# Patient Record
Sex: FEMALE | Race: WHITE | Employment: UNEMPLOYED | URBAN - METROPOLITAN AREA
[De-identification: names, ages, dates, MRNs, and addresses within clinical notes are randomized per-mention and may not be internally consistent; named-entity substitution may affect disease eponyms.]

---

## 2018-01-15 ENCOUNTER — OFFICE VISIT (OUTPATIENT)
Dept: INTERNAL MEDICINE CLINIC | Age: 33
End: 2018-01-15

## 2018-01-15 VITALS
HEART RATE: 72 BPM | BODY MASS INDEX: 48.99 KG/M2 | HEIGHT: 59 IN | SYSTOLIC BLOOD PRESSURE: 110 MMHG | WEIGHT: 243 LBS | DIASTOLIC BLOOD PRESSURE: 78 MMHG

## 2018-01-15 DIAGNOSIS — F32.A DEPRESSION, UNSPECIFIED DEPRESSION TYPE: ICD-10-CM

## 2018-01-15 DIAGNOSIS — M54.41 CHRONIC MIDLINE LOW BACK PAIN WITH BILATERAL SCIATICA: ICD-10-CM

## 2018-01-15 DIAGNOSIS — E27.8 LEFT ADRENAL MASS (HCC): ICD-10-CM

## 2018-01-15 DIAGNOSIS — M54.42 CHRONIC MIDLINE LOW BACK PAIN WITH BILATERAL SCIATICA: ICD-10-CM

## 2018-01-15 DIAGNOSIS — M54.42 CHRONIC MIDLINE LOW BACK PAIN WITH BILATERAL SCIATICA: Primary | ICD-10-CM

## 2018-01-15 DIAGNOSIS — G89.29 CHRONIC MIDLINE LOW BACK PAIN WITH BILATERAL SCIATICA: ICD-10-CM

## 2018-01-15 DIAGNOSIS — J45.40 MODERATE PERSISTENT ASTHMA WITHOUT COMPLICATION: ICD-10-CM

## 2018-01-15 DIAGNOSIS — E66.09 OBESITY DUE TO EXCESS CALORIES WITHOUT SERIOUS COMORBIDITY, UNSPECIFIED CLASSIFICATION: ICD-10-CM

## 2018-01-15 DIAGNOSIS — M54.41 CHRONIC MIDLINE LOW BACK PAIN WITH BILATERAL SCIATICA: Primary | ICD-10-CM

## 2018-01-15 DIAGNOSIS — G89.29 CHRONIC MIDLINE LOW BACK PAIN WITH BILATERAL SCIATICA: Primary | ICD-10-CM

## 2018-01-15 LAB
A/G RATIO: 1.7 (ref 1.1–2.2)
ALBUMIN SERPL-MCNC: 4.4 G/DL (ref 3.4–5)
ALP BLD-CCNC: 72 U/L (ref 40–129)
ALT SERPL-CCNC: 33 U/L (ref 10–40)
ANION GAP SERPL CALCULATED.3IONS-SCNC: 14 MMOL/L (ref 3–16)
AST SERPL-CCNC: 20 U/L (ref 15–37)
BACTERIA: ABNORMAL /HPF
BILIRUB SERPL-MCNC: 0.4 MG/DL (ref 0–1)
BILIRUBIN URINE: NEGATIVE
BLOOD, URINE: NEGATIVE
BUN BLDV-MCNC: 9 MG/DL (ref 7–20)
CALCIUM SERPL-MCNC: 9.5 MG/DL (ref 8.3–10.6)
CHLORIDE BLD-SCNC: 103 MMOL/L (ref 99–110)
CHOLESTEROL, TOTAL: 230 MG/DL (ref 0–199)
CLARITY: ABNORMAL
CO2: 22 MMOL/L (ref 21–32)
COLOR: YELLOW
CREAT SERPL-MCNC: 0.6 MG/DL (ref 0.6–1.1)
EPITHELIAL CELLS, UA: >36 /HPF (ref 0–5)
GFR AFRICAN AMERICAN: >60
GFR NON-AFRICAN AMERICAN: >60
GLOBULIN: 2.6 G/DL
GLUCOSE BLD-MCNC: 78 MG/DL (ref 70–99)
GLUCOSE URINE: NEGATIVE MG/DL
HCG QUALITATIVE: NEGATIVE
HCT VFR BLD CALC: 45.5 % (ref 36–48)
HDLC SERPL-MCNC: 46 MG/DL (ref 40–60)
HEMOGLOBIN: 15.5 G/DL (ref 12–16)
HYALINE CASTS: 4 /LPF (ref 0–8)
KETONES, URINE: NEGATIVE MG/DL
LDL CHOLESTEROL CALCULATED: 161 MG/DL
LEUKOCYTE ESTERASE, URINE: ABNORMAL
MCH RBC QN AUTO: 33.5 PG (ref 26–34)
MCHC RBC AUTO-ENTMCNC: 34.1 G/DL (ref 31–36)
MCV RBC AUTO: 98.1 FL (ref 80–100)
MICROSCOPIC EXAMINATION: YES
NITRITE, URINE: POSITIVE
PDW BLD-RTO: 13.7 % (ref 12.4–15.4)
PH UA: 6
PLATELET # BLD: 227 K/UL (ref 135–450)
PMV BLD AUTO: 10.1 FL (ref 5–10.5)
POTASSIUM SERPL-SCNC: 4.2 MMOL/L (ref 3.5–5.1)
PROTEIN UA: NEGATIVE MG/DL
RBC # BLD: 4.63 M/UL (ref 4–5.2)
RBC UA: 3 /HPF (ref 0–4)
SODIUM BLD-SCNC: 139 MMOL/L (ref 136–145)
SPECIFIC GRAVITY UA: 1.02
TOTAL PROTEIN: 7 G/DL (ref 6.4–8.2)
TRIGL SERPL-MCNC: 116 MG/DL (ref 0–150)
TSH REFLEX: 2.77 UIU/ML (ref 0.27–4.2)
URINE TYPE: ABNORMAL
UROBILINOGEN, URINE: 0.2 E.U./DL
VITAMIN D 25-HYDROXY: 17 NG/ML
VLDLC SERPL CALC-MCNC: 23 MG/DL
WBC # BLD: 9.8 K/UL (ref 4–11)
WBC UA: 7 /HPF (ref 0–5)

## 2018-01-15 PROCEDURE — G8427 DOCREV CUR MEDS BY ELIG CLIN: HCPCS | Performed by: INTERNAL MEDICINE

## 2018-01-15 PROCEDURE — 4004F PT TOBACCO SCREEN RCVD TLK: CPT | Performed by: INTERNAL MEDICINE

## 2018-01-15 PROCEDURE — 99204 OFFICE O/P NEW MOD 45 MIN: CPT | Performed by: INTERNAL MEDICINE

## 2018-01-15 PROCEDURE — G8417 CALC BMI ABV UP PARAM F/U: HCPCS | Performed by: INTERNAL MEDICINE

## 2018-01-15 PROCEDURE — G8484 FLU IMMUNIZE NO ADMIN: HCPCS | Performed by: INTERNAL MEDICINE

## 2018-01-15 PROCEDURE — 96160 PT-FOCUSED HLTH RISK ASSMT: CPT | Performed by: INTERNAL MEDICINE

## 2018-01-15 RX ORDER — FLUOXETINE HYDROCHLORIDE 20 MG/1
20 CAPSULE ORAL DAILY
Qty: 30 CAPSULE | Refills: 3 | Status: SHIPPED | OUTPATIENT
Start: 2018-01-15 | End: 2018-06-19 | Stop reason: SDUPTHER

## 2018-01-15 RX ORDER — ALBUTEROL SULFATE 90 UG/1
2 AEROSOL, METERED RESPIRATORY (INHALATION) EVERY 6 HOURS PRN
Qty: 1 INHALER | Refills: 3 | Status: SHIPPED | OUTPATIENT
Start: 2018-01-15 | End: 2018-08-03 | Stop reason: SDUPTHER

## 2018-01-15 RX ORDER — ALBUTEROL SULFATE 90 UG/1
2 AEROSOL, METERED RESPIRATORY (INHALATION) EVERY 6 HOURS PRN
Qty: 1 INHALER | Refills: 3 | Status: SHIPPED | OUTPATIENT
Start: 2018-01-15 | End: 2018-02-14 | Stop reason: CLARIF

## 2018-01-15 ASSESSMENT — ENCOUNTER SYMPTOMS
COUGH: 1
BACK PAIN: 1
EYE PAIN: 0
EYE DISCHARGE: 0
ABDOMINAL PAIN: 0
VOICE CHANGE: 0
NAUSEA: 0
RHINORRHEA: 0
VOMITING: 0
WHEEZING: 1
SHORTNESS OF BREATH: 0
COLOR CHANGE: 0

## 2018-01-15 ASSESSMENT — PATIENT HEALTH QUESTIONNAIRE - PHQ9
5. POOR APPETITE OR OVEREATING: 2
1. LITTLE INTEREST OR PLEASURE IN DOING THINGS: 3
10. IF YOU CHECKED OFF ANY PROBLEMS, HOW DIFFICULT HAVE THESE PROBLEMS MADE IT FOR YOU TO DO YOUR WORK, TAKE CARE OF THINGS AT HOME, OR GET ALONG WITH OTHER PEOPLE: 2
6. FEELING BAD ABOUT YOURSELF - OR THAT YOU ARE A FAILURE OR HAVE LET YOURSELF OR YOUR FAMILY DOWN: 3
7. TROUBLE CONCENTRATING ON THINGS, SUCH AS READING THE NEWSPAPER OR WATCHING TELEVISION: 3
2. FEELING DOWN, DEPRESSED OR HOPELESS: 3
8. MOVING OR SPEAKING SO SLOWLY THAT OTHER PEOPLE COULD HAVE NOTICED. OR THE OPPOSITE, BEING SO FIGETY OR RESTLESS THAT YOU HAVE BEEN MOVING AROUND A LOT MORE THAN USUAL: 3
SUM OF ALL RESPONSES TO PHQ QUESTIONS 1-9: 24
4. FEELING TIRED OR HAVING LITTLE ENERGY: 3
3. TROUBLE FALLING OR STAYING ASLEEP: 3
SUM OF ALL RESPONSES TO PHQ9 QUESTIONS 1 & 2: 6
9. THOUGHTS THAT YOU WOULD BE BETTER OFF DEAD, OR OF HURTING YOURSELF: 1

## 2018-01-15 NOTE — PROGRESS NOTES
(VENTOLIN HFA) 108 (90 Base) MCG/ACT inhaler; Inhale 2 puffs into the lungs every 6 hours as needed for Wheezing  Patient has been trying to quit his smoking. She will let me know once she is ready to quit. Depression, unspecified depression type  -     CBC; Future  -     COMPREHENSIVE METABOLIC PANEL; Future  -     TSH with Reflex; Future  -     LIPID PANEL; Future  -     FLUoxetine (PROZAC) 20 MG capsule; Take 1 capsule by mouth daily (she had been on this med in the past)  -     Ambulatory referral to Psychology  -     HCG, SERUM, QUALITATIVE; Future  If you feel that you want to hurt yourself, immediate help is available. Available resources include 1, the mobile crisis unit 212-1420, and the suicide prevention hotline at 0-834.368.2377. Left adrenal mass (HCC)  -     MRI Abdomen W WO Contrast; Future  -     HCG, SERUM, QUALITATIVE; Future    Obesity due to excess calories without serious comorbidity, unspecified classification  -     Internal Referral to Dietitian  The patient is asked to make an attempt to improve diet and exercise patterns to aid in medical management of this problem. Orders Placed This Encounter   Procedures    MRI Abdomen W WO Contrast     Standing Status:   Future     Standing Expiration Date:   1/15/2019     Order Specific Question:   Reason for exam:     Answer:   adrenal mass.  follow up    XR LUMBAR SPINE (MIN 4 VIEWS)     Standing Status:   Future     Standing Expiration Date:   1/15/2019     Order Specific Question:   Reason for exam:     Answer:   chronic back pain    CBC     Standing Status:   Future     Number of Occurrences:   1     Standing Expiration Date:   1/15/2019    COMPREHENSIVE METABOLIC PANEL     Standing Status:   Future     Number of Occurrences:   1     Standing Expiration Date:   1/15/2019    TSH with Reflex     Standing Status:   Future     Number of Occurrences:   1     Standing Expiration Date:   1/15/2019    LIPID PANEL     Standing Status:   Future     Number of Occurrences:   1     Standing Expiration Date:   1/15/2019     Order Specific Question:   Is Patient Fasting?/# of Hours     Answer:   non fasting    Urinalysis     Standing Status:   Future     Number of Occurrences:   1     Standing Expiration Date:   1/15/2019    VITAMIN D 25 HYDROXY     Standing Status:   Future     Number of Occurrences:   1     Standing Expiration Date:   1/15/2019    HCG, SERUM, QUALITATIVE     Standing Status:   Future     Number of Occurrences:   1     Standing Expiration Date:   1/15/2019    Internal Referral to Dietitian     Referral Priority:   Routine     Referral Type:   Consult for Advice and Opinion     Referral Reason:   Specialty Services Required     Requested Specialty:   Nutrition     Number of Visits Requested:   1    Ambulatory referral to Psychology     Referral Priority:   Routine     Referral Type:   Consult for Advice and Opinion     Referral Reason:   Specialty Services Required     Referred to Provider:   Yani Roy, PhD     Requested Specialty:   Psychology     Number of Visits Requested:   1     Current Outpatient Prescriptions   Medication Sig Dispense Refill    albuterol sulfate HFA (PROAIR HFA) 108 (90 Base) MCG/ACT inhaler Inhale 2 puffs into the lungs every 6 hours as needed for Wheezing 1 Inhaler 3    diclofenac (VOLTAREN) 50 MG EC tablet Take 1 tablet by mouth 3 times daily (with meals) 60 tablet 3    albuterol sulfate HFA (VENTOLIN HFA) 108 (90 Base) MCG/ACT inhaler Inhale 2 puffs into the lungs every 6 hours as needed for Wheezing 1 Inhaler 3    FLUoxetine (PROZAC) 20 MG capsule Take 1 capsule by mouth daily 30 capsule 3    Levonorgestrel (MIRENA IU) by Intrauterine route.  albuterol (PROVENTIL HFA) 108 (90 BASE) MCG/ACT inhaler Inhale 2 puffs into the lungs every 6 hours as needed for Wheezing. 1 Inhaler 1     No current facility-administered medications for this visit.         Return in about 4 weeks (around

## 2018-01-16 DIAGNOSIS — N39.0 URINARY TRACT INFECTION WITHOUT HEMATURIA, SITE UNSPECIFIED: ICD-10-CM

## 2018-01-16 DIAGNOSIS — E78.01 FAMILIAL HYPERCHOLESTEROLEMIA: Primary | ICD-10-CM

## 2018-01-16 DIAGNOSIS — E55.9 VITAMIN D DEFICIENCY: Primary | ICD-10-CM

## 2018-01-16 RX ORDER — ATORVASTATIN CALCIUM 20 MG/1
20 TABLET, FILM COATED ORAL DAILY
Qty: 30 TABLET | Refills: 3 | Status: SHIPPED | OUTPATIENT
Start: 2018-01-16 | End: 2018-08-03 | Stop reason: SDUPTHER

## 2018-01-16 RX ORDER — CEPHALEXIN 500 MG/1
500 CAPSULE ORAL 3 TIMES DAILY
Qty: 21 CAPSULE | Refills: 0 | Status: SHIPPED | OUTPATIENT
Start: 2018-01-16 | End: 2018-01-23

## 2018-02-14 ENCOUNTER — OFFICE VISIT (OUTPATIENT)
Dept: PSYCHOLOGY | Age: 33
End: 2018-02-14

## 2018-02-14 ENCOUNTER — OFFICE VISIT (OUTPATIENT)
Dept: INTERNAL MEDICINE CLINIC | Age: 33
End: 2018-02-14

## 2018-02-14 DIAGNOSIS — F32.A DEPRESSION, UNSPECIFIED DEPRESSION TYPE: ICD-10-CM

## 2018-02-14 DIAGNOSIS — E55.9 VITAMIN D DEFICIENCY: ICD-10-CM

## 2018-02-14 DIAGNOSIS — M54.42 CHRONIC MIDLINE LOW BACK PAIN WITH BILATERAL SCIATICA: ICD-10-CM

## 2018-02-14 DIAGNOSIS — F51.01 PRIMARY INSOMNIA: ICD-10-CM

## 2018-02-14 DIAGNOSIS — J45.40 MODERATE PERSISTENT ASTHMA WITHOUT COMPLICATION: Primary | ICD-10-CM

## 2018-02-14 DIAGNOSIS — E78.01 FAMILIAL HYPERCHOLESTEROLEMIA: ICD-10-CM

## 2018-02-14 DIAGNOSIS — G89.29 CHRONIC MIDLINE LOW BACK PAIN WITH BILATERAL SCIATICA: ICD-10-CM

## 2018-02-14 DIAGNOSIS — F33.1 MODERATE EPISODE OF RECURRENT MAJOR DEPRESSIVE DISORDER (HCC): Primary | ICD-10-CM

## 2018-02-14 DIAGNOSIS — M54.41 CHRONIC MIDLINE LOW BACK PAIN WITH BILATERAL SCIATICA: ICD-10-CM

## 2018-02-14 PROCEDURE — 4004F PT TOBACCO SCREEN RCVD TLK: CPT | Performed by: INTERNAL MEDICINE

## 2018-02-14 PROCEDURE — G8417 CALC BMI ABV UP PARAM F/U: HCPCS | Performed by: INTERNAL MEDICINE

## 2018-02-14 PROCEDURE — 90791 PSYCH DIAGNOSTIC EVALUATION: CPT | Performed by: PSYCHOLOGIST

## 2018-02-14 PROCEDURE — G8427 DOCREV CUR MEDS BY ELIG CLIN: HCPCS | Performed by: INTERNAL MEDICINE

## 2018-02-14 PROCEDURE — G8484 FLU IMMUNIZE NO ADMIN: HCPCS | Performed by: INTERNAL MEDICINE

## 2018-02-14 PROCEDURE — 99214 OFFICE O/P EST MOD 30 MIN: CPT | Performed by: INTERNAL MEDICINE

## 2018-02-14 RX ORDER — DICLOFENAC SODIUM 75 MG/1
75 TABLET, DELAYED RELEASE ORAL 2 TIMES DAILY
Qty: 60 TABLET | Refills: 1 | Status: SHIPPED | OUTPATIENT
Start: 2018-02-14 | End: 2018-06-19 | Stop reason: SDUPTHER

## 2018-02-14 RX ORDER — LANOLIN ALCOHOL/MO/W.PET/CERES
3 CREAM (GRAM) TOPICAL DAILY
Qty: 30 TABLET | Refills: 2 | Status: SHIPPED | OUTPATIENT
Start: 2018-02-14 | End: 2018-11-05 | Stop reason: SDUPTHER

## 2018-02-14 ASSESSMENT — ENCOUNTER SYMPTOMS
COUGH: 0
ABDOMINAL PAIN: 0
WHEEZING: 0
PHOTOPHOBIA: 0
NAUSEA: 0
SHORTNESS OF BREATH: 0
VOMITING: 0

## 2018-02-14 NOTE — PROGRESS NOTES
LIPID PANEL; Future  -     AST; Future  May consider increasing Lipitor pending lab work. The patient is asked to make an attempt to improve diet and exercise patterns to aid in medical management of this problem. Vitamin D deficiency  After completion of initial dose 50,000 weekly , She will need 2000 unit every day  Chronic midline low back pain with bilateral sciatica  -     XR LUMBAR SPINE (MIN 4 VIEWS)  -     diclofenac (VOLTAREN) 75 MG EC tablet; Take 1 tablet by mouth 2 times daily    Primary insomnia  -     melatonin (RA MELATONIN) 3 MG TABS tablet; Take 1 tablet by mouth daily  May consider sleep study to exclude sleep apnea in future visit.   Patient is advised to record sign symptoms suggestive of sleep apnea  Follow-up visit in 3 months

## 2018-02-15 ENCOUNTER — HOSPITAL ENCOUNTER (OUTPATIENT)
Dept: MRI IMAGING | Age: 33
Discharge: HOME | End: 2018-02-15
Attending: INTERNAL MEDICINE | Admitting: INTERNAL MEDICINE

## 2018-02-15 DIAGNOSIS — E27.8 LEFT ADRENAL MASS (HCC): ICD-10-CM

## 2018-02-15 DIAGNOSIS — E27.9 DISORDER OF ADRENAL GLAND (HCC): ICD-10-CM

## 2018-02-15 RX ORDER — 0.9 % SODIUM CHLORIDE 0.9 %
10 VIAL (ML) INJECTION
Status: COMPLETED | OUTPATIENT
Start: 2018-02-15 | End: 2018-02-15

## 2018-02-15 RX ADMIN — Medication 100 ML: at 12:54

## 2018-02-21 ENCOUNTER — TELEPHONE (OUTPATIENT)
Dept: INTERNAL MEDICINE CLINIC | Age: 33
End: 2018-02-21

## 2018-02-21 PROBLEM — F33.1 MODERATE EPISODE OF RECURRENT MAJOR DEPRESSIVE DISORDER (HCC): Status: ACTIVE | Noted: 2018-02-21

## 2018-05-01 ENCOUNTER — TELEPHONE (OUTPATIENT)
Dept: INTERNAL MEDICINE CLINIC | Age: 33
End: 2018-05-01

## 2018-05-01 DIAGNOSIS — N39.0 URINARY TRACT INFECTION WITHOUT HEMATURIA, SITE UNSPECIFIED: Primary | ICD-10-CM

## 2018-05-01 RX ORDER — CEPHALEXIN 500 MG/1
500 CAPSULE ORAL 3 TIMES DAILY
Qty: 21 CAPSULE | Refills: 0 | Status: SHIPPED | OUTPATIENT
Start: 2018-05-01 | End: 2018-08-03 | Stop reason: ALTCHOICE

## 2018-05-02 ENCOUNTER — OFFICE VISIT (OUTPATIENT)
Dept: INTERNAL MEDICINE CLINIC | Age: 33
End: 2018-05-02

## 2018-05-02 VITALS
SYSTOLIC BLOOD PRESSURE: 112 MMHG | HEIGHT: 59 IN | DIASTOLIC BLOOD PRESSURE: 78 MMHG | WEIGHT: 250 LBS | BODY MASS INDEX: 50.4 KG/M2 | HEART RATE: 72 BPM

## 2018-05-02 DIAGNOSIS — N39.0 URINARY TRACT INFECTION WITHOUT HEMATURIA, SITE UNSPECIFIED: ICD-10-CM

## 2018-05-02 DIAGNOSIS — R30.0 DYSURIA: Primary | ICD-10-CM

## 2018-05-02 LAB
BILIRUBIN, POC: ABNORMAL
BLOOD URINE, POC: ABNORMAL
CLARITY, POC: CLEAR
COLOR, POC: ABNORMAL
GLUCOSE URINE, POC: ABNORMAL
KETONES, POC: ABNORMAL
LEUKOCYTE EST, POC: ABNORMAL
NITRITE, POC: ABNORMAL
PH, POC: 7
PROTEIN, POC: ABNORMAL
SPECIFIC GRAVITY, POC: 1.02
UROBILINOGEN, POC: 0.2

## 2018-05-02 PROCEDURE — 81002 URINALYSIS NONAUTO W/O SCOPE: CPT | Performed by: INTERNAL MEDICINE

## 2018-05-02 PROCEDURE — 99213 OFFICE O/P EST LOW 20 MIN: CPT | Performed by: INTERNAL MEDICINE

## 2018-05-02 PROCEDURE — G8417 CALC BMI ABV UP PARAM F/U: HCPCS | Performed by: INTERNAL MEDICINE

## 2018-05-02 PROCEDURE — G8427 DOCREV CUR MEDS BY ELIG CLIN: HCPCS | Performed by: INTERNAL MEDICINE

## 2018-05-02 PROCEDURE — 4004F PT TOBACCO SCREEN RCVD TLK: CPT | Performed by: INTERNAL MEDICINE

## 2018-05-02 RX ORDER — CHOLECALCIFEROL (VITAMIN D3) 1250 MCG
CAPSULE ORAL WEEKLY
COMMUNITY
End: 2018-08-03 | Stop reason: SDUPTHER

## 2018-05-02 ASSESSMENT — ENCOUNTER SYMPTOMS
ABDOMINAL PAIN: 0
NAUSEA: 0
VOMITING: 0

## 2018-08-03 ENCOUNTER — OFFICE VISIT (OUTPATIENT)
Dept: INTERNAL MEDICINE CLINIC | Age: 33
End: 2018-08-03

## 2018-08-03 VITALS
WEIGHT: 242 LBS | HEART RATE: 72 BPM | DIASTOLIC BLOOD PRESSURE: 88 MMHG | SYSTOLIC BLOOD PRESSURE: 126 MMHG | HEIGHT: 59 IN | BODY MASS INDEX: 48.79 KG/M2

## 2018-08-03 DIAGNOSIS — E78.01 FAMILIAL HYPERCHOLESTEROLEMIA: ICD-10-CM

## 2018-08-03 DIAGNOSIS — J45.40 MODERATE PERSISTENT ASTHMA WITHOUT COMPLICATION: ICD-10-CM

## 2018-08-03 DIAGNOSIS — E55.9 VITAMIN D DEFICIENCY: ICD-10-CM

## 2018-08-03 DIAGNOSIS — F32.A DEPRESSION, UNSPECIFIED DEPRESSION TYPE: Primary | ICD-10-CM

## 2018-08-03 DIAGNOSIS — F17.219 CIGARETTE NICOTINE DEPENDENCE WITH NICOTINE-INDUCED DISORDER: ICD-10-CM

## 2018-08-03 LAB
ALBUMIN SERPL-MCNC: 4.3 G/DL (ref 3.4–5)
ALP BLD-CCNC: 76 U/L (ref 40–129)
ALT SERPL-CCNC: 29 U/L (ref 10–40)
AST SERPL-CCNC: 21 U/L (ref 15–37)
BILIRUB SERPL-MCNC: <0.2 MG/DL (ref 0–1)
BILIRUBIN DIRECT: <0.2 MG/DL (ref 0–0.3)
BILIRUBIN, INDIRECT: NORMAL MG/DL (ref 0–1)
CHOLESTEROL, TOTAL: 234 MG/DL (ref 0–199)
HDLC SERPL-MCNC: 36 MG/DL (ref 40–60)
LDL CHOLESTEROL CALCULATED: 157 MG/DL
TOTAL PROTEIN: 7.2 G/DL (ref 6.4–8.2)
TRIGL SERPL-MCNC: 206 MG/DL (ref 0–150)
VITAMIN D 25-HYDROXY: 30 NG/ML
VLDLC SERPL CALC-MCNC: 41 MG/DL

## 2018-08-03 PROCEDURE — 99214 OFFICE O/P EST MOD 30 MIN: CPT | Performed by: INTERNAL MEDICINE

## 2018-08-03 PROCEDURE — 4004F PT TOBACCO SCREEN RCVD TLK: CPT | Performed by: INTERNAL MEDICINE

## 2018-08-03 PROCEDURE — G8417 CALC BMI ABV UP PARAM F/U: HCPCS | Performed by: INTERNAL MEDICINE

## 2018-08-03 PROCEDURE — G8427 DOCREV CUR MEDS BY ELIG CLIN: HCPCS | Performed by: INTERNAL MEDICINE

## 2018-08-03 RX ORDER — CHOLECALCIFEROL (VITAMIN D3) 1250 MCG
50000 CAPSULE ORAL WEEKLY
Qty: 12 CAPSULE | Refills: 1 | Status: SHIPPED | OUTPATIENT
Start: 2018-08-03 | End: 2018-11-05 | Stop reason: SDUPTHER

## 2018-08-03 RX ORDER — FLUOXETINE HYDROCHLORIDE 20 MG/1
20 CAPSULE ORAL DAILY
Qty: 90 CAPSULE | Refills: 1 | Status: SHIPPED | OUTPATIENT
Start: 2018-08-03 | End: 2018-11-05 | Stop reason: SDUPTHER

## 2018-08-03 RX ORDER — ALBUTEROL SULFATE 90 UG/1
2 AEROSOL, METERED RESPIRATORY (INHALATION) EVERY 6 HOURS PRN
Qty: 3 INHALER | Refills: 1 | Status: SHIPPED | OUTPATIENT
Start: 2018-08-03 | End: 2020-01-20 | Stop reason: SDUPTHER

## 2018-08-03 RX ORDER — ATORVASTATIN CALCIUM 20 MG/1
20 TABLET, FILM COATED ORAL DAILY
Qty: 90 TABLET | Refills: 1 | Status: SHIPPED | OUTPATIENT
Start: 2018-08-03 | End: 2018-11-05 | Stop reason: SDUPTHER

## 2018-08-03 RX ORDER — NYSTATIN 100000 [USP'U]/G
POWDER TOPICAL 4 TIMES DAILY
COMMUNITY
End: 2018-11-05 | Stop reason: SDUPTHER

## 2018-08-03 ASSESSMENT — ENCOUNTER SYMPTOMS
WHEEZING: 1
COUGH: 1
NAUSEA: 0
ABDOMINAL PAIN: 0

## 2018-08-03 NOTE — PROGRESS NOTES
01/15/2018    ANIONGAP 14 01/15/2018    GLUCOSE 78 01/15/2018    BUN 9 01/15/2018    CREATININE 0.6 01/15/2018    GFRAA >60 01/15/2018    CALCIUM 9.5 01/15/2018    PROT 7.0 01/15/2018    LABALBU 4.4 01/15/2018    AGRATIO 1.7 01/15/2018    BILITOT 0.4 01/15/2018    ALKPHOS 72 01/15/2018    ALT 33 01/15/2018    AST 20 01/15/2018    GLOB 2.6 01/15/2018   MICRO/ALB:   Lab Results   Component Value Date    LABMICR YES 01/15/2018     LIPID:   Lab Results   Component Value Date    CHOL 230 01/15/2018    TRIG 116 01/15/2018    HDL 46 01/15/2018    LDLCALC 161 01/15/2018    LABVLDL 23 01/15/2018     TSH:   Lab Results   Component Value Date    TSHREFLEX 2.77 01/15/2018         ASSESSMENT/PLAN:    Ya Prince was seen today for follow-up and nicotine dependence. Diagnoses and all orders for this visit:    Depression, unspecified depression type  continue-     FLUoxetine (PROZAC) 20 MG capsule; Take 1 capsule by mouth daily    Familial hypercholesterolemia  -     atorvastatin (LIPITOR) 20 MG tablet; Take 1 tablet by mouth daily  -     LIPID PANEL; Future  -     HEPATIC FUNCTION PANEL; Future    Moderate persistent asthma without complication  -     albuterol sulfate HFA (VENTOLIN HFA) 108 (90 Base) MCG/ACT inhaler; Inhale 2 puffs into the lungs every 6 hours as needed for Wheezing  add-     beclomethasone (QVAR) 80 MCG/ACT inhaler; Inhale 1 puff into the lungs 2 times daily    Cyst of neck  -     Ramone Apple MD    Vitamin D deficiency  -     VITAMIN D 25 HYDROXY; Future    Cigarette nicotine dependence with nicotine-induced disorder  -     nicotine (NICOTROL) 10 MG inhaler; Inhale 1 puff into the lungs as needed for Smoking cessation    Other orders  -     Cholecalciferol (VITAMIN D3) 04205 units CAPS;  Take 50,000 capsules by mouth once a week            Orders Placed This Encounter   Procedures    LIPID PANEL     Standing Status:   Future     Number of Occurrences:   1     Standing Expiration Date:   8/3/2019

## 2018-08-16 ENCOUNTER — TELEPHONE (OUTPATIENT)
Dept: INTERNAL MEDICINE CLINIC | Age: 33
End: 2018-08-16

## 2018-08-16 DIAGNOSIS — M54.42 CHRONIC MIDLINE LOW BACK PAIN WITH BILATERAL SCIATICA: ICD-10-CM

## 2018-08-16 DIAGNOSIS — G89.29 CHRONIC MIDLINE LOW BACK PAIN WITH BILATERAL SCIATICA: ICD-10-CM

## 2018-08-16 DIAGNOSIS — M54.41 CHRONIC MIDLINE LOW BACK PAIN WITH BILATERAL SCIATICA: ICD-10-CM

## 2018-08-16 RX ORDER — DICLOFENAC SODIUM 75 MG/1
TABLET, DELAYED RELEASE ORAL
Qty: 60 TABLET | Refills: 3 | Status: SHIPPED | OUTPATIENT
Start: 2018-08-16 | End: 2018-11-05 | Stop reason: SDUPTHER

## 2018-08-20 ENCOUNTER — PREP FOR PROCEDURE (OUTPATIENT)
Dept: SURGERY | Age: 33
End: 2018-08-20

## 2018-08-20 ENCOUNTER — OFFICE VISIT (OUTPATIENT)
Dept: SURGERY | Age: 33
End: 2018-08-20

## 2018-08-20 VITALS
SYSTOLIC BLOOD PRESSURE: 100 MMHG | WEIGHT: 244 LBS | DIASTOLIC BLOOD PRESSURE: 70 MMHG | BODY MASS INDEX: 49.19 KG/M2 | HEIGHT: 59 IN

## 2018-08-20 DIAGNOSIS — L72.3 SEBACEOUS CYST: Primary | ICD-10-CM

## 2018-08-20 PROCEDURE — G8427 DOCREV CUR MEDS BY ELIG CLIN: HCPCS | Performed by: SURGERY

## 2018-08-20 PROCEDURE — 4004F PT TOBACCO SCREEN RCVD TLK: CPT | Performed by: SURGERY

## 2018-08-20 PROCEDURE — 99203 OFFICE O/P NEW LOW 30 MIN: CPT | Performed by: SURGERY

## 2018-08-20 PROCEDURE — G8417 CALC BMI ABV UP PARAM F/U: HCPCS | Performed by: SURGERY

## 2018-08-20 RX ORDER — SODIUM CHLORIDE 0.9 % (FLUSH) 0.9 %
10 SYRINGE (ML) INJECTION PRN
Status: CANCELLED | OUTPATIENT
Start: 2018-08-20 | End: 2019-08-20

## 2018-08-20 RX ORDER — SODIUM CHLORIDE 0.9 % (FLUSH) 0.9 %
10 SYRINGE (ML) INJECTION EVERY 12 HOURS SCHEDULED
Status: CANCELLED | OUTPATIENT
Start: 2018-08-20 | End: 2019-08-20

## 2018-08-20 ASSESSMENT — ENCOUNTER SYMPTOMS
EYE DISCHARGE: 0
EYE ITCHING: 0
ABDOMINAL DISTENTION: 0
ABDOMINAL PAIN: 0
BACK PAIN: 1
COLOR CHANGE: 0
CHEST TIGHTNESS: 0
APNEA: 0

## 2018-08-23 ENCOUNTER — HOSPITAL ENCOUNTER (OUTPATIENT)
Dept: SURGERY | Age: 33
Discharge: HOME | End: 2018-08-23

## 2018-08-23 VITALS
HEIGHT: 59 IN | SYSTOLIC BLOOD PRESSURE: 105 MMHG | RESPIRATION RATE: 14 BRPM | WEIGHT: 241.1 LBS | TEMPERATURE: 97.7 F | HEART RATE: 75 BPM | OXYGEN SATURATION: 96 % | BODY MASS INDEX: 48.6 KG/M2 | DIASTOLIC BLOOD PRESSURE: 62 MMHG

## 2018-08-23 DIAGNOSIS — L72.3 SEBACEOUS CYST: Primary | ICD-10-CM

## 2018-08-23 LAB
ANION GAP SERPL CALCULATED.3IONS-SCNC: 10 MMOL/L (ref 3–16)
BUN BLDV-MCNC: 10 MG/DL (ref 7–20)
CALCIUM SERPL-MCNC: 9.4 MG/DL (ref 8.3–10.6)
CHLORIDE BLD-SCNC: 105 MMOL/L (ref 99–110)
CO2: 22 MMOL/L (ref 21–32)
CREAT SERPL-MCNC: 0.7 MG/DL (ref 0.6–1.1)
GFR AFRICAN AMERICAN: >60
GFR NON-AFRICAN AMERICAN: >60
GLUCOSE BLD-MCNC: 105 MG/DL (ref 70–99)
HCG(URINE) PREGNANCY TEST: NEGATIVE
HCT VFR BLD CALC: 46.5 % (ref 36–48)
HEMOGLOBIN: 15.7 G/DL (ref 12–16)
MCH RBC QN AUTO: 33.6 PG (ref 26–34)
MCHC RBC AUTO-ENTMCNC: 33.8 G/DL (ref 31–36)
MCV RBC AUTO: 99.4 FL (ref 80–100)
PDW BLD-RTO: 13.5 % (ref 12.4–15.4)
PLATELET # BLD: 227 K/UL (ref 135–450)
PMV BLD AUTO: 8.8 FL (ref 5–10.5)
POTASSIUM SERPL-SCNC: 4 MMOL/L (ref 3.5–5.1)
RBC # BLD: 4.68 M/UL (ref 4–5.2)
SODIUM BLD-SCNC: 137 MMOL/L (ref 136–145)
WBC # BLD: 10.2 K/UL (ref 4–11)

## 2018-08-23 PROCEDURE — 11423 EXC H-F-NK-SP B9+MARG 2.1-3: CPT | Performed by: SURGERY

## 2018-08-23 PROCEDURE — 12032 INTMD RPR S/A/T/EXT 2.6-7.5: CPT | Performed by: SURGERY

## 2018-08-23 RX ORDER — OXYCODONE HYDROCHLORIDE 5 MG/1
5 TABLET ORAL ONCE
Status: COMPLETED | OUTPATIENT
Start: 2018-08-23 | End: 2018-08-23

## 2018-08-23 RX ORDER — MEPERIDINE HYDROCHLORIDE 25 MG/ML
12.5 INJECTION INTRAMUSCULAR; INTRAVENOUS; SUBCUTANEOUS EVERY 5 MIN PRN
Status: DISCONTINUED | OUTPATIENT
Start: 2018-08-23 | End: 2018-08-24 | Stop reason: HOSPADM

## 2018-08-23 RX ORDER — FENTANYL CITRATE 50 UG/ML
50 INJECTION, SOLUTION INTRAMUSCULAR; INTRAVENOUS EVERY 5 MIN PRN
Status: DISCONTINUED | OUTPATIENT
Start: 2018-08-23 | End: 2018-08-24 | Stop reason: HOSPADM

## 2018-08-23 RX ORDER — OXYCODONE HYDROCHLORIDE 5 MG/1
5 TABLET ORAL EVERY 4 HOURS PRN
Qty: 30 TABLET | Refills: 0 | Status: SHIPPED | OUTPATIENT
Start: 2018-08-23 | End: 2018-08-30

## 2018-08-23 RX ORDER — FENTANYL CITRATE 50 UG/ML
25 INJECTION, SOLUTION INTRAMUSCULAR; INTRAVENOUS EVERY 5 MIN PRN
Status: DISCONTINUED | OUTPATIENT
Start: 2018-08-23 | End: 2018-08-24 | Stop reason: HOSPADM

## 2018-08-23 RX ORDER — DIPHENHYDRAMINE HYDROCHLORIDE 50 MG/ML
12.5 INJECTION INTRAMUSCULAR; INTRAVENOUS
Status: ACTIVE | OUTPATIENT
Start: 2018-08-23 | End: 2018-08-23

## 2018-08-23 RX ORDER — SODIUM CHLORIDE 0.9 % (FLUSH) 0.9 %
10 SYRINGE (ML) INJECTION PRN
Status: DISCONTINUED | OUTPATIENT
Start: 2018-08-23 | End: 2018-08-24 | Stop reason: HOSPADM

## 2018-08-23 RX ORDER — CEFAZOLIN SODIUM 2 G/100ML
INJECTION, SOLUTION INTRAVENOUS
Status: COMPLETED
Start: 2018-08-23 | End: 2018-08-23

## 2018-08-23 RX ORDER — HYDROMORPHONE HCL 110MG/55ML
0.25 PATIENT CONTROLLED ANALGESIA SYRINGE INTRAVENOUS EVERY 5 MIN PRN
Status: DISCONTINUED | OUTPATIENT
Start: 2018-08-23 | End: 2018-08-24 | Stop reason: HOSPADM

## 2018-08-23 RX ORDER — PROMETHAZINE HYDROCHLORIDE 25 MG/ML
6.25 INJECTION, SOLUTION INTRAMUSCULAR; INTRAVENOUS EVERY 30 MIN PRN
Status: DISCONTINUED | OUTPATIENT
Start: 2018-08-23 | End: 2018-08-24 | Stop reason: HOSPADM

## 2018-08-23 RX ORDER — HYDROCODONE BITARTRATE AND ACETAMINOPHEN 5; 325 MG/1; MG/1
1 TABLET ORAL PRN
Status: ACTIVE | OUTPATIENT
Start: 2018-08-23 | End: 2018-08-23

## 2018-08-23 RX ORDER — HYDROCODONE BITARTRATE AND ACETAMINOPHEN 5; 325 MG/1; MG/1
2 TABLET ORAL PRN
Status: ACTIVE | OUTPATIENT
Start: 2018-08-23 | End: 2018-08-23

## 2018-08-23 RX ORDER — SODIUM CHLORIDE 9 MG/ML
INJECTION, SOLUTION INTRAVENOUS
Status: COMPLETED
Start: 2018-08-23 | End: 2018-08-23

## 2018-08-23 RX ORDER — SODIUM CHLORIDE 0.9 % (FLUSH) 0.9 %
10 SYRINGE (ML) INJECTION EVERY 12 HOURS SCHEDULED
Status: DISCONTINUED | OUTPATIENT
Start: 2018-08-23 | End: 2018-08-24 | Stop reason: HOSPADM

## 2018-08-23 RX ORDER — CEFAZOLIN SODIUM 2 G/100ML
2 INJECTION, SOLUTION INTRAVENOUS
Status: COMPLETED | OUTPATIENT
Start: 2018-08-23 | End: 2018-08-23

## 2018-08-23 RX ORDER — SODIUM CHLORIDE 9 MG/ML
INJECTION, SOLUTION INTRAVENOUS CONTINUOUS
Status: DISCONTINUED | OUTPATIENT
Start: 2018-08-23 | End: 2018-08-24 | Stop reason: HOSPADM

## 2018-08-23 RX ORDER — LIDOCAINE HYDROCHLORIDE 10 MG/ML
1 INJECTION, SOLUTION EPIDURAL; INFILTRATION; INTRACAUDAL; PERINEURAL
Status: ACTIVE | OUTPATIENT
Start: 2018-08-23 | End: 2018-08-23

## 2018-08-23 RX ORDER — HYDROMORPHONE HCL 110MG/55ML
0.5 PATIENT CONTROLLED ANALGESIA SYRINGE INTRAVENOUS EVERY 5 MIN PRN
Status: DISCONTINUED | OUTPATIENT
Start: 2018-08-23 | End: 2018-08-24 | Stop reason: HOSPADM

## 2018-08-23 RX ADMIN — FENTANYL CITRATE 50 MCG: 50 INJECTION, SOLUTION INTRAMUSCULAR; INTRAVENOUS at 11:50

## 2018-08-23 RX ADMIN — CEFAZOLIN SODIUM 2 G: 2 INJECTION, SOLUTION INTRAVENOUS at 10:51

## 2018-08-23 RX ADMIN — OXYCODONE HYDROCHLORIDE 5 MG: 5 TABLET ORAL at 13:05

## 2018-08-23 RX ADMIN — SODIUM CHLORIDE: 9 INJECTION, SOLUTION INTRAVENOUS at 09:36

## 2018-08-23 ASSESSMENT — PAIN DESCRIPTION - PAIN TYPE
TYPE: SURGICAL PAIN
TYPE: SURGICAL PAIN

## 2018-08-23 ASSESSMENT — PAIN DESCRIPTION - LOCATION
LOCATION: INCISION
LOCATION: INCISION
LOCATION: ABDOMEN

## 2018-08-23 ASSESSMENT — PAIN - FUNCTIONAL ASSESSMENT: PAIN_FUNCTIONAL_ASSESSMENT: 0-10

## 2018-08-23 ASSESSMENT — PAIN SCALES - GENERAL
PAINLEVEL_OUTOF10: 6
PAINLEVEL_OUTOF10: 7
PAINLEVEL_OUTOF10: 8

## 2018-08-23 ASSESSMENT — COPD QUESTIONNAIRES: CAT_SEVERITY: MODERATE

## 2018-08-23 NOTE — BRIEF OP NOTE
[facility]        Brief Operative Note  Pt Name: Lucy Marcos  MRN: [id number]  YOB: 1985    Date of Procedure: 8/23/2018    Pre-operative Diagnosis: Upper neck sebaceous cyst    Post-operative Diagnosis: same     Procedure: Excision of upper neck sebaceous cyst (2.5cm) and intermediate wound closure (4cm)    Surgeon: Vy He MD     Anesthesia:  MAC (sedation) with local anesthetic    Findings: Full note dictated, Dictation Job Number: [id number]    Estimated Blood Loss: less than 50  ml    Complications: none     Specimens: Upper neck soft tissue mass    Drains: none     Condition: stable     Disposition and Post-operative plan: PACU, home     Alan Frazier MD, FACS  8/23/2018  2:04 PM

## 2018-08-23 NOTE — H&P
[facility]    I have reviewed the history and physical and examined the patient and find no relevant changes. I have reviewed with the patient and/or family the risks, benefits, and alternatives to the procedure. Artur Gruber 6  Wiley Dyer MD, FACS  8/23/2018  10:32 AM

## 2018-08-23 NOTE — ANESTHESIA POST-OP
Anesthesia Post-op Note    Patient: Bari Duckworth  MRN: [id number]  YOB: 1985  Date of evaluation: 8/23/2018  Time:  11:56 AM     Procedure(s) Performed:     Last Vitals: /74   Pulse 85   Temp 97.5 °F (36.4 °C) (Temporal)   Resp 20   Ht 4' 11\" (1.499 m)   Wt 241 lb 1.6 oz (109.4 kg)   LMP  (LMP Unknown)   SpO2 98%   BMI 48.70 kg/m²     Wendi Phase I: Wendi Score: 10    Wendi Phase II:      Anesthesia Post Evaluation    Final anesthesia type: general  Patient location during evaluation: PACU  Patient participation: complete - patient participated  Level of consciousness: awake  Pain score: 2  Airway patency: patent  Nausea & Vomiting: no nausea  Complications: no  Cardiovascular status: blood pressure returned to baseline  Respiratory status: acceptable  Hydration status: euvolemic        Genia Morgan MD  11:56 AM

## 2018-08-23 NOTE — ANESTHESIA PRE-OP
Plan      MAC and TIVA     ASA 3       Induction: intravenous. MIPS: Postoperative opioids intended, Prophylactic antiemetics administered and Postoperative trial extubation. Plan discussed with CRNA.     Attending anesthesiologist reviewed and agrees with Jalen Jones MD   8/23/2018

## 2018-09-05 ENCOUNTER — OFFICE VISIT (OUTPATIENT)
Dept: SURGERY | Age: 33
End: 2018-09-05

## 2018-09-05 VITALS — SYSTOLIC BLOOD PRESSURE: 104 MMHG | DIASTOLIC BLOOD PRESSURE: 72 MMHG | BODY MASS INDEX: 49.08 KG/M2 | WEIGHT: 243 LBS

## 2018-09-05 DIAGNOSIS — Z09 SURGERY FOLLOW-UP: Primary | ICD-10-CM

## 2018-09-05 PROCEDURE — 99024 POSTOP FOLLOW-UP VISIT: CPT | Performed by: SURGERY

## 2018-09-05 RX ORDER — GABAPENTIN 300 MG/1
300 CAPSULE ORAL 3 TIMES DAILY PRN
Qty: 20 CAPSULE | Refills: 0 | Status: SHIPPED | OUTPATIENT
Start: 2018-09-05 | End: 2019-02-12

## 2018-09-05 NOTE — PROGRESS NOTES
[facility]  SUBJECTIVE:    Carole Will   1985   35 y.o. female presents for routine postoperative followup after excision of sebaceous cyst on neck on 2018. Pathology was benign. The wound has opened and the patient complains of incisional pain. She denies fevers chills or other complaints    Past Medical History:   Diagnosis Date    Asthma     Depression     HTN (hypertension)     Thyroid disease      Past Surgical History:   Procedure Laterality Date     SECTION      OTHER SURGICAL HISTORY Right 2018    EXCISION OF SEBACEOUS CYST OF BACK UPPER NECK     Social History     Social History    Marital status: Single     Spouse name: N/A    Number of children: 1    Years of education: N/A     Occupational History          [employer]     Social History Main Topics    Smoking status: Current Every Day Smoker     Packs/day: 0.25     Types: Cigarettes, E-Cigarettes    Smokeless tobacco: Never Used      Comment: vaping     Alcohol use Yes    Drug use: No    Sexual activity: Yes     Partners: Male     Birth control/ protection: IUD     Other Topics Concern    Not on file     Social History Narrative    No narrative on file      Family History   Problem Relation Age of Onset    Adopted: Yes    Family history unknown: Yes     Current Outpatient Prescriptions   Medication Sig Dispense Refill    gabapentin (NEURONTIN) 300 MG capsule Take 1 capsule by mouth 3 times daily as needed (pain) for up to 20 doses. . 20 capsule 0    diclofenac (VOLTAREN) 75 MG EC tablet TAKE 1 TABLET BY MOUTH TWICE DAILY 60 tablet 3    nystatin (MYCOSTATIN) 335248 UNIT/GM powder Apply topically 4 times daily Apply topically 4 times daily.       FLUoxetine (PROZAC) 20 MG capsule Take 1 capsule by mouth daily 90 capsule 1    atorvastatin (LIPITOR) 20 MG tablet Take 1 tablet by mouth daily 90 tablet 1    albuterol sulfate HFA (VENTOLIN Chronic Kidney Disease: less than 60 ml/min/1.73 sq.m. Kidney Failure: less than 15 ml/min/1.73 sq.m. Results valid for patients 18 years and older.  Calcium 08/23/2018 9.4  8.3 - 10.6 mg/dL Final    WBC 08/23/2018 10.2  4.0 - 11.0 K/uL Final    RBC 08/23/2018 4.68  4.00 - 5.20 M/uL Final    Hemoglobin 08/23/2018 15.7  12.0 - 16.0 g/dL Final    Hematocrit 08/23/2018 46.5  36.0 - 48.0 % Final    MCV 08/23/2018 99.4  80.0 - 100.0 fL Final    MCH 08/23/2018 33.6  26.0 - 34.0 pg Final    MCHC 08/23/2018 33.8  31.0 - 36.0 g/dL Final    RDW 08/23/2018 13.5  12.4 - 15.4 % Final    Platelets 69/82/1639 227  135 - 450 K/uL Final    MPV 08/23/2018 8.8  5.0 - 10.5 fL Final    HCG(Urine) Pregnancy Test 08/23/2018 Negative  Detects HCG level >20 MIU/mL Final    Comment: Note:  False Negative pregnancy results have been reported in  early pregnancy due to insufficient amounts of hCG and in late  first trimester pregnancies, though very rare, due to the hook  effect.   Always repeat results in question with a serum  quantitative pregnancy test. A serum hCG is positive 2-5 days  before the urine pregnancy test.     Orders Only on 08/03/2018   Component Date Value Ref Range Status    Cholesterol, Total 08/03/2018 234* 0 - 199 mg/dL Final    Triglycerides 08/03/2018 206* 0 - 150 mg/dL Final    HDL 08/03/2018 36* 40 - 60 mg/dL Final    LDL Calculated 08/03/2018 157* <100 mg/dL Final    VLDL Cholesterol Calculated 08/03/2018 41  Not Established mg/dL Final    Total Protein 08/03/2018 7.2  6.4 - 8.2 g/dL Final    Alb 08/03/2018 4.3  3.4 - 5.0 g/dL Final    Alkaline Phosphatase 08/03/2018 76  40 - 129 U/L Final    ALT 08/03/2018 29  10 - 40 U/L Final    AST 08/03/2018 21  15 - 37 U/L Final    Total Bilirubin 08/03/2018 <0.2  0.0 - 1.0 mg/dL Final    Bilirubin, Direct 08/03/2018 <0.2  0.0 - 0.3 mg/dL Final    Bilirubin, Indirect 08/03/2018 see below  0.0 - 1.0 mg/dL Final    Comment: Indirect Bilirubin cannot be calculated since Total Bilirubin  and/or Direct Bilirubin is below measurable range.  Vit D, 25-Hydroxy 08/03/2018 30.0  >=30 ng/mL Final    Comment: <=20 ng/mL. ........... Eileen Quiet Deficient  21-29 ng/mL. ......... Eileen Quiet Insufficient  >=30 ng/mL. ........ Eileen Quiet Sufficient     Orders Only on 08/02/2018   Component Date Value Ref Range Status    HPV TYPE 16 08/02/2018 Not Detected  Not Detected Final    HPV TYPE 18 08/02/2018 Not Detected  Not Detected Final    HPVOH (OTHER TYPES) 08/02/2018 DETECTED* Not Detected Final    *Includes 32,71,08,25,38,66,53,06,10,87,13,20 genotypes    HPV Comment 08/02/2018 See below   Final    Comment: **This is information only. See above for results. **    HPV other genotypes: 82,46,32,30,67,44,01,65,19,78,38,73    The Roche Fabiano HPV Test is a qualitative in-vitro test for  the detection of Human Papillomavirus that provides specific  genotyping information for HPV Types 16 and 18, while  concurrently detecting 12 other high-risk HPV types 31,33,35,  74,08,42,07,09,42,67,06,74 in a pooled result. The test  utilizes amplification of target DNA by Polymerase Chain  Reaction (PCR) and nucleic acid hybridization. .         No results found. Pathology:  FINAL DIAGNOSIS:    Posterior neck sebaceous cyst, excision:  - Benign ruptured follicular cyst.  - Negative for malignancy. BUCCA/BUCCA      Assessment:  excision of sebaceous cyst on neck on August 23, 2018. Pathology was benign  Partial wound dehiscence    Plan:  1. Recommend Tylenol and ibuprofen as needed for pain control. We'll try Neurontin as nerve pain may be contributing to her symptoms as well  2. Wound care with wet-to-dry dressings as needed daily  3. May shower and bathe normally  4. No restrictions with regard to activity  5. Follow general surgery office in 2 weeks for a wound check    Alan Dyer MD, FACS  9/5/2018  4:06 PM

## 2018-10-01 ENCOUNTER — OFFICE VISIT (OUTPATIENT)
Dept: INTERNAL MEDICINE CLINIC | Age: 33
End: 2018-10-01
Payer: COMMERCIAL

## 2018-10-01 VITALS
WEIGHT: 247 LBS | HEART RATE: 68 BPM | BODY MASS INDEX: 49.89 KG/M2 | DIASTOLIC BLOOD PRESSURE: 60 MMHG | SYSTOLIC BLOOD PRESSURE: 118 MMHG

## 2018-10-01 DIAGNOSIS — L50.9 HIVES: Primary | ICD-10-CM

## 2018-10-01 DIAGNOSIS — B37.2 CANDIDAL INTERTRIGO: ICD-10-CM

## 2018-10-01 PROCEDURE — G8484 FLU IMMUNIZE NO ADMIN: HCPCS | Performed by: INTERNAL MEDICINE

## 2018-10-01 PROCEDURE — G8417 CALC BMI ABV UP PARAM F/U: HCPCS | Performed by: INTERNAL MEDICINE

## 2018-10-01 PROCEDURE — 4004F PT TOBACCO SCREEN RCVD TLK: CPT | Performed by: INTERNAL MEDICINE

## 2018-10-01 PROCEDURE — G8427 DOCREV CUR MEDS BY ELIG CLIN: HCPCS | Performed by: INTERNAL MEDICINE

## 2018-10-01 PROCEDURE — 99213 OFFICE O/P EST LOW 20 MIN: CPT | Performed by: INTERNAL MEDICINE

## 2018-10-01 RX ORDER — METHYLPREDNISOLONE 4 MG/1
TABLET ORAL
Qty: 1 KIT | Refills: 0 | Status: SHIPPED | OUTPATIENT
Start: 2018-10-01 | End: 2018-10-07

## 2018-10-01 RX ORDER — HYDROXYZINE HYDROCHLORIDE 25 MG/1
25 TABLET, FILM COATED ORAL EVERY 4 HOURS PRN
Qty: 40 TABLET | Refills: 1 | Status: SHIPPED | OUTPATIENT
Start: 2018-10-01 | End: 2020-10-13 | Stop reason: SDUPTHER

## 2018-10-01 RX ORDER — CLOTRIMAZOLE 1 %
CREAM (GRAM) TOPICAL
Qty: 40 G | Refills: 1 | Status: SHIPPED | OUTPATIENT
Start: 2018-10-01 | End: 2018-10-08

## 2018-10-02 NOTE — PROGRESS NOTES
Chief complaint rash    Present illness    The patient is here with a month-long history of recurrent moderately severe urticaria. She advises me that her daughter has some type of court action pending and this has been a source of considerable stress to the patient. She attributes her chronic recurrent urticaria to the stress    Her symptoms are persistent and refractory to over-the-counter treatment. She has been unable to use anything like Aveeno bath secondary to her home facilities.     She has a second rash, she presents a picture which shows typical intertrigo under the breasts    Physical examination  She is in no acute distress  No palpable adenopathy  No wheezing  Cardiac exam normal  No hepatosplenomegaly  Typical urticarial rash  Intertrigo under breasts    Impression  Urticaria  Intertrigo    Plan  Given the severity of her symptoms placed on a Medrol Dosepak    Hydroxyzine may be used on an as-needed basis for the hives and also may help with anxiety    Lotrimin for the intertrigo

## 2018-11-05 ENCOUNTER — OFFICE VISIT (OUTPATIENT)
Dept: INTERNAL MEDICINE CLINIC | Age: 33
End: 2018-11-05
Payer: COMMERCIAL

## 2018-11-05 VITALS
HEIGHT: 59 IN | WEIGHT: 248 LBS | DIASTOLIC BLOOD PRESSURE: 88 MMHG | HEART RATE: 90 BPM | BODY MASS INDEX: 50 KG/M2 | SYSTOLIC BLOOD PRESSURE: 110 MMHG

## 2018-11-05 DIAGNOSIS — M54.42 CHRONIC MIDLINE LOW BACK PAIN WITH BILATERAL SCIATICA: ICD-10-CM

## 2018-11-05 DIAGNOSIS — G89.29 CHRONIC MIDLINE LOW BACK PAIN WITH BILATERAL SCIATICA: ICD-10-CM

## 2018-11-05 DIAGNOSIS — J45.40 MODERATE PERSISTENT ASTHMA WITHOUT COMPLICATION: ICD-10-CM

## 2018-11-05 DIAGNOSIS — F51.01 PRIMARY INSOMNIA: ICD-10-CM

## 2018-11-05 DIAGNOSIS — F32.A DEPRESSION, UNSPECIFIED DEPRESSION TYPE: ICD-10-CM

## 2018-11-05 DIAGNOSIS — M54.41 CHRONIC MIDLINE LOW BACK PAIN WITH BILATERAL SCIATICA: ICD-10-CM

## 2018-11-05 DIAGNOSIS — E78.01 FAMILIAL HYPERCHOLESTEROLEMIA: ICD-10-CM

## 2018-11-05 PROCEDURE — G8484 FLU IMMUNIZE NO ADMIN: HCPCS | Performed by: INTERNAL MEDICINE

## 2018-11-05 PROCEDURE — 4004F PT TOBACCO SCREEN RCVD TLK: CPT | Performed by: INTERNAL MEDICINE

## 2018-11-05 PROCEDURE — 99214 OFFICE O/P EST MOD 30 MIN: CPT | Performed by: INTERNAL MEDICINE

## 2018-11-05 PROCEDURE — G8427 DOCREV CUR MEDS BY ELIG CLIN: HCPCS | Performed by: INTERNAL MEDICINE

## 2018-11-05 PROCEDURE — G8417 CALC BMI ABV UP PARAM F/U: HCPCS | Performed by: INTERNAL MEDICINE

## 2018-11-05 RX ORDER — TIZANIDINE 4 MG/1
4 TABLET ORAL 3 TIMES DAILY
Qty: 90 TABLET | Refills: 0 | Status: SHIPPED | OUTPATIENT
Start: 2018-11-05 | End: 2018-12-03 | Stop reason: SDUPTHER

## 2018-11-05 RX ORDER — ATORVASTATIN CALCIUM 20 MG/1
20 TABLET, FILM COATED ORAL DAILY
Qty: 90 TABLET | Refills: 1 | Status: SHIPPED | OUTPATIENT
Start: 2018-11-05 | End: 2019-06-03 | Stop reason: SDUPTHER

## 2018-11-05 RX ORDER — CHOLECALCIFEROL (VITAMIN D3) 1250 MCG
50000 CAPSULE ORAL WEEKLY
Qty: 12 CAPSULE | Refills: 1 | Status: SHIPPED | OUTPATIENT
Start: 2018-11-05 | End: 2019-07-03 | Stop reason: SDUPTHER

## 2018-11-05 RX ORDER — GABAPENTIN 300 MG/1
300 CAPSULE ORAL 2 TIMES DAILY PRN
Qty: 60 CAPSULE | Refills: 1 | Status: CANCELLED | OUTPATIENT
Start: 2018-11-05 | End: 2019-01-05

## 2018-11-05 RX ORDER — LANOLIN ALCOHOL/MO/W.PET/CERES
3 CREAM (GRAM) TOPICAL DAILY
Qty: 90 TABLET | Refills: 1 | Status: SHIPPED | OUTPATIENT
Start: 2018-11-05 | End: 2019-07-01 | Stop reason: SDUPTHER

## 2018-11-05 RX ORDER — FLUOXETINE HYDROCHLORIDE 20 MG/1
20 CAPSULE ORAL DAILY
Qty: 90 CAPSULE | Refills: 1 | Status: SHIPPED | OUTPATIENT
Start: 2018-11-05 | End: 2019-06-03 | Stop reason: SDUPTHER

## 2018-11-05 RX ORDER — NYSTATIN 100000 [USP'U]/G
POWDER TOPICAL 4 TIMES DAILY
Qty: 1 BOTTLE | Refills: 5 | Status: SHIPPED | OUTPATIENT
Start: 2018-11-05 | End: 2019-02-12 | Stop reason: SDUPTHER

## 2018-11-05 RX ORDER — DICLOFENAC SODIUM 75 MG/1
TABLET, DELAYED RELEASE ORAL
Qty: 180 TABLET | Refills: 1 | Status: SHIPPED | OUTPATIENT
Start: 2018-11-05 | End: 2019-06-03 | Stop reason: SDUPTHER

## 2018-11-05 ASSESSMENT — ENCOUNTER SYMPTOMS
SHORTNESS OF BREATH: 0
ABDOMINAL PAIN: 0
COUGH: 0
BACK PAIN: 1
WHEEZING: 0
PHOTOPHOBIA: 0
VOICE CHANGE: 0
TROUBLE SWALLOWING: 0
VOMITING: 0
NAUSEA: 0

## 2018-11-05 NOTE — LETTER
supplements and oral decongestants. Dependence withdrawal symptoms may include depressed mood, loss of interest, suicidal thoughts, anxiety, fatigue, appetite changes and agitation. Testosterone replacement therapy:  Potential side effects include increased risk of stroke and heart attack, blood clots, increased blood pressure, increased cholesterol, enlarged prostate, sleep apnea, irritability/aggression and other mood disorders, and decreased fertility. Other:     1. I understand that I have the following responsibilities:  · I will take medications at the dose and frequency prescribed. · I will not increase or change how I take my medications without the approval of the health care provider who signs this Medication Agreement. · I will arrange for refills at the prescribed interval ONLY during regular office hours. I will not ask for refills earlier than agreed, after-hours, on holidays or on weekends. · I will obtain all refills for these medications at  ·  ____________________________________  pharmacy (phone number  ·  ________________________), with full consent for my provider and pharmacist to exchange information in writing or verbally. · I will not request any pain medications or controlled substances from other providers and will inform this provider of all other medications I am taking. · I will inform my other health care providers that I am taking these medications and of the existence of this [facility]. In the event of an emergency, I will provide the same information to the emergency department providers. · I will protect my prescriptions and medications. I understand that lost or misplaced prescriptions will not be replaced. · I will keep medications only for my own use and will not share them with others. I will keep all medications away from children. · I agree to participate in any medical, psychological or psychiatric assessments recommended by my provider. · I will actively participate in any program designed to improve function, including social, physical, psychological and daily or work activities. 2. I will not use illegal or street drugs or another person's prescription. If I have an addiction problem with drugs or alcohol and my provider asks me to enter a program to address this issue, I agree to follow through. Such programs may include:  · 12-Step program and securing a sponsor  · Individual counseling   · Inpatient or outpatient treatment  · Other:_____________________________________________________________________________________________________________________________________________    If in treatment, I will request that a copy of the programs initial evaluation and treatment recommendations be sent to this provider and will not expect refills until that is received. I will also request written monthly updates be sent to this provider to verify my continuing treatment. 3. I will consent to drug screening upon my providers request to assure I am only taking the prescribed drugs, described in this MEDICATION AGREEMENT. I understand that a drug screen is a laboratory test in which a sample of my urine, blood or saliva is checked to see what drugs I have been taking. 4. I agree that I will treat the providers and staff at this office with respect at all times. I will keep all of my scheduled appointments, but if I need to cancel my appointment, I will do so a minimum of 24 hours before it is scheduled. 5. I understand that this provider may stop prescribing the medications listed if:  · I do not show any improvement in pain, or my activity has not improved. · I develop rapid tolerance or loss of improvement, as described in my treatment plan. · I develop significant side effects from the medication.   · My behavior is inconsistent with the responsibilities outlined above, which may also result in my being prevented from receiving further care from this office. · Other:____________________________________________________________________    AGREEMENT:    I have read the above and have had all of my questions answered. For chronic disease management, I know that my symptoms can be managed with many types of treatments. A chronic medication trial may be part of my treatment, but I must be an active participant in my care. Medication therapy is only one part of my symptom management plan. In some cases, there may be limited scientific evidence to support the chronic use of certain medications to improve symptoms and daily function. Furthermore, in certain circumstances, there may be scientific information that suggests that use of chronic controlled substances may actually worsen my symptoms and increase my risk of unintentional death directly related to this medication therapy. I know that if my provider feels my risk from controlled medications is greater than my benefit, I will have my controlled substance medication(s) compassionately lowered or removed altogether. I agree to a controlled substance medication trial.      I further agree to allow this office to contact family or friends if there are concerns about my safety and use of the controlled medications. I have agreed to use the following medications above as instructed by my physician and as stated in this [facility].      Patient Signature:  ______________________  Date:11/5/2018 or _____________    Provider Signature:______________________  Date:11/5/2018 or _____________

## 2019-02-12 ENCOUNTER — OFFICE VISIT (OUTPATIENT)
Dept: INTERNAL MEDICINE CLINIC | Age: 34
End: 2019-02-12
Payer: COMMERCIAL

## 2019-02-12 VITALS
HEART RATE: 72 BPM | HEIGHT: 59 IN | SYSTOLIC BLOOD PRESSURE: 120 MMHG | BODY MASS INDEX: 51.41 KG/M2 | WEIGHT: 255 LBS | DIASTOLIC BLOOD PRESSURE: 88 MMHG

## 2019-02-12 DIAGNOSIS — E27.8 LEFT ADRENAL MASS (HCC): ICD-10-CM

## 2019-02-12 DIAGNOSIS — M25.50 ARTHRALGIA OF MULTIPLE JOINTS: Primary | ICD-10-CM

## 2019-02-12 DIAGNOSIS — G89.29 CHRONIC MIDLINE LOW BACK PAIN WITH BILATERAL SCIATICA: ICD-10-CM

## 2019-02-12 DIAGNOSIS — F33.1 MODERATE EPISODE OF RECURRENT MAJOR DEPRESSIVE DISORDER (HCC): ICD-10-CM

## 2019-02-12 DIAGNOSIS — J45.40 MODERATE PERSISTENT ASTHMA WITHOUT COMPLICATION: ICD-10-CM

## 2019-02-12 DIAGNOSIS — M54.42 CHRONIC MIDLINE LOW BACK PAIN WITH BILATERAL SCIATICA: ICD-10-CM

## 2019-02-12 DIAGNOSIS — R21 SKIN RASH: ICD-10-CM

## 2019-02-12 DIAGNOSIS — M54.41 CHRONIC MIDLINE LOW BACK PAIN WITH BILATERAL SCIATICA: ICD-10-CM

## 2019-02-12 DIAGNOSIS — E78.01 FAMILIAL HYPERCHOLESTEROLEMIA: ICD-10-CM

## 2019-02-12 PROCEDURE — 99214 OFFICE O/P EST MOD 30 MIN: CPT | Performed by: INTERNAL MEDICINE

## 2019-02-12 PROCEDURE — G8484 FLU IMMUNIZE NO ADMIN: HCPCS | Performed by: INTERNAL MEDICINE

## 2019-02-12 PROCEDURE — G8427 DOCREV CUR MEDS BY ELIG CLIN: HCPCS | Performed by: INTERNAL MEDICINE

## 2019-02-12 PROCEDURE — G8417 CALC BMI ABV UP PARAM F/U: HCPCS | Performed by: INTERNAL MEDICINE

## 2019-02-12 PROCEDURE — 4004F PT TOBACCO SCREEN RCVD TLK: CPT | Performed by: INTERNAL MEDICINE

## 2019-02-12 RX ORDER — NYSTATIN 100000 [USP'U]/G
POWDER TOPICAL 4 TIMES DAILY
Qty: 1 BOTTLE | Refills: 11 | Status: SHIPPED | OUTPATIENT
Start: 2019-02-12 | End: 2019-07-03 | Stop reason: SDUPTHER

## 2019-02-12 ASSESSMENT — ENCOUNTER SYMPTOMS
TROUBLE SWALLOWING: 0
WHEEZING: 0
ABDOMINAL PAIN: 0
VOMITING: 0
SHORTNESS OF BREATH: 0
VOICE CHANGE: 0
PHOTOPHOBIA: 0
BACK PAIN: 1
NAUSEA: 0

## 2019-03-05 ENCOUNTER — TELEPHONE (OUTPATIENT)
Dept: INTERNAL MEDICINE CLINIC | Age: 34
End: 2019-03-05

## 2019-05-29 ENCOUNTER — HOSPITAL ENCOUNTER (OUTPATIENT)
Age: 34
Discharge: HOME | End: 2019-05-29
Payer: COMMERCIAL

## 2019-05-29 ENCOUNTER — HOSPITAL ENCOUNTER (OUTPATIENT)
Dept: GENERAL RADIOLOGY | Age: 34
Discharge: HOME | End: 2019-05-29
Payer: COMMERCIAL

## 2019-05-29 DIAGNOSIS — G89.29 CHRONIC MIDLINE LOW BACK PAIN WITH BILATERAL SCIATICA: ICD-10-CM

## 2019-05-29 DIAGNOSIS — M54.42 CHRONIC MIDLINE LOW BACK PAIN WITH BILATERAL SCIATICA: ICD-10-CM

## 2019-05-29 DIAGNOSIS — M54.41 CHRONIC MIDLINE LOW BACK PAIN WITH BILATERAL SCIATICA: ICD-10-CM

## 2019-05-29 PROCEDURE — 72100 X-RAY EXAM L-S SPINE 2/3 VWS: CPT

## 2019-06-19 ENCOUNTER — HOSPITAL ENCOUNTER (OUTPATIENT)
Age: 34
Discharge: HOME | End: 2019-06-19
Payer: COMMERCIAL

## 2019-06-19 DIAGNOSIS — M25.50 ARTHRALGIA OF MULTIPLE JOINTS: ICD-10-CM

## 2019-06-19 LAB
ANTI-NUCLEAR ANTIBODY (ANA): NEGATIVE
C-REACTIVE PROTEIN: 11.1 MG/L (ref 0–5.1)
HCT VFR BLD CALC: 42.6 % (ref 36–48)
HEMOGLOBIN: 14.5 G/DL (ref 12–16)
MCH RBC QN AUTO: 33.7 PG (ref 26–34)
MCHC RBC AUTO-ENTMCNC: 34.1 G/DL (ref 31–36)
MCV RBC AUTO: 98.7 FL (ref 80–100)
PDW BLD-RTO: 14 % (ref 12.4–15.4)
PLATELET # BLD: 232 K/UL (ref 135–450)
PMV BLD AUTO: 10.6 FL (ref 5–10.5)
RBC # BLD: 4.31 M/UL (ref 4–5.2)
RHEUMATOID FACTOR: <10 IU/ML
SEDIMENTATION RATE, ERYTHROCYTE: 36 MM/HR (ref 0–20)
WBC # BLD: 8.4 K/UL (ref 4–11)

## 2019-06-19 PROCEDURE — 85652 RBC SED RATE AUTOMATED: CPT

## 2019-06-19 PROCEDURE — 86200 CCP ANTIBODY: CPT

## 2019-06-19 PROCEDURE — 86038 ANTINUCLEAR ANTIBODIES: CPT

## 2019-06-19 PROCEDURE — 36415 COLL VENOUS BLD VENIPUNCTURE: CPT

## 2019-06-19 PROCEDURE — 86431 RHEUMATOID FACTOR QUANT: CPT

## 2019-06-19 PROCEDURE — 85027 COMPLETE CBC AUTOMATED: CPT

## 2019-06-19 PROCEDURE — 86140 C-REACTIVE PROTEIN: CPT

## 2019-06-21 LAB — MISCELLANEOUS LAB TEST ORDER: NORMAL

## 2019-07-03 ENCOUNTER — OFFICE VISIT (OUTPATIENT)
Dept: INTERNAL MEDICINE CLINIC | Age: 34
End: 2019-07-03
Payer: COMMERCIAL

## 2019-07-03 VITALS — HEART RATE: 84 BPM | DIASTOLIC BLOOD PRESSURE: 76 MMHG | SYSTOLIC BLOOD PRESSURE: 100 MMHG

## 2019-07-03 DIAGNOSIS — R79.82 ELEVATED C-REACTIVE PROTEIN (CRP): ICD-10-CM

## 2019-07-03 DIAGNOSIS — E78.01 FAMILIAL HYPERCHOLESTEROLEMIA: ICD-10-CM

## 2019-07-03 DIAGNOSIS — F33.1 MODERATE EPISODE OF RECURRENT MAJOR DEPRESSIVE DISORDER (HCC): ICD-10-CM

## 2019-07-03 DIAGNOSIS — M25.50 ARTHRALGIA, UNSPECIFIED JOINT: ICD-10-CM

## 2019-07-03 DIAGNOSIS — B37.2 CANDIDAL INTERTRIGO: Primary | ICD-10-CM

## 2019-07-03 DIAGNOSIS — M25.50 ARTHRALGIA OF MULTIPLE JOINTS: ICD-10-CM

## 2019-07-03 DIAGNOSIS — R21 SKIN RASH: ICD-10-CM

## 2019-07-03 DIAGNOSIS — J45.40 MODERATE PERSISTENT ASTHMA WITHOUT COMPLICATION: ICD-10-CM

## 2019-07-03 PROCEDURE — G8417 CALC BMI ABV UP PARAM F/U: HCPCS | Performed by: INTERNAL MEDICINE

## 2019-07-03 PROCEDURE — 4004F PT TOBACCO SCREEN RCVD TLK: CPT | Performed by: INTERNAL MEDICINE

## 2019-07-03 PROCEDURE — G8427 DOCREV CUR MEDS BY ELIG CLIN: HCPCS | Performed by: INTERNAL MEDICINE

## 2019-07-03 PROCEDURE — 99214 OFFICE O/P EST MOD 30 MIN: CPT | Performed by: INTERNAL MEDICINE

## 2019-07-03 RX ORDER — CHOLECALCIFEROL (VITAMIN D3) 1250 MCG
50000 CAPSULE ORAL WEEKLY
Qty: 12 CAPSULE | Refills: 1 | Status: SHIPPED | OUTPATIENT
Start: 2019-07-03 | End: 2020-10-13 | Stop reason: SDUPTHER

## 2019-07-03 RX ORDER — PREDNISONE 20 MG/1
20 TABLET ORAL 2 TIMES DAILY
Qty: 10 TABLET | Refills: 0 | Status: SHIPPED | OUTPATIENT
Start: 2019-07-03 | End: 2019-07-08

## 2019-07-03 RX ORDER — NYSTATIN 100000 [USP'U]/G
POWDER TOPICAL 4 TIMES DAILY
Qty: 1 BOTTLE | Refills: 11 | Status: SHIPPED | OUTPATIENT
Start: 2019-07-03 | End: 2020-01-20 | Stop reason: SDUPTHER

## 2019-07-03 ASSESSMENT — ENCOUNTER SYMPTOMS
NAUSEA: 0
CHEST TIGHTNESS: 0
PHOTOPHOBIA: 0
TROUBLE SWALLOWING: 0
VOMITING: 0
ABDOMINAL PAIN: 0
WHEEZING: 1

## 2019-07-03 NOTE — PROGRESS NOTES
Opal Leon  1985  female  29 y.o. SUBJECTIVE:       Chief Complaint   Patient presents with    Weight Loss     12# more active at work.  Otalgia     especially after showers       HPI:  Follow-up visit for chronic problems. Patient has not been taking vitamin D supplement for last couple of months. She feels her anxiety depression is well controlled with current Prozac. Continue to suffer from morning pain affecting mostly the small joint of the both wrist at times knees last more than half an hour. She also gets pain at the times of the day affecting the lower back both area  As well as wrists. Continue to smoke cigarettes. Over the last few days having slightly increased wheezing. She is using albuterol as well as Qvar inhaler. She is requesting refill of nystatin cream for Candida intertrigo. Diet controlled hyperlipidemia. She is trying to lose weight by increasing physical activities. Past Medical History:   Diagnosis Date    Asthma     Depression     HTN (hypertension)     Thyroid disease      Past Surgical History:   Procedure Laterality Date     SECTION      OTHER SURGICAL HISTORY Right 2018    EXCISION OF SEBACEOUS CYST OF BACK UPPER NECK     Social History     Socioeconomic History    Marital status: Single     Spouse name: None    Number of children: 1    Years of education: None    Highest education level: None   Occupational History    Occupation:      Comment: [employer]   Social Needs    Financial resource strain: None    Food insecurity:     Worry: None     Inability: None    Transportation needs:     Medical: None     Non-medical: None   Tobacco Use    Smoking status: Current Every Day Smoker     Packs/day: 0.25     Types: Cigarettes, E-Cigarettes     Start date: 1998    Smokeless tobacco: Never Used    Tobacco comment: vaping    Substance and Sexual Activity    Alcohol use:  Yes    Drug use: No    Sexual activity: Yes     Partners: Male     Birth control/protection: IUD   Lifestyle    Physical activity:     Days per week: None     Minutes per session: None    Stress: None   Relationships    Social connections:     Talks on phone: None     Gets together: None     Attends Hoahaoism service: None     Active member of club or organization: None     Attends meetings of clubs or organizations: None     Relationship status: None    Intimate partner violence:     Fear of current or ex partner: None     Emotionally abused: None     Physically abused: None     Forced sexual activity: None   Other Topics Concern    None   Social History Narrative    None     Family History   Adopted: Yes   Family history unknown: Yes       Review of Systems   Constitutional: Negative for diaphoresis and unexpected weight change. HENT: Negative for trouble swallowing. Eyes: Negative for photophobia. Respiratory: Positive for wheezing. Negative for chest tightness. Cardiovascular: Negative for chest pain and leg swelling. Gastrointestinal: Negative for abdominal pain, nausea and vomiting. Endocrine: Negative for polydipsia and polyphagia. Musculoskeletal: Positive for arthralgias. Neurological: Negative for dizziness and light-headedness. Hematological: Negative for adenopathy. Psychiatric/Behavioral: Negative for hallucinations. The patient is not nervous/anxious. OBJECTIVE:  Pulse Readings from Last 4 Encounters:   07/03/19 84   02/12/19 72   11/05/18 90   10/01/18 68     Wt Readings from Last 4 Encounters:   02/12/19 255 lb (115.7 kg)   11/05/18 248 lb (112.5 kg)   10/01/18 247 lb (112 kg)   09/05/18 243 lb (110.2 kg)     BP Readings from Last 4 Encounters:   07/03/19 100/76   02/12/19 120/88   11/05/18 110/88   10/01/18 118/60     Physical Exam   Constitutional: She is oriented to person, place, and time. She appears well-developed. No distress. Neck: No thyromegaly present.    Cardiovascular: Normal rate,

## 2019-07-24 NOTE — PROGRESS NOTES
Abelino Soto MD  [facility]  [facility] and Rheumatology  Rheumatology Consult Note    HISTORY OF PRESENT ILLNESS:    The pt is a 29 y.o. female referred by Tiera Briones MD for arthralgias and elevated CRP. Pt reports a two yr Hx of chronic lower back and buttock pain (R>L). Pain has gotten worse over the past 2 yrs. Pain is constant and worst in the morning. She reports prolonged morning stiffness in her lower back and buttocks lasting 1-1.5 hrs. She stretches for 15-20 min in the morning and night. She has to be standing and moving at all times, periods of inactivity worsens her Sx. She reports nocturnal awakening from her pain. She has lost flexibility in her back, she has difficulty bending over and side ways. Oral Diclofenac improves pain by 40-45%. She's been combing Diclofenac w/ Aleve every morning. She reports chronic pain in all of her fingers, they have looked \"swollen\" to her since her 25s. Hands feel tight when she wakes up in the morning, she reports difficulty w/ using her hands d/t stiffness. Ankles are painful and swell up at the end of the day. Denies Hx of enthesitis. Denies Hx of PsO, Sx of ocular inflammation or Sx of IBD. Of note, pt reports long standing chronically active hidradenitis suppurativa mostly in her axillae. She states she's received numerous steroid injections and undergone multiple surgical resections in the past.  She states she's never been treated w/ any other IS. She is adopted, does not know x. 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 She currently smokes 4 cig/days, she's been smoking for 20+ yrs.     REVIEW OF SYSTEMS:    Constitutional: +chronic fatigue, she gets poor sleep, denies fever/chills, night sweats, unintentional weight loss  Integumentary: denies photosensitivity, rash, patchy alopecia, fingertips turn pale purple in the cold  Eyes: denies dry eyes, redness or pain, visual narrowing, erosion, and chondrocalcinosis    XR HAND RIGHT (MIN 3 VIEWS)     Standing Status:   Future     Standing Expiration Date:   7/25/2020     Scheduling Instructions:      Evaluate for joint space narrowing, erosion, and chondrocalcinosis     Order Specific Question:   Reason for exam:     Answer:   Evaluate for joint space narrowing, erosion, and chondrocalcinosis    XR SacroilIAC Joints PA and Oblique     Standing Status:   Future     Standing Expiration Date:   8/25/2019     Order Specific Question:   Reason for exam:     Answer:   evaluate for sacroiliitis or evidence of inflammatory changes seen in spondyloarthropathy    XR CERVICAL SPINE (2-3 VIEWS)     Standing Status:   Future     Standing Expiration Date:   7/25/2020     Order Specific Question:   Reason for exam:     Answer:   evaluate for inflammatory changes    Comprehensive Metabolic Panel     Standing Status:   Future     Number of Occurrences:   1     Standing Expiration Date:   1/25/2020    Anti SSA     Standing Status:   Future     Number of Occurrences:   1     Standing Expiration Date:   1/25/2020    Anti SSB     Standing Status:   Future     Number of Occurrences:   1     Standing Expiration Date:   8/70/5131    Cyclic Citrul Peptide Antibody, IgG     Standing Status:   Future     Number of Occurrences:   1     Standing Expiration Date:   1/25/2020    HLA-B27 Antigen     Standing Status:   Future     Number of Occurrences:   1     Standing Expiration Date:   8/25/2019    HIV Screen     Standing Status:   Future     Number of Occurrences:   1     Standing Expiration Date:   8/25/2019    Hepatitis B Core Antibody, Total     Standing Status:   Future     Number of Occurrences:   1     Standing Expiration Date:   8/25/2019    Hepatitis B Surface Antigen     Standing Status:   Future     Number of Occurrences:   1     Standing Expiration Date:   8/25/2019    Hepatitis C Antibody     Standing Status:   Future     Number of

## 2019-07-25 ENCOUNTER — OFFICE VISIT (OUTPATIENT)
Dept: RHEUMATOLOGY | Age: 34
End: 2019-07-25
Payer: COMMERCIAL

## 2019-07-25 VITALS
BODY MASS INDEX: 54.19 KG/M2 | SYSTOLIC BLOOD PRESSURE: 117 MMHG | HEIGHT: 60 IN | HEART RATE: 71 BPM | WEIGHT: 276 LBS | DIASTOLIC BLOOD PRESSURE: 81 MMHG

## 2019-07-25 DIAGNOSIS — Z71.6 ENCOUNTER FOR SMOKING CESSATION COUNSELING: ICD-10-CM

## 2019-07-25 DIAGNOSIS — G89.29 CHRONIC BACK PAIN GREATER THAN 3 MONTHS DURATION: ICD-10-CM

## 2019-07-25 DIAGNOSIS — M25.50 ARTHRALGIA OF MULTIPLE JOINTS: Primary | ICD-10-CM

## 2019-07-25 DIAGNOSIS — Z79.899 HIGH RISK MEDICATION USE: ICD-10-CM

## 2019-07-25 DIAGNOSIS — L73.2 HIDRADENITIS SUPPURATIVA: ICD-10-CM

## 2019-07-25 DIAGNOSIS — R53.82 CHRONIC FATIGUE: ICD-10-CM

## 2019-07-25 DIAGNOSIS — M25.50 ARTHRALGIA OF MULTIPLE JOINTS: ICD-10-CM

## 2019-07-25 DIAGNOSIS — R79.82 ELEVATED C-REACTIVE PROTEIN (CRP): ICD-10-CM

## 2019-07-25 DIAGNOSIS — R70.0 ELEVATED ERYTHROCYTE SEDIMENTATION RATE: ICD-10-CM

## 2019-07-25 DIAGNOSIS — M35.00 SICCA COMPLEX (HCC): ICD-10-CM

## 2019-07-25 DIAGNOSIS — M54.9 CHRONIC BACK PAIN GREATER THAN 3 MONTHS DURATION: ICD-10-CM

## 2019-07-25 LAB
A/G RATIO: 1.8 (ref 1.1–2.2)
ALBUMIN SERPL-MCNC: 4.9 G/DL (ref 3.4–5)
ALP BLD-CCNC: 73 U/L (ref 40–129)
ALT SERPL-CCNC: 19 U/L (ref 10–40)
ANION GAP SERPL CALCULATED.3IONS-SCNC: 18 MMOL/L (ref 3–16)
AST SERPL-CCNC: 14 U/L (ref 15–37)
BILIRUB SERPL-MCNC: 0.4 MG/DL (ref 0–1)
BUN BLDV-MCNC: 9 MG/DL (ref 7–20)
CALCIUM SERPL-MCNC: 10.2 MG/DL (ref 8.3–10.6)
CHLORIDE BLD-SCNC: 99 MMOL/L (ref 99–110)
CO2: 21 MMOL/L (ref 21–32)
CREAT SERPL-MCNC: 0.6 MG/DL (ref 0.6–1.1)
GFR AFRICAN AMERICAN: >60
GFR NON-AFRICAN AMERICAN: >60
GLOBULIN: 2.7 G/DL
GLUCOSE BLD-MCNC: 85 MG/DL (ref 70–99)
POTASSIUM SERPL-SCNC: 4.1 MMOL/L (ref 3.5–5.1)
SODIUM BLD-SCNC: 138 MMOL/L (ref 136–145)
TOTAL PROTEIN: 7.6 G/DL (ref 6.4–8.2)

## 2019-07-25 PROCEDURE — G8427 DOCREV CUR MEDS BY ELIG CLIN: HCPCS | Performed by: INTERNAL MEDICINE

## 2019-07-25 PROCEDURE — G8417 CALC BMI ABV UP PARAM F/U: HCPCS | Performed by: INTERNAL MEDICINE

## 2019-07-25 PROCEDURE — 99244 OFF/OP CNSLTJ NEW/EST MOD 40: CPT | Performed by: INTERNAL MEDICINE

## 2019-07-25 RX ORDER — NAPROXEN 500 MG/1
500 TABLET ORAL 2 TIMES DAILY PRN
Qty: 180 TABLET | Refills: 0 | Status: SHIPPED | OUTPATIENT
Start: 2019-07-25 | End: 2020-01-20 | Stop reason: SDUPTHER

## 2019-07-25 RX ORDER — LIDOCAINE 40 MG/G
CREAM TOPICAL
Refills: 1 | COMMUNITY
Start: 2019-06-05 | End: 2021-05-19 | Stop reason: ALTCHOICE

## 2019-07-26 ENCOUNTER — HOSPITAL ENCOUNTER (OUTPATIENT)
Dept: GENERAL RADIOLOGY | Age: 34
Discharge: HOME | End: 2019-07-26
Payer: COMMERCIAL

## 2019-07-26 ENCOUNTER — HOSPITAL ENCOUNTER (OUTPATIENT)
Age: 34
Discharge: HOME | End: 2019-07-26
Payer: COMMERCIAL

## 2019-07-26 DIAGNOSIS — M25.50 ARTHRALGIA OF MULTIPLE JOINTS: ICD-10-CM

## 2019-07-26 DIAGNOSIS — L73.2 HIDRADENITIS SUPPURATIVA: ICD-10-CM

## 2019-07-26 DIAGNOSIS — G89.29 CHRONIC BACK PAIN GREATER THAN 3 MONTHS DURATION: ICD-10-CM

## 2019-07-26 DIAGNOSIS — M54.9 CHRONIC BACK PAIN GREATER THAN 3 MONTHS DURATION: ICD-10-CM

## 2019-07-26 LAB
ANTI-SS-A IGG: <0.2 AI (ref 0–0.9)
ANTI-SS-B IGG: <0.2 AI (ref 0–0.9)
CYCLIC CITRULLINATED PEPTIDE ANTIBODY IGG: <0.5 U/ML (ref 0–2.9)
HEPATITIS B SURFACE ANTIGEN INTERPRETATION: NORMAL
HEPATITIS C ANTIBODY INTERPRETATION: NORMAL
HIV AG/AB: NORMAL
HIV ANTIGEN: NORMAL
HIV-1 ANTIBODY: NORMAL
HIV-2 AB: NORMAL

## 2019-07-26 PROCEDURE — 73130 X-RAY EXAM OF HAND: CPT

## 2019-07-26 PROCEDURE — 72040 X-RAY EXAM NECK SPINE 2-3 VW: CPT

## 2019-07-26 PROCEDURE — 72200 X-RAY EXAM SI JOINTS: CPT

## 2019-07-27 LAB — HEPATITIS B CORE TOTAL ANTIBODY: NEGATIVE

## 2019-07-28 LAB — HLA B27: NORMAL

## 2019-07-29 DIAGNOSIS — G89.29 CHRONIC BACK PAIN GREATER THAN 3 MONTHS DURATION: ICD-10-CM

## 2019-07-29 DIAGNOSIS — L73.2 HIDRADENITIS SUPPURATIVA: Primary | ICD-10-CM

## 2019-07-29 DIAGNOSIS — R79.82 ELEVATED C-REACTIVE PROTEIN (CRP): ICD-10-CM

## 2019-07-29 DIAGNOSIS — M54.9 CHRONIC BACK PAIN GREATER THAN 3 MONTHS DURATION: ICD-10-CM

## 2019-07-29 LAB
QUANTI TB GOLD PLUS: NEGATIVE
QUANTI TB1 MINUS NIL: 0 IU/ML (ref 0–0.34)
QUANTI TB2 MINUS NIL: 0 IU/ML (ref 0–0.34)
QUANTIFERON MITOGEN: 8.07 IU/ML
QUANTIFERON NIL: 0.01 IU/ML

## 2019-08-08 ENCOUNTER — HOSPITAL ENCOUNTER (OUTPATIENT)
Age: 34
Discharge: HOME | End: 2019-08-08
Payer: COMMERCIAL

## 2019-08-08 DIAGNOSIS — L73.2 HIDRADENITIS SUPPURATIVA: ICD-10-CM

## 2019-08-08 DIAGNOSIS — G89.29 CHRONIC BACK PAIN GREATER THAN 3 MONTHS DURATION: ICD-10-CM

## 2019-08-08 DIAGNOSIS — M54.9 CHRONIC BACK PAIN GREATER THAN 3 MONTHS DURATION: ICD-10-CM

## 2019-08-08 DIAGNOSIS — R79.82 ELEVATED C-REACTIVE PROTEIN (CRP): ICD-10-CM

## 2019-08-08 PROCEDURE — 86812 HLA TYPING A B OR C: CPT

## 2019-08-11 DIAGNOSIS — M54.9 CHRONIC BACK PAIN GREATER THAN 3 MONTHS DURATION: Primary | ICD-10-CM

## 2019-08-11 DIAGNOSIS — G89.29 CHRONIC BACK PAIN GREATER THAN 3 MONTHS DURATION: Primary | ICD-10-CM

## 2019-08-11 DIAGNOSIS — R79.82 ELEVATED C-REACTIVE PROTEIN (CRP): ICD-10-CM

## 2019-08-11 LAB — HLA B27: NORMAL

## 2019-08-13 DIAGNOSIS — G89.29 CHRONIC BACK PAIN GREATER THAN 3 MONTHS DURATION: ICD-10-CM

## 2019-08-13 DIAGNOSIS — M54.9 CHRONIC BACK PAIN GREATER THAN 3 MONTHS DURATION: ICD-10-CM

## 2019-08-13 DIAGNOSIS — R79.82 ELEVATED C-REACTIVE PROTEIN (CRP): ICD-10-CM

## 2019-08-15 ENCOUNTER — OFFICE VISIT (OUTPATIENT)
Dept: RHEUMATOLOGY | Age: 34
End: 2019-08-15
Payer: COMMERCIAL

## 2019-08-15 VITALS
SYSTOLIC BLOOD PRESSURE: 142 MMHG | DIASTOLIC BLOOD PRESSURE: 95 MMHG | BODY MASS INDEX: 46.01 KG/M2 | WEIGHT: 236 LBS | HEART RATE: 76 BPM

## 2019-08-15 DIAGNOSIS — R79.82 ELEVATED C-REACTIVE PROTEIN (CRP): Primary | ICD-10-CM

## 2019-08-15 DIAGNOSIS — G89.29 CHRONIC BACK PAIN GREATER THAN 3 MONTHS DURATION: ICD-10-CM

## 2019-08-15 DIAGNOSIS — M79.7 FIBROMYALGIA: ICD-10-CM

## 2019-08-15 DIAGNOSIS — M25.50 ARTHRALGIA OF MULTIPLE JOINTS: ICD-10-CM

## 2019-08-15 DIAGNOSIS — R70.0 ELEVATED ERYTHROCYTE SEDIMENTATION RATE: ICD-10-CM

## 2019-08-15 DIAGNOSIS — M54.9 CHRONIC BACK PAIN GREATER THAN 3 MONTHS DURATION: ICD-10-CM

## 2019-08-15 PROCEDURE — G8427 DOCREV CUR MEDS BY ELIG CLIN: HCPCS | Performed by: INTERNAL MEDICINE

## 2019-08-15 PROCEDURE — G8417 CALC BMI ABV UP PARAM F/U: HCPCS | Performed by: INTERNAL MEDICINE

## 2019-08-15 PROCEDURE — 4004F PT TOBACCO SCREEN RCVD TLK: CPT | Performed by: INTERNAL MEDICINE

## 2019-08-15 PROCEDURE — 99214 OFFICE O/P EST MOD 30 MIN: CPT | Performed by: INTERNAL MEDICINE

## 2019-08-15 RX ORDER — GABAPENTIN 300 MG/1
300 CAPSULE ORAL 3 TIMES DAILY
Qty: 90 CAPSULE | Refills: 1 | Status: SHIPPED | OUTPATIENT
Start: 2019-08-15 | End: 2019-11-16 | Stop reason: SDUPTHER

## 2019-08-15 NOTE — PROGRESS NOTES
@[facility]@     Abe Amador MD  [facility]) Physicians  [facility] [place] and Rheumatology    Rheumatology Progress Note  SUBJECTIVE:    Background:   Isidra Mcpherson is a 29 y.o. female p/w chronic arthralgias and low back/buttock pain recently found to have elevated inflammatory markers. PMHx pertinent for chronically active hidradenitis suppurativa (follows w/ [facility]), morbid obesity, HLD, anxiety, depression. Current rheum related meds:  Naproxen 500 mg BID  Tizanidine    Prior rheum related meds:  Oral diclofenac and Aleve: combination improved pain by 40-45%    Past medical/surgical history, medications and allergies are reviewed and updated as appropriate. Interval Hx:   Pt states Naproxen is no more effective than Aleve. Worst pain is currently in her R buttock, feels L buttock is hurting more d/t her favoring her R hip. She sleeps on her side. She reports nocturnal awakening from her LBP and leg spasms. She states she's been getting 3 hrs of sleep ever night d/t pain. She feels stiff in her lower back for at least 1-1.5 hrs in the morning. She feels swollen everywhere today. Pt was 11 min late to today's visit, states her ride (mother) didn't show up and she had to walk to the office. Pt is very tearful today, states she's having a difficulty time getting a job and has to take care of her 9 y/o child.         Denies Hx of PsO, Sx of ocular inflammation or Sx of IBD. Of note, pt reports long standing chronically active hidradenitis suppurativa mostly in her axillae. She states she's received numerous steroid injections and undergone multiple surgical resections in the past.  She states she's never been treated w/ any other IS. Recent saw [facility] on 8/7/19 and was prescribed topicals. She's currently smoking 2 cig/day.     Rheumatologic ROS:  Constitutional: +chronic fatigue, she gets poor sleep, denies fever/chills, night sweats, unintentional weight 13.5 cm, diffuse myofascial tenderness as above, b/l SI joints TTP (R>L)   Upper extremities:              Hands: no active synovitis, all joints TTP, able to make full fists w/ fair  strength              Wrist: no synovitis in the wrist joints b/l, FROM              Elbow: no synovitis or bursitis, FROM              Shoulders: no pain or swelling or warmth on palpation, FROM  Lower extremities:              Knees: no warmth or effusion present, FROM, +crepitus              Ankles: small effusion cool and TTP in b/l ankles, dorsiflexion/plantarflexion limited by pain, Achilles tendons w/o swelling or warmth NTTP              Feet: no toe swelling or pain or warmth on palpation w/ FROM, +MTP squeeze test  INTEGUMENTARY: open and erythematous firm nodule on posterior neck, multiple linear scars and sinus tracts in b/l axillae from prior surgical resections for hidradenitis suppurativa, no rash or psoriatic lesions, no petechiae, bruises, or palpable purpura, no patchy alopecia, no nail or periungual changes, no clubbing or digital ulcers  PSYCH: very tearful    DATA:  Labs:    I personally reviewed interval labs and discussed w/ the pt in detail which showed:  Lab Results   Component Value Date    WBC 8.4 06/19/2019    HGB 14.5 06/19/2019    HCT 42.6 06/19/2019    MCV 98.7 06/19/2019     06/19/2019    LYMPHOPCT 37.9 11/05/2017    RBC 4.31 06/19/2019    MCH 33.7 06/19/2019    MCHC 34.1 06/19/2019    RDW 14.0 06/19/2019       Lab Results   Component Value Date     07/25/2019    K 4.1 07/25/2019    CL 99 07/25/2019    CO2 21 07/25/2019    BUN 9 07/25/2019    CREATININE 0.6 07/25/2019    GLUCOSE 85 07/25/2019    CALCIUM 10.2 07/25/2019    PROT 7.6 07/25/2019    LABALBU 4.9 07/25/2019    BILITOT 0.4 07/25/2019    ALKPHOS 73 07/25/2019    AST 14 (L) 07/25/2019    ALT 19 07/25/2019    LABGLOM >60 07/25/2019    GFRAA >60 07/25/2019    AGRATIO 1.8 07/25/2019    GLOB 2.7 07/25/2019     CRP 11.1 mg/L, ESR 36 8/7/19. Pt prescribed Clindamycin gel. Pending rheum w/u, if there is evidence of SpA or SAPHO would consider TNF-I. Pt declined oral steroids, states they caused significant weight gain. Cont NSAID. We briefly discussed fibromyalgia, start Gabapentin titrated up to 300 mg TID. Checked [facility] report, no evidence of Gabapentin abuse.     I counseled the pt on smoking cessation I strongly encouraged her to quit, smoking as this can exacerbate her disease. Rita Foss was seen today for follow-up. Diagnoses and all orders for this visit:    Elevated C-reactive protein (CRP)  -     MRI PELVIS WO CONTRAST; Future  -     NM BONE SCAN WHOLE BODY; Future    Chronic back pain greater than 3 months duration  -     MRI PELVIS WO CONTRAST; Future  -     NM BONE SCAN WHOLE BODY; Future    Arthralgia of multiple joints  -     NM BONE SCAN WHOLE BODY; Future    Elevated erythrocyte sedimentation rate  -     MRI PELVIS WO CONTRAST; Future  -     NM BONE SCAN WHOLE BODY; Future    Fibromyalgia  -     gabapentin (NEURONTIN) 300 MG capsule; Take 1 capsule by mouth 3 times daily for 180 days. Intended supply: 90 days          Orders Placed This Encounter   Procedures    MRI PELVIS WO CONTRAST     Standing Status:   Future     Standing Expiration Date:   8/15/2020     Order Specific Question:   Reason for exam:     Answer:   STIR sequence evaluate for sacroiliitis    NM BONE SCAN WHOLE BODY     Standing Status:   Future     Standing Expiration Date:   8/15/2020     Order Specific Question:   Reason for exam:     Answer:   Evalaute for SAPHO syndrome. Chronic hidradenitis suppurativa, inflammatory back pain, chest wall pain. Outpatient Encounter Medications as of 8/15/2019   Medication Sig Dispense Refill    gabapentin (NEURONTIN) 300 MG capsule Take 1 capsule by mouth 3 times daily for 180 days.  Intended supply: 90 days 90 capsule 1    lidocaine (LMX) 4 % cream APPLY TO THE AFFECTED AREA 1 TO 4 TIMES A DAY PRN  1   

## 2019-08-16 LAB — MISCELLANEOUS LAB TEST ORDER: ABNORMAL

## 2019-08-30 ENCOUNTER — HOSPITAL ENCOUNTER (OUTPATIENT)
Dept: NUCLEAR MEDICINE | Age: 34
Discharge: HOME | End: 2019-08-30
Payer: COMMERCIAL

## 2019-08-30 ENCOUNTER — HOSPITAL ENCOUNTER (OUTPATIENT)
Dept: MRI IMAGING | Age: 34
Discharge: HOME | End: 2019-08-30
Payer: COMMERCIAL

## 2019-08-30 DIAGNOSIS — R70.0 ELEVATED ERYTHROCYTE SEDIMENTATION RATE: ICD-10-CM

## 2019-08-30 DIAGNOSIS — G89.29 CHRONIC BACK PAIN GREATER THAN 3 MONTHS DURATION: ICD-10-CM

## 2019-08-30 DIAGNOSIS — R79.82 ELEVATED C-REACTIVE PROTEIN (CRP): ICD-10-CM

## 2019-08-30 DIAGNOSIS — M54.9 CHRONIC BACK PAIN GREATER THAN 3 MONTHS DURATION: ICD-10-CM

## 2019-08-30 DIAGNOSIS — M25.50 ARTHRALGIA OF MULTIPLE JOINTS: ICD-10-CM

## 2019-08-30 PROCEDURE — 78306 BONE IMAGING WHOLE BODY: CPT

## 2019-08-30 PROCEDURE — 3430000000 HC RX DIAGNOSTIC RADIOPHARMACEUTICAL: Performed by: INTERNAL MEDICINE

## 2019-08-30 PROCEDURE — 72195 MRI PELVIS W/O DYE: CPT

## 2019-08-30 PROCEDURE — A9503 TC99M MEDRONATE: HCPCS | Performed by: INTERNAL MEDICINE

## 2019-08-30 RX ORDER — TC 99M MEDRONATE 20 MG/10ML
26.5 INJECTION, POWDER, LYOPHILIZED, FOR SOLUTION INTRAVENOUS
Status: COMPLETED | OUTPATIENT
Start: 2019-08-30 | End: 2019-08-30

## 2019-08-30 RX ADMIN — TC 99M MEDRONATE 26.5 MILLICURIE: 20 INJECTION, POWDER, LYOPHILIZED, FOR SOLUTION INTRAVENOUS at 08:58

## 2019-09-09 ENCOUNTER — TELEPHONE (OUTPATIENT)
Dept: INTERNAL MEDICINE CLINIC | Age: 34
End: 2019-09-09

## 2019-09-09 DIAGNOSIS — L73.2 HIDRADENITIS SUPPURATIVA: ICD-10-CM

## 2019-09-09 DIAGNOSIS — R21 SKIN RASH: Primary | ICD-10-CM

## 2019-09-09 NOTE — TELEPHONE ENCOUNTER
Pt calling had her tests done that you ordered and is calling to set up appt to come back to discuss-nothing open till 9/26 which she can not do---is there somewhere to work her in. Please call pt. Thanks.

## 2019-11-16 DIAGNOSIS — M79.7 FIBROMYALGIA: ICD-10-CM

## 2019-11-18 RX ORDER — GABAPENTIN 300 MG/1
300 CAPSULE ORAL 3 TIMES DAILY
Qty: 270 CAPSULE | Refills: 2 | Status: SHIPPED | OUTPATIENT
Start: 2019-11-18 | End: 2021-05-19 | Stop reason: ALTCHOICE

## 2019-12-17 DIAGNOSIS — E78.01 FAMILIAL HYPERCHOLESTEROLEMIA: ICD-10-CM

## 2019-12-17 DIAGNOSIS — F32.A DEPRESSION, UNSPECIFIED DEPRESSION TYPE: ICD-10-CM

## 2019-12-17 RX ORDER — ATORVASTATIN CALCIUM 20 MG/1
20 TABLET, FILM COATED ORAL DAILY
Qty: 30 TABLET | Refills: 0 | Status: SHIPPED | OUTPATIENT
Start: 2019-12-17 | End: 2020-01-10

## 2019-12-17 RX ORDER — FLUOXETINE HYDROCHLORIDE 20 MG/1
20 CAPSULE ORAL DAILY
Qty: 30 CAPSULE | Refills: 0 | Status: SHIPPED | OUTPATIENT
Start: 2019-12-17 | End: 2020-01-10

## 2020-01-12 NOTE — PROGRESS NOTES
@[facility]@     Tabatha MD Bhargav  [facility] Physicians  [facility] [place] and Rheumatology    Rheumatology Progress Note  SUBJECTIVE:    Background:   nYes Westfall is a 29 y.o. female w/ chronic arthralgias, chronic LBP, and fibromyalgia. PMHx pertinent for chronically active hidradenitis suppurativa (previously followed by  Dermatology), morbid obesity, HLD, anxiety, depression. Current rheum related meds:  Naproxen 500 mg BID  Tizanidine  Gabapentin 300 mg TID  Clindamycin gel and BPO 5% wash: prescribed by  Dermatology    Prior rheum related meds:  Oral diclofenac and Aleve: combination improved pain by 40-45%    Past medical/surgical history, medications and allergies are reviewed and updated as appropriate. Interval Hx:   Pt was last seen by me in August.  Pt Pt reports chronically active HS involving her b/l axilla and gluteal cleft. Most recently she saw  Dermatology NP on 8/7/19 and was prescribed topicals. Pt states she's never received immunosuppression. She states she's received numerous steroid injections and undergone multiple surgical resections in the past.  She was referred to [address] [place] [address] Dermatology in September but has not made an appt d/t transportation issues. She reports no change to her chronic joint pain involving predominantly her hands and hips. Hands feel stiff for 1-2 hrs every morning. Pt states her pain did not respond to prior oral steroids which caused significant weight gain. Denies Hx of PsO, Sx of ocular inflammation or Sx of IBD. She's currently smoking 2 cig/day.     Rheumatologic ROS:  Constitutional: +chronic fatigue, she gets poor sleep, denies fever/chills, night sweats, unintentional weight loss  Integumentary: denies photosensitivity, rash, patchy alopecia, fingertips turn pale purple in the cold  Eyes: denies dry eyes, redness or pain, visual disturbance, or floaters  Ears: denies hearing loss, tinnitus, vertigo, or recurrent ear test  INTEGUMENTARY: multiple sinus tracts (some open) and fibrotic scarring in b/l axillae and gluteal cleft, +acne on face, no other rash or psoriatic lesions, no petechiae, bruises, or palpable purpura, no patchy alopecia, no nail or periungual changes, no clubbing or digital ulcers    DATA:  Labs: I personally reviewed interval labs and discussed w/ the pt in detail which showed:  Lab Results   Component Value Date    WBC 8.4 06/19/2019    HGB 14.5 06/19/2019    HCT 42.6 06/19/2019    MCV 98.7 06/19/2019     06/19/2019    LYMPHOPCT 37.9 11/05/2017    RBC 4.31 06/19/2019    MCH 33.7 06/19/2019    MCHC 34.1 06/19/2019    RDW 14.0 06/19/2019       Lab Results   Component Value Date     07/25/2019    K 4.1 07/25/2019    CL 99 07/25/2019    CO2 21 07/25/2019    BUN 9 07/25/2019    CREATININE 0.6 07/25/2019    GLUCOSE 85 07/25/2019    CALCIUM 10.2 07/25/2019    PROT 7.6 07/25/2019    LABALBU 4.9 07/25/2019    BILITOT 0.4 07/25/2019    ALKPHOS 73 07/25/2019    AST 14 (L) 07/25/2019    ALT 19 07/25/2019    LABGLOM >60 07/25/2019    GFRAA >60 07/25/2019    AGRATIO 1.8 07/25/2019    GLOB 2.7 07/25/2019     CRP 11.1 mg/L, ESR 36 (6/19/19)  Negative JOSELIN, RF (6/19/19)  Vitamin D 25 hydroxy 30.0 (8/3/18)    Labs from 7/25/19:  Negative CCP  Negative SSA, SSB  Negative hepatitis B and C serologies, HIV screen, Quantiferon TB    +HLA B27 (8/16/19)    Imaging:   I personally reviewed interval imaging and discussed w/ the pt in detail which included:  X-ray L-spine (5/29/19): FINDINGS:   Study is limited secondary to pt body habitus.   Vertebral body height and alignment are preserved.  Intervertebral disc space height is preserved.  No significant degenerative change noted. X-rays (7/26/19):  R hand demonstrates normal bony alignment. No fracture or dislocation. No erosion. No chondrocalcinosis. Ulnar styloid intact. Intercarpal spaces preserved. Interphalangeal joints intact.      L hand demonstrates doses on days 1, 2, 15, then 40 mg SC qwk on day 29. 2 each 0    atorvastatin (LIPITOR) 20 MG tablet TAKE 1 TABLET BY MOUTH DAILY 30 tablet 0    FLUoxetine (PROZAC) 20 MG capsule TAKE 1 CAPSULE BY MOUTH DAILY 30 capsule 0    gabapentin (NEURONTIN) 300 MG capsule Take 1 capsule by mouth 3 times daily for 90 days. 270 capsule 2    lidocaine (LMX) 4 % cream APPLY TO THE AFFECTED AREA 1 TO 4 TIMES A DAY PRN  1    QVAR REDIHALER 80 MCG/ACT AERB inhaler INHALE 1 PUFF BY MOUTH TWICE DAILY 1 Inhaler 5    nystatin (MYCOSTATIN) 728917 UNIT/GM powder Apply topically 4 times daily Apply topically 4 times daily. 1 Bottle 11    Cholecalciferol (VITAMIN D3) 91132 units CAPS Take 50,000 capsules by mouth once a week 12 capsule 1    melatonin 3 MG TABS tablet TAKE 1 TABLET BY MOUTH DAILY 90 tablet 3    tiZANidine (ZANAFLEX) 4 MG tablet TAKE 1 TABLET BY MOUTH THREE TIMES DAILY 90 tablet 5    atorvastatin (LIPITOR) 20 MG tablet TAKE 1 TABLET BY MOUTH DAILY 90 tablet 1    FLUoxetine (PROZAC) 20 MG capsule TAKE 1 CAPSULE BY MOUTH DAILY 90 capsule 1    hydrOXYzine (ATARAX) 25 MG tablet Take 1 tablet by mouth every 4 hours as needed for Itching 40 tablet 1    albuterol sulfate HFA (VENTOLIN HFA) 108 (90 Base) MCG/ACT inhaler Inhale 2 puffs into the lungs every 6 hours as needed for Wheezing 3 Inhaler 1    Levonorgestrel (MIRENA IU) by Intrauterine route.  BENZOYL PEROXIDE 5 % external wash Apply 142 g topically daily      diclofenac (VOLTAREN) 75 MG EC tablet Take 75 mg by mouth daily  1    naproxen (NAPROSYN) 500 MG tablet Take 1 tablet by mouth 2 times daily as needed for Pain (Patient not taking: Reported on 1/15/2020) 180 tablet 0     No facility-administered encounter medications on file as of 1/15/2020. Return in about 2 months (around 3/15/2020) for lab result discussion and treatment plan, medication monitoring.     The risks and benefits of my recommendations, as well as other treatment options, benefits and side effects were discussed with the patient today. Questions were answered. Jennifer Allison MD  [facility]) Physicians  [facility] [place] and Rheumatology  [address] [place] [address] Dr Mcgowan S [facility], [address] [place] [address]  MSYSQ:[phone]  MNY:[phone]    NOTE: This report is transcribed by using voice recognition software dragon. Every effort is made to ensure accuracy; however, inadvertent computerized  transcription errors may be present.      Katja Holguin MD, 1/15/2020 12:04 PM

## 2020-01-15 ENCOUNTER — TELEPHONE (OUTPATIENT)
Dept: RHEUMATOLOGY | Age: 35
End: 2020-01-15

## 2020-01-15 ENCOUNTER — OFFICE VISIT (OUTPATIENT)
Dept: RHEUMATOLOGY | Age: 35
End: 2020-01-15
Payer: COMMERCIAL

## 2020-01-15 VITALS
BODY MASS INDEX: 48.38 KG/M2 | HEIGHT: 59 IN | DIASTOLIC BLOOD PRESSURE: 84 MMHG | WEIGHT: 240 LBS | HEART RATE: 81 BPM | SYSTOLIC BLOOD PRESSURE: 112 MMHG

## 2020-01-15 DIAGNOSIS — M25.50 MULTIPLE JOINT PAIN: ICD-10-CM

## 2020-01-15 DIAGNOSIS — Z79.899 HIGH RISK MEDICATION USE: ICD-10-CM

## 2020-01-15 DIAGNOSIS — L73.2 HIDRADENITIS SUPPURATIVA: ICD-10-CM

## 2020-01-15 DIAGNOSIS — E78.01 FAMILIAL HYPERCHOLESTEROLEMIA: ICD-10-CM

## 2020-01-15 DIAGNOSIS — R79.82 ELEVATED C-REACTIVE PROTEIN (CRP): ICD-10-CM

## 2020-01-15 DIAGNOSIS — Z15.89 HLA B27 (HLA B27 POSITIVE): ICD-10-CM

## 2020-01-15 LAB
BASOPHILS ABSOLUTE: 0.2 K/UL (ref 0–0.2)
BASOPHILS RELATIVE PERCENT: 2.7 %
EOSINOPHILS ABSOLUTE: 0.2 K/UL (ref 0–0.6)
EOSINOPHILS RELATIVE PERCENT: 3.1 %
HCT VFR BLD CALC: 45.6 % (ref 36–48)
HEMOGLOBIN: 15.4 G/DL (ref 12–16)
LYMPHOCYTES ABSOLUTE: 2.9 K/UL (ref 1–5.1)
LYMPHOCYTES RELATIVE PERCENT: 37.8 %
MCH RBC QN AUTO: 33.3 PG (ref 26–34)
MCHC RBC AUTO-ENTMCNC: 33.8 G/DL (ref 31–36)
MCV RBC AUTO: 98.7 FL (ref 80–100)
MONOCYTES ABSOLUTE: 0.7 K/UL (ref 0–1.3)
MONOCYTES RELATIVE PERCENT: 9.8 %
NEUTROPHILS ABSOLUTE: 3.5 K/UL (ref 1.7–7.7)
NEUTROPHILS RELATIVE PERCENT: 46.6 %
PDW BLD-RTO: 14 % (ref 12.4–15.4)
PLATELET # BLD: 264 K/UL (ref 135–450)
PMV BLD AUTO: 10.1 FL (ref 5–10.5)
RBC # BLD: 4.62 M/UL (ref 4–5.2)
WBC # BLD: 7.5 K/UL (ref 4–11)

## 2020-01-15 PROCEDURE — G8417 CALC BMI ABV UP PARAM F/U: HCPCS | Performed by: INTERNAL MEDICINE

## 2020-01-15 PROCEDURE — 99214 OFFICE O/P EST MOD 30 MIN: CPT | Performed by: INTERNAL MEDICINE

## 2020-01-15 PROCEDURE — G8484 FLU IMMUNIZE NO ADMIN: HCPCS | Performed by: INTERNAL MEDICINE

## 2020-01-15 PROCEDURE — G8428 CUR MEDS NOT DOCUMENT: HCPCS | Performed by: INTERNAL MEDICINE

## 2020-01-15 PROCEDURE — 4004F PT TOBACCO SCREEN RCVD TLK: CPT | Performed by: INTERNAL MEDICINE

## 2020-01-15 RX ORDER — DICLOFENAC SODIUM 75 MG/1
75 TABLET, DELAYED RELEASE ORAL DAILY
Refills: 1 | COMMUNITY
Start: 2019-10-23 | End: 2020-01-20

## 2020-01-15 RX ORDER — LIDOCAINE HYDROCHLORIDE 20 MG/ML
2.5 INJECTION, SOLUTION INFILTRATION; PERINEURAL ONCE
Status: CANCELLED | OUTPATIENT
Start: 2020-01-15 | End: 2020-01-15

## 2020-01-15 RX ORDER — TRIAMCINOLONE ACETONIDE 40 MG/ML
10 INJECTION, SUSPENSION INTRA-ARTICULAR; INTRAMUSCULAR ONCE
Status: CANCELLED | OUTPATIENT
Start: 2020-01-15 | End: 2020-01-15

## 2020-01-15 RX ORDER — BENZOYL PEROXIDE 50 MG/ML
142 LIQUID TOPICAL DAILY
COMMUNITY
Start: 2019-12-31

## 2020-01-16 LAB
A/G RATIO: 1.5 (ref 1.1–2.2)
ALBUMIN SERPL-MCNC: 4.4 G/DL (ref 3.4–5)
ALP BLD-CCNC: 73 U/L (ref 40–129)
ALT SERPL-CCNC: 29 U/L (ref 10–40)
ANION GAP SERPL CALCULATED.3IONS-SCNC: 16 MMOL/L (ref 3–16)
AST SERPL-CCNC: 23 U/L (ref 15–37)
BILIRUB SERPL-MCNC: <0.2 MG/DL (ref 0–1)
BUN BLDV-MCNC: 8 MG/DL (ref 7–20)
C-REACTIVE PROTEIN: 12.6 MG/L (ref 0–5.1)
CALCIUM SERPL-MCNC: 9.7 MG/DL (ref 8.3–10.6)
CHLORIDE BLD-SCNC: 100 MMOL/L (ref 99–110)
CHOLESTEROL, FASTING: 234 MG/DL (ref 0–199)
CO2: 21 MMOL/L (ref 21–32)
CREAT SERPL-MCNC: 0.7 MG/DL (ref 0.6–1.1)
GFR AFRICAN AMERICAN: >60
GFR NON-AFRICAN AMERICAN: >60
GLOBULIN: 3 G/DL
GLUCOSE BLD-MCNC: 88 MG/DL (ref 70–99)
HDLC SERPL-MCNC: 44 MG/DL (ref 40–60)
LDL CHOLESTEROL CALCULATED: 164 MG/DL
POTASSIUM SERPL-SCNC: 4.9 MMOL/L (ref 3.5–5.1)
SODIUM BLD-SCNC: 137 MMOL/L (ref 136–145)
TOTAL PROTEIN: 7.4 G/DL (ref 6.4–8.2)
TRIGLYCERIDE, FASTING: 129 MG/DL (ref 0–150)
VLDLC SERPL CALC-MCNC: 26 MG/DL

## 2020-01-17 ENCOUNTER — TELEPHONE (OUTPATIENT)
Dept: RHEUMATOLOGY | Age: 35
End: 2020-01-17

## 2020-01-17 NOTE — TELEPHONE ENCOUNTER
PA submitted for Humira Pen-Ps/UV/Adol HS Start 40MG/0.8ML pen-injector kit on [facility] with clinicals and labs attached. (Key: [id number])     Pending review.

## 2020-01-20 ENCOUNTER — OFFICE VISIT (OUTPATIENT)
Dept: INTERNAL MEDICINE CLINIC | Age: 35
End: 2020-01-20
Payer: COMMERCIAL

## 2020-01-20 VITALS
HEART RATE: 60 BPM | DIASTOLIC BLOOD PRESSURE: 66 MMHG | SYSTOLIC BLOOD PRESSURE: 100 MMHG | HEIGHT: 59 IN | WEIGHT: 244 LBS | BODY MASS INDEX: 49.19 KG/M2

## 2020-01-20 PROCEDURE — 4004F PT TOBACCO SCREEN RCVD TLK: CPT | Performed by: INTERNAL MEDICINE

## 2020-01-20 PROCEDURE — G8484 FLU IMMUNIZE NO ADMIN: HCPCS | Performed by: INTERNAL MEDICINE

## 2020-01-20 PROCEDURE — G8417 CALC BMI ABV UP PARAM F/U: HCPCS | Performed by: INTERNAL MEDICINE

## 2020-01-20 PROCEDURE — 99214 OFFICE O/P EST MOD 30 MIN: CPT | Performed by: INTERNAL MEDICINE

## 2020-01-20 PROCEDURE — G8427 DOCREV CUR MEDS BY ELIG CLIN: HCPCS | Performed by: INTERNAL MEDICINE

## 2020-01-20 RX ORDER — ALBUTEROL SULFATE 90 UG/1
2 AEROSOL, METERED RESPIRATORY (INHALATION) EVERY 6 HOURS PRN
Qty: 1 INHALER | Refills: 5 | Status: SHIPPED | OUTPATIENT
Start: 2020-01-20 | End: 2020-04-20 | Stop reason: SDUPTHER

## 2020-01-20 RX ORDER — ATORVASTATIN CALCIUM 20 MG/1
20 TABLET, FILM COATED ORAL DAILY
Qty: 30 TABLET | Refills: 3 | Status: SHIPPED | OUTPATIENT
Start: 2020-01-20 | End: 2020-04-20 | Stop reason: SDUPTHER

## 2020-01-20 RX ORDER — TIZANIDINE 4 MG/1
TABLET ORAL
Qty: 90 TABLET | Refills: 5 | Status: SHIPPED | OUTPATIENT
Start: 2020-01-20 | End: 2020-04-20 | Stop reason: SDUPTHER

## 2020-01-20 RX ORDER — NYSTATIN 100000 [USP'U]/G
POWDER TOPICAL 4 TIMES DAILY
Qty: 1 BOTTLE | Refills: 11 | Status: SHIPPED | OUTPATIENT
Start: 2020-01-20 | End: 2020-10-13 | Stop reason: SDUPTHER

## 2020-01-20 RX ORDER — FLUOXETINE HYDROCHLORIDE 20 MG/1
20 CAPSULE ORAL DAILY
Qty: 30 CAPSULE | Refills: 5 | Status: SHIPPED | OUTPATIENT
Start: 2020-01-20 | End: 2020-04-20 | Stop reason: SDUPTHER

## 2020-01-20 RX ORDER — NAPROXEN 500 MG/1
500 TABLET ORAL 2 TIMES DAILY PRN
Qty: 180 TABLET | Refills: 0 | Status: SHIPPED | OUTPATIENT
Start: 2020-01-20 | End: 2020-10-13 | Stop reason: SDUPTHER

## 2020-01-20 SDOH — ECONOMIC STABILITY: TRANSPORTATION INSECURITY
IN THE PAST 12 MONTHS, HAS LACK OF TRANSPORTATION KEPT YOU FROM MEETINGS, WORK, OR FROM GETTING THINGS NEEDED FOR DAILY LIVING?: NO

## 2020-01-20 SDOH — ECONOMIC STABILITY: TRANSPORTATION INSECURITY
IN THE PAST 12 MONTHS, HAS THE LACK OF TRANSPORTATION KEPT YOU FROM MEDICAL APPOINTMENTS OR FROM GETTING MEDICATIONS?: NO

## 2020-01-20 SDOH — ECONOMIC STABILITY: FOOD INSECURITY: WITHIN THE PAST 12 MONTHS, YOU WORRIED THAT YOUR FOOD WOULD RUN OUT BEFORE YOU GOT MONEY TO BUY MORE.: NEVER TRUE

## 2020-01-20 SDOH — ECONOMIC STABILITY: INCOME INSECURITY: HOW HARD IS IT FOR YOU TO PAY FOR THE VERY BASICS LIKE FOOD, HOUSING, MEDICAL CARE, AND HEATING?: NOT HARD AT ALL

## 2020-01-20 SDOH — ECONOMIC STABILITY: FOOD INSECURITY: WITHIN THE PAST 12 MONTHS, THE FOOD YOU BOUGHT JUST DIDN'T LAST AND YOU DIDN'T HAVE MONEY TO GET MORE.: NEVER TRUE

## 2020-01-20 SDOH — HEALTH STABILITY: MENTAL HEALTH: HOW OFTEN DO YOU HAVE A DRINK CONTAINING ALCOHOL?: MONTHLY OR LESS

## 2020-01-20 SDOH — HEALTH STABILITY: MENTAL HEALTH: HOW MANY STANDARD DRINKS CONTAINING ALCOHOL DO YOU HAVE ON A TYPICAL DAY?: 1 OR 2

## 2020-01-20 ASSESSMENT — ENCOUNTER SYMPTOMS
TROUBLE SWALLOWING: 0
ABDOMINAL PAIN: 0
NAUSEA: 0
SHORTNESS OF BREATH: 0

## 2020-01-20 NOTE — PROGRESS NOTES
Fidel tadoÂ°  1985  female  29 y.o. SUBJECTIVE:       Chief Complaint   Patient presents with    Other     elevated lipids    Depression     fine, anxiety worsening.  Foot Pain     bilateral--on feet for 12 hours day. HPI:  Follow-up visit. Patient has not been using her asthma medicine regularly. Get occasional wheezing. Requesting refill of medicine. She missed her 3-month appointment last time. She is now working 12 hours a day 6 days a week. She is on her foot for long time and get bilateral foot pain usually get better with rest.    History of multiple joint pain and elevated CRP. She has been waiting to get TNF ordered by rheumatologist.    History of elevated cholesterol. She never started Lipitor.     Still get occasional rash of the groin requesting refill of medication    Her back pain and spasm usually well controlled with current medicine Zanaflex and naproxen    Recent lab have been reviewed    Past Medical History:   Diagnosis Date    Asthma     Depression     HTN (hypertension)     Thyroid disease      Past Surgical History:   Procedure Laterality Date     SECTION      OTHER SURGICAL HISTORY Right 2018    EXCISION OF SEBACEOUS CYST OF BACK UPPER NECK     Social History     Socioeconomic History    Marital status: Single     Spouse name: None    Number of children: 1    Years of education: None    Highest education level: None   Occupational History    Occupation: / mobile phone expert     Comment: [employer]/on feet 12 hr days   Social Needs    Financial resource strain: Not hard at all   Patricia-Andrzej insecurity:     Worry: Never true     Inability: Never true   Crispy Gamer needs:     Medical: No     Non-medical: No   Tobacco Use    Smoking status: Current Every Day Smoker     Packs/day: 0.20     Types: Cigarettes, E-Cigarettes     Start date: 1998    Smokeless tobacco: Never Used    Tobacco comment: vaping    Substance and Sexual Activity    Alcohol use: Yes     Frequency: Monthly or less     Drinks per session: 1 or 2    Drug use: No    Sexual activity: Yes     Partners: Male     Birth control/protection: I.U.D. Lifestyle    Physical activity:     Days per week: None     Minutes per session: None    Stress: None   Relationships    Social connections:     Talks on phone: None     Gets together: None     Attends Mu-ism service: None     Active member of club or organization: None     Attends meetings of clubs or organizations: None     Relationship status: None    Intimate partner violence:     Fear of current or ex partner: None     Emotionally abused: None     Physically abused: None     Forced sexual activity: None   Other Topics Concern    None   Social History Narrative    None     Family History   Adopted: Yes   Family history unknown: Yes       Review of Systems   Constitutional: Negative for diaphoresis and unexpected weight change. HENT: Negative for trouble swallowing. Eyes: Negative for visual disturbance. Respiratory: Negative for shortness of breath. Few scattered wheezing in both lungs. Excellent air entry. Occasional cough. Gastrointestinal: Negative for abdominal pain and nausea. Musculoskeletal: Positive for arthralgias. Neurological: Negative for dizziness and light-headedness. OBJECTIVE:  Pulse Readings from Last 4 Encounters:   01/20/20 60   01/15/20 81   08/15/19 76   07/25/19 71     Wt Readings from Last 4 Encounters:   01/20/20 244 lb (110.7 kg)   01/15/20 240 lb (108.9 kg)   08/15/19 236 lb (107 kg)   07/25/19 276 lb (125.2 kg)     BP Readings from Last 4 Encounters:   01/20/20 100/66   01/15/20 112/84   08/15/19 (!) 142/95   07/25/19 117/81     Physical Exam  Vitals signs and nursing note reviewed. Constitutional:       General: She is not in acute distress. Appearance: She is well-developed. HENT:      Nose: Congestion present.    Eyes:      General: No scleral This Encounter   Procedures    Lipid, Fasting     Standing Status:   Future     Standing Expiration Date:   1/20/2021    CK     Standing Status:   Future     Standing Expiration Date:   1/20/2021    HEPATIC FUNCTION PANEL     Standing Status:   Future     Standing Expiration Date:   1/20/2021     Current Outpatient Medications   Medication Sig Dispense Refill    naproxen (NAPROSYN) 500 MG tablet Take 1 tablet by mouth 2 times daily as needed for Pain 180 tablet 0    nystatin (MYCOSTATIN) 361838 UNIT/GM powder Apply topically 4 times daily Apply topically 4 times daily. 1 Bottle 11    tiZANidine (ZANAFLEX) 4 MG tablet TAKE 1 TABLET BY MOUTH THREE TIMES DAILY 90 tablet 5    FLUoxetine (PROZAC) 20 MG capsule Take 1 capsule by mouth daily 30 capsule 5    beclomethasone (QVAR REDIHALER) 80 MCG/ACT AERB inhaler INHALE 1 PUFF BY MOUTH TWICE DAILY 1 Inhaler 5    albuterol sulfate HFA (VENTOLIN HFA) 108 (90 Base) MCG/ACT inhaler Inhale 2 puffs into the lungs every 6 hours as needed for Wheezing 1 Inhaler 5    atorvastatin (LIPITOR) 20 MG tablet Take 1 tablet by mouth daily 30 tablet 3    BENZOYL PEROXIDE 5 % external wash Apply 142 g topically daily      adalimumab (HUMIRA PEN-PS/UV/ADOL HS START) 40 MG/0.8ML injection Inject 80 mg into the skin once for 1 dose Inject 80 mg SC x 3 doses on days 1, 2, 15, then 40 mg SC qwk on day 29. 2 each 0    gabapentin (NEURONTIN) 300 MG capsule Take 1 capsule by mouth 3 times daily for 90 days.  270 capsule 2    lidocaine (LMX) 4 % cream APPLY TO THE AFFECTED AREA 1 TO 4 TIMES A DAY PRN  1    Cholecalciferol (VITAMIN D3) 93322 units CAPS Take 50,000 capsules by mouth once a week 12 capsule 1    melatonin 3 MG TABS tablet TAKE 1 TABLET BY MOUTH DAILY 90 tablet 3    hydrOXYzine (ATARAX) 25 MG tablet Take 1 tablet by mouth every 4 hours as needed for Itching (Patient taking differently: Take 25 mg by mouth every 4 hours as needed for Itching or Anxiety ) 40 tablet 1   

## 2020-01-27 ENCOUNTER — HOSPITAL ENCOUNTER (OUTPATIENT)
Dept: MRI IMAGING | Age: 35
Discharge: HOME | End: 2020-01-27
Payer: COMMERCIAL

## 2020-01-27 PROCEDURE — 73218 MRI UPPER EXTREMITY W/O DYE: CPT

## 2020-02-07 ENCOUNTER — NURSE TRIAGE (OUTPATIENT)
Dept: OTHER | Facility: CLINIC | Age: 35
End: 2020-02-07

## 2020-02-07 ENCOUNTER — OFFICE VISIT (OUTPATIENT)
Dept: INTERNAL MEDICINE CLINIC | Age: 35
End: 2020-02-07
Payer: COMMERCIAL

## 2020-02-07 ENCOUNTER — HOSPITAL ENCOUNTER (OUTPATIENT)
Dept: VASCULAR LAB | Age: 35
Discharge: HOME | End: 2020-02-07
Payer: COMMERCIAL

## 2020-02-07 VITALS
HEART RATE: 96 BPM | HEIGHT: 59 IN | DIASTOLIC BLOOD PRESSURE: 88 MMHG | SYSTOLIC BLOOD PRESSURE: 124 MMHG | WEIGHT: 243.2 LBS | BODY MASS INDEX: 49.03 KG/M2

## 2020-02-07 DIAGNOSIS — M79.661 RIGHT CALF PAIN: ICD-10-CM

## 2020-02-07 DIAGNOSIS — M79.604 LEG PAIN, DIFFUSE, RIGHT: ICD-10-CM

## 2020-02-07 LAB
ANION GAP SERPL CALCULATED.3IONS-SCNC: 12 MMOL/L (ref 3–16)
BUN BLDV-MCNC: 10 MG/DL (ref 7–20)
CALCIUM SERPL-MCNC: 9.7 MG/DL (ref 8.3–10.6)
CHLORIDE BLD-SCNC: 101 MMOL/L (ref 99–110)
CO2: 23 MMOL/L (ref 21–32)
CREAT SERPL-MCNC: 0.6 MG/DL (ref 0.6–1.1)
D DIMER: 381 NG/ML DDU (ref 0–229)
GFR AFRICAN AMERICAN: >60
GFR NON-AFRICAN AMERICAN: >60
GLUCOSE BLD-MCNC: 97 MG/DL (ref 70–99)
HCT VFR BLD CALC: 43.9 % (ref 36–48)
HEMOGLOBIN: 14.9 G/DL (ref 12–16)
INR BLD: 0.95 (ref 0.86–1.14)
MCH RBC QN AUTO: 32.9 PG (ref 26–34)
MCHC RBC AUTO-ENTMCNC: 33.9 G/DL (ref 31–36)
MCV RBC AUTO: 97 FL (ref 80–100)
PDW BLD-RTO: 13.5 % (ref 12.4–15.4)
PLATELET # BLD: 240 K/UL (ref 135–450)
PMV BLD AUTO: 9.9 FL (ref 5–10.5)
POTASSIUM SERPL-SCNC: 3.9 MMOL/L (ref 3.5–5.1)
PROTHROMBIN TIME: 11 SEC (ref 10–13.2)
RBC # BLD: 4.53 M/UL (ref 4–5.2)
SODIUM BLD-SCNC: 136 MMOL/L (ref 136–145)
WBC # BLD: 9.3 K/UL (ref 4–11)

## 2020-02-07 PROCEDURE — G8417 CALC BMI ABV UP PARAM F/U: HCPCS | Performed by: INTERNAL MEDICINE

## 2020-02-07 PROCEDURE — G8427 DOCREV CUR MEDS BY ELIG CLIN: HCPCS | Performed by: INTERNAL MEDICINE

## 2020-02-07 PROCEDURE — 93971 EXTREMITY STUDY: CPT

## 2020-02-07 PROCEDURE — 99213 OFFICE O/P EST LOW 20 MIN: CPT | Performed by: INTERNAL MEDICINE

## 2020-02-07 PROCEDURE — 4004F PT TOBACCO SCREEN RCVD TLK: CPT | Performed by: INTERNAL MEDICINE

## 2020-02-07 PROCEDURE — G8484 FLU IMMUNIZE NO ADMIN: HCPCS | Performed by: INTERNAL MEDICINE

## 2020-02-07 RX ORDER — METHYLPREDNISOLONE 4 MG/1
TABLET ORAL
Qty: 1 KIT | Refills: 0 | Status: SHIPPED | OUTPATIENT
Start: 2020-02-07 | End: 2020-02-13

## 2020-02-07 ASSESSMENT — ENCOUNTER SYMPTOMS
ABDOMINAL PAIN: 0
WHEEZING: 0
SHORTNESS OF BREATH: 0
NAUSEA: 0
VOICE CHANGE: 0
TROUBLE SWALLOWING: 0

## 2020-02-07 NOTE — LETTER
[facility]  [address] [address]  Phone: [phone]  Fax: [phone]    Julio Hilario MD        February 7, 2020     Patient: Tierra Coleman   YOB: 1985   Date of Visit: 2/7/2020       To Whom it May Concern:    Nanette Fontanez was seen in my clinic on 2/7/2020. She  Should be off work until 02/10/2020. If you have any questions or concerns, please don't hesitate to call.     Sincerely,         Julio Hilario MD

## 2020-02-07 NOTE — PROGRESS NOTES
Subjective:      Chief Complaint   Patient presents with    Back Pain     Pt c/o bilateral back pain that radiates down into right leg. Pt reports that her leg continues to swell and is harder to walk on than the previous day. Pain started Wednesday. Patient ID: Joaquin Dale is a 28 y.o. female. HPI  Patient has been complaining of increasing pain at the right thigh and calf area for the last 2 days. She cannot recall any injury. She feels her right thigh is swollen. She denies any color changes of the right lower leg. She denies any fever chills nausea vomiting. She have history of chronic lower back pain and back pain at times radiate to the leg. Review of Systems   Constitutional: Negative for diaphoresis and unexpected weight change. HENT: Negative for trouble swallowing and voice change. Eyes: Negative for visual disturbance. Respiratory: Negative for shortness of breath and wheezing. Cardiovascular: Negative for chest pain and palpitations. Gastrointestinal: Negative for abdominal pain and nausea. Genitourinary: Negative for difficulty urinating, flank pain and frequency. Neurological: Negative for dizziness and light-headedness. Vitals:    02/07/20 1347   BP: 124/88   Site: Left Upper Arm   Position: Sitting   Cuff Size: Medium Adult   Pulse: 96   Weight: 243 lb 3.2 oz (110.3 kg)   Height: 4' 11\" (1.499 m)         Objective:   Physical Exam  Vitals signs and nursing note reviewed. Constitutional:       Appearance: Normal appearance. Eyes:      General: No scleral icterus. Conjunctiva/sclera: Conjunctivae normal.   Cardiovascular:      Rate and Rhythm: Normal rate and regular rhythm. Pulses: Normal pulses. Heart sounds: Normal heart sounds. Pulmonary:      Effort: Pulmonary effort is normal.      Breath sounds: Normal breath sounds. Abdominal:      General: Bowel sounds are normal.      Palpations: Abdomen is soft.       Comments: obese

## 2020-02-12 ENCOUNTER — OFFICE VISIT (OUTPATIENT)
Dept: INTERNAL MEDICINE CLINIC | Age: 35
End: 2020-02-12
Payer: COMMERCIAL

## 2020-02-12 VITALS
HEART RATE: 96 BPM | DIASTOLIC BLOOD PRESSURE: 72 MMHG | SYSTOLIC BLOOD PRESSURE: 102 MMHG | WEIGHT: 248 LBS | BODY MASS INDEX: 50 KG/M2 | HEIGHT: 59 IN

## 2020-02-12 PROCEDURE — 4004F PT TOBACCO SCREEN RCVD TLK: CPT | Performed by: INTERNAL MEDICINE

## 2020-02-12 PROCEDURE — G8427 DOCREV CUR MEDS BY ELIG CLIN: HCPCS | Performed by: INTERNAL MEDICINE

## 2020-02-12 PROCEDURE — G8417 CALC BMI ABV UP PARAM F/U: HCPCS | Performed by: INTERNAL MEDICINE

## 2020-02-12 PROCEDURE — G8484 FLU IMMUNIZE NO ADMIN: HCPCS | Performed by: INTERNAL MEDICINE

## 2020-02-12 PROCEDURE — 99213 OFFICE O/P EST LOW 20 MIN: CPT | Performed by: INTERNAL MEDICINE

## 2020-02-12 RX ORDER — KETOROLAC TROMETHAMINE 30 MG/ML
30 INJECTION, SOLUTION INTRAMUSCULAR; INTRAVENOUS ONCE
Status: COMPLETED | OUTPATIENT
Start: 2020-02-12 | End: 2020-02-13

## 2020-02-12 ASSESSMENT — ENCOUNTER SYMPTOMS
NAUSEA: 0
SHORTNESS OF BREATH: 0
WHEEZING: 0
ABDOMINAL PAIN: 0
VOMITING: 0

## 2020-02-12 NOTE — LETTER
[facility]  [address] [address]  Phone: [phone]  Fax: [phone]    Deng Pichardo MD        February 12, 2020     Patient: Joaquin Dale   YOB: 1985   Date of Visit: 2/12/2020       To Whom it May Concern:    Rosemary Forrest was seen in my clinic on 2/12/2020. She may return to work on 02/13/2020 with light duty as tolerated by patient. sitting , standing, walking per patient comfort level. If you have any questions or concerns, please don't hesitate to call.     Sincerely,         Deng Pichardo MD

## 2020-02-12 NOTE — PROGRESS NOTES
subsequent encounter  -     Ambulatory referral to Physical Therapy    Work restriction. May continue current muscle relaxant and naproxen starting tomorrow        Orders Placed This Encounter   Procedures    XR KNEE RIGHT (3 VIEWS)     Standing Status:   Future     Standing Expiration Date:   2/12/2021   Darnell Kelly MD, Orthopedic Surgery, [facility]     Referral Priority:   Routine     Referral Type:   Eval and Treat     Referral Reason:   Specialty Services Required     Referred to Provider:   Gael Llamas MD     Requested Specialty:   Orthopedic Surgery     Number of Visits Requested:   1    Ambulatory referral to Physical Therapy     Referral Priority:   Routine     Referral Type:   Eval and Treat     Referral Reason:   Specialty Services Required     Requested Specialty:   Physical Therapy     Number of Visits Requested:   1     Current Outpatient Medications   Medication Sig Dispense Refill    methylPREDNISolone (MEDROL, MK,) 4 MG tablet Take as directed on the packet 1 kit 0    naproxen (NAPROSYN) 500 MG tablet Take 1 tablet by mouth 2 times daily as needed for Pain 180 tablet 0    nystatin (MYCOSTATIN) 826677 UNIT/GM powder Apply topically 4 times daily Apply topically 4 times daily. 1 Bottle 11    tiZANidine (ZANAFLEX) 4 MG tablet TAKE 1 TABLET BY MOUTH THREE TIMES DAILY 90 tablet 5    FLUoxetine (PROZAC) 20 MG capsule Take 1 capsule by mouth daily 30 capsule 5    beclomethasone (QVAR REDIHALER) 80 MCG/ACT AERB inhaler INHALE 1 PUFF BY MOUTH TWICE DAILY 1 Inhaler 5    albuterol sulfate HFA (VENTOLIN HFA) 108 (90 Base) MCG/ACT inhaler Inhale 2 puffs into the lungs every 6 hours as needed for Wheezing 1 Inhaler 5    atorvastatin (LIPITOR) 20 MG tablet Take 1 tablet by mouth daily 30 tablet 3    BENZOYL PEROXIDE 5 % external wash Apply 142 g topically daily      gabapentin (NEURONTIN) 300 MG capsule Take 1 capsule by mouth 3 times daily for 90 days.  270 capsule 2    lidocaine (LMX) 4 % cream APPLY TO THE AFFECTED AREA 1 TO 4 TIMES A DAY PRN  1    Cholecalciferol (VITAMIN D3) 07169 units CAPS Take 50,000 capsules by mouth once a week 12 capsule 1    melatonin 3 MG TABS tablet TAKE 1 TABLET BY MOUTH DAILY 90 tablet 3    hydrOXYzine (ATARAX) 25 MG tablet Take 1 tablet by mouth every 4 hours as needed for Itching (Patient taking differently: Take 25 mg by mouth every 4 hours as needed for Itching or Anxiety ) 40 tablet 1    Levonorgestrel (MIRENA IU) by Intrauterine route.  adalimumab (HUMIRA PEN-PS/UV/ADOL HS START) 40 MG/0.8ML injection Inject 80 mg into the skin once for 1 dose Inject 80 mg SC x 3 doses on days 1, 2, 15, then 40 mg SC qwk on day 29. 2 each 0     Current Facility-Administered Medications   Medication Dose Route Frequency Provider Last Rate Last Dose    ketorolac (TORADOL) injection 30 mg  30 mg Intramuscular Once Perry Bolden MD           Return in about 4 weeks (around 3/11/2020). An After Visit Summary was printed and given to the patient. Documentation was done using voice recognition dragon software. Every effort was made to ensure accuracy; however, inadvertent  Unintentional computerized transcription errors may be present.

## 2020-02-13 PROCEDURE — 96372 THER/PROPH/DIAG INJ SC/IM: CPT | Performed by: INTERNAL MEDICINE

## 2020-02-13 RX ADMIN — KETOROLAC TROMETHAMINE 30 MG: 30 INJECTION, SOLUTION INTRAMUSCULAR; INTRAVENOUS at 07:49

## 2020-02-26 ENCOUNTER — OFFICE VISIT (OUTPATIENT)
Dept: ORTHOPEDIC SURGERY | Age: 35
End: 2020-02-26
Payer: COMMERCIAL

## 2020-02-26 VITALS
SYSTOLIC BLOOD PRESSURE: 110 MMHG | BODY MASS INDEX: 49.19 KG/M2 | WEIGHT: 244 LBS | DIASTOLIC BLOOD PRESSURE: 63 MMHG | HEIGHT: 59 IN | HEART RATE: 87 BPM

## 2020-02-26 PROCEDURE — 99203 OFFICE O/P NEW LOW 30 MIN: CPT | Performed by: ORTHOPAEDIC SURGERY

## 2020-02-26 PROCEDURE — G8484 FLU IMMUNIZE NO ADMIN: HCPCS | Performed by: ORTHOPAEDIC SURGERY

## 2020-02-26 PROCEDURE — 4004F PT TOBACCO SCREEN RCVD TLK: CPT | Performed by: ORTHOPAEDIC SURGERY

## 2020-02-26 PROCEDURE — G8417 CALC BMI ABV UP PARAM F/U: HCPCS | Performed by: ORTHOPAEDIC SURGERY

## 2020-02-26 PROCEDURE — G8427 DOCREV CUR MEDS BY ELIG CLIN: HCPCS | Performed by: ORTHOPAEDIC SURGERY

## 2020-02-26 RX ORDER — CLINDAMYCIN PHOSPHATE 10 MG/G
GEL TOPICAL
COMMUNITY
Start: 2020-01-22 | End: 2020-10-28

## 2020-02-26 RX ORDER — DOXYCYCLINE 100 MG/1
CAPSULE ORAL
COMMUNITY
Start: 2020-01-22 | End: 2020-10-28

## 2020-02-26 RX ORDER — CLINDAMYCIN PHOSPHATE 10 MG/G
GEL TOPICAL
COMMUNITY
Start: 2020-01-22

## 2020-02-26 RX ORDER — DOXYCYCLINE 100 MG/1
CAPSULE ORAL
COMMUNITY
Start: 2020-01-22 | End: 2021-05-19 | Stop reason: ALTCHOICE

## 2020-02-26 NOTE — PROGRESS NOTES
Injury: No  Are there other pain locations you wish to document?: No    Medication Review:  Current Outpatient Medications   Medication Sig Dispense Refill    doxycycline monohydrate (MONODOX) 100 MG capsule TK 1 C PO BID WF      doxycycline monohydrate (MONODOX) 100 MG capsule Take one capsule by mouth twice a day with food      clindamycin (CLINDAGEL) 1 % gel ALEA EXT AA BID      clindamycin (CLINDAGEL) 1 % gel Apply topically to affected areas twice a day      naproxen (NAPROSYN) 500 MG tablet Take 1 tablet by mouth 2 times daily as needed for Pain 180 tablet 0    nystatin (MYCOSTATIN) 632346 UNIT/GM powder Apply topically 4 times daily Apply topically 4 times daily. 1 Bottle 11    tiZANidine (ZANAFLEX) 4 MG tablet TAKE 1 TABLET BY MOUTH THREE TIMES DAILY 90 tablet 5    FLUoxetine (PROZAC) 20 MG capsule Take 1 capsule by mouth daily 30 capsule 5    beclomethasone (QVAR REDIHALER) 80 MCG/ACT AERB inhaler INHALE 1 PUFF BY MOUTH TWICE DAILY 1 Inhaler 5    albuterol sulfate HFA (VENTOLIN HFA) 108 (90 Base) MCG/ACT inhaler Inhale 2 puffs into the lungs every 6 hours as needed for Wheezing 1 Inhaler 5    atorvastatin (LIPITOR) 20 MG tablet Take 1 tablet by mouth daily 30 tablet 3    BENZOYL PEROXIDE 5 % external wash Apply 142 g topically daily      lidocaine (LMX) 4 % cream APPLY TO THE AFFECTED AREA 1 TO 4 TIMES A DAY PRN  1    Cholecalciferol (VITAMIN D3) 62855 units CAPS Take 50,000 capsules by mouth once a week 12 capsule 1    melatonin 3 MG TABS tablet TAKE 1 TABLET BY MOUTH DAILY 90 tablet 3    hydrOXYzine (ATARAX) 25 MG tablet Take 1 tablet by mouth every 4 hours as needed for Itching (Patient taking differently: Take 25 mg by mouth every 4 hours as needed for Itching or Anxiety ) 40 tablet 1    Levonorgestrel (MIRENA IU) by Intrauterine route.       adalimumab (HUMIRA PEN-PS/UV/ADOL HS START) 40 MG/0.8ML injection Inject 80 mg into the skin once for 1 dose Inject 80 mg SC x 3 doses on days 1, 2, 15, then 40 mg SC qwk on day 29. 2 each 0    gabapentin (NEURONTIN) 300 MG capsule Take 1 capsule by mouth 3 times daily for 90 days. 270 capsule 2     No current facility-administered medications for this visit. Review of Systems:  Relevant review of systems reviewed and can be found in the Media section of patient's chart. Medical History:  Past Medical History:   Diagnosis Date    Asthma     Depression     HTN (hypertension)     Thyroid disease         Past Surgical History:   Procedure Laterality Date     SECTION      OTHER SURGICAL HISTORY Right 2018    EXCISION OF SEBACEOUS CYST OF BACK UPPER NECK      Allergies, social and family histories, and medications were reviewed and updated as appropriate. General Exam:  Vital Signs:  /63   Pulse 87   Ht 4' 11\" (1.499 m)   Wt 244 lb (110.7 kg)   BMI 49.28 kg/m²    Constitutional: Patient is adequately groomed with no evidence of malnutrition. Mental Status: The patient is oriented to time, place and person. The patient's mood and affect are appropriate. Lymphatic: The lymphatic examination bilaterally reveals all areas to be without enlargement or induration. Vascular: Examination reveals no swelling or calf tenderness. 2+ palpable pulses distally. Neurological: The patient has good coordination. There is no focal weakness or sensory deficit. Focal examination of right knee is as follows: Inspection & Skin:  Knee shows mild varus alignment. Normal muscle bulk and tone for patient's age and activity level. No significant effusion. There are no rashes, ulcerations or lesions. No erythema. Palpation:  No tenderness on palpation along medial joint line. Moderate tenderness on palpation along lateral joint line. Extensor mechanism intact on palpation with mild tenderness. Range of Motion:     Extension: 0°   Flexion: 110°    Strength:     Quad 4/5.  Hamstrings 4/5.     Hip and ankle motor function are grossly intact. Special Tests:    Does not open to valgus or varus stress at 0 or 30°   Anterior drawer / posterior drawer negative   No posterior sag   Lachman's test negative   Patellar grind negative    Logroll negative    Stinchfield's examination negative    Darcie's sign negative   Posterior tibial pulse are +2/4, capillary refill is brisk, sensation in intact    Gait: Antalgic gait. Walks with single-prong cane. Radiology:     X-rays obtained today and reviewed in office:  Views 3: right knee with comparison AP, flexion PA, and skyline views. Impression: No evidence for acute fracture, subluxation, or dislocation. No lytic or blastic lesions in the metaphyseal regions. Mild joint space narrowing of the medial compartment and early squaring of the medial femoral condyle. Both knees show mild varus habitus. Left knee also shows mild to moderate degenerative changes in the medial compartment. Office Orders/Procedures:  Orders Placed This Encounter   Procedures    XR Knee Bilateral Standing     Standing Status:   Future     Number of Occurrences:   1     Standing Expiration Date:   3/26/2020    XR KNEE RIGHT (1-2 VIEWS)     Standing Status:   Future     Number of Occurrences:   1     Standing Expiration Date:   3/26/2020     Impression:   Diagnosis Orders   1. Right knee pain, unspecified chronicity  XR Knee Bilateral Standing    XR KNEE RIGHT (1-2 VIEWS)   2. 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 Based on imaging and clinical evaluation, I suspect at least a portion of the patient's pain and swelling are due to varicose veins and possible vascular malformations in her right leg / proximal lateral gasctoc. I believe further vascular work up of her right leg is needed via ultrasound and CT angiogram. Also within the differential would be patellar tendonitis contributing to her anterior knee pain. I suggested that she start applying Voltaren gel to relieve her anterior knee pain. She will follow up following her ultrasound and as her symptoms require. I have reviewed patient's pertinent medical history, relevant laboratory and imaging studies, and past surgical history. Patient's medications have been reviewed and were discussed during the visit. Patient was advised to keep future appointments with their respective specialty care team(s). Patient had the opportunity to ask questions, all of which were answered to the best of my ability and with patient satisfaction. Patient understands and is agreeable with the care plan following today's visit. Patient is to schedule an appointment for any new or worsening symptoms. By signing my name below, Juanrylie Arley, attest that this documentation has been prepared under the direction and in the presence of Brittany Golden MD.   Electronically Signed: Ryland Lara, 2/26/20, 1:11 PM.       Chi Camargo MD, personally performed the services described in this documentation. All medical record entries made by the scribe were at my direction and in my presence. I have reviewed the chart and discharge instructions (if applicable) and agree that the record reflects my personal performance and is accurate and complete. Brittany Golden MD, 2/27/20, 9:53 AM.        Some documentation was done using voice recognition dragon software. Every effort was made to ensure accuracy; however, inadvertent unintentional computerized transcription errors may be present.

## 2020-03-18 ENCOUNTER — TELEPHONE (OUTPATIENT)
Dept: RHEUMATOLOGY | Age: 35
End: 2020-03-18

## 2020-03-18 NOTE — TELEPHONE ENCOUNTER
Called pt per Dr Deyanira Jung -  Per Dr angel to reschedule for a few weeks out ,due to the pandemic - if/when pt calls advise them to leave message if refills needed

## 2020-03-18 NOTE — TELEPHONE ENCOUNTER
Spoke with pt and she cancelled appt not working and concerned about virus Pt will cb to reschedule and when needs refill

## 2020-04-20 ENCOUNTER — VIRTUAL VISIT (OUTPATIENT)
Dept: INTERNAL MEDICINE CLINIC | Age: 35
End: 2020-04-20
Payer: COMMERCIAL

## 2020-04-20 PROCEDURE — 99214 OFFICE O/P EST MOD 30 MIN: CPT | Performed by: INTERNAL MEDICINE

## 2020-04-20 PROCEDURE — G8427 DOCREV CUR MEDS BY ELIG CLIN: HCPCS | Performed by: INTERNAL MEDICINE

## 2020-04-20 RX ORDER — ALBUTEROL SULFATE 90 UG/1
2 AEROSOL, METERED RESPIRATORY (INHALATION) EVERY 6 HOURS PRN
Qty: 1 INHALER | Refills: 5 | Status: SHIPPED | OUTPATIENT
Start: 2020-04-20 | End: 2021-03-17 | Stop reason: SDUPTHER

## 2020-04-20 RX ORDER — ATORVASTATIN CALCIUM 20 MG/1
20 TABLET, FILM COATED ORAL DAILY
Qty: 30 TABLET | Refills: 3 | Status: SHIPPED | OUTPATIENT
Start: 2020-04-20 | End: 2020-12-31 | Stop reason: SDUPTHER

## 2020-04-20 RX ORDER — FLUOXETINE HYDROCHLORIDE 20 MG/1
20 CAPSULE ORAL DAILY
Qty: 30 CAPSULE | Refills: 5 | Status: SHIPPED | OUTPATIENT
Start: 2020-04-20 | End: 2020-10-13 | Stop reason: SDUPTHER

## 2020-04-20 RX ORDER — TIZANIDINE 4 MG/1
TABLET ORAL
Qty: 90 TABLET | Refills: 5 | Status: SHIPPED | OUTPATIENT
Start: 2020-04-20 | End: 2020-10-13 | Stop reason: SDUPTHER

## 2020-04-20 ASSESSMENT — ENCOUNTER SYMPTOMS
VOMITING: 0
ABDOMINAL PAIN: 0
WHEEZING: 1
NAUSEA: 0
COUGH: 1
BACK PAIN: 1
SHORTNESS OF BREATH: 1

## 2020-04-20 NOTE — PROGRESS NOTES
(LIPITOR) 20 MG tablet Take 1 tablet by mouth daily Yes Donaciano Prader, MD   tiZANidine (ZANAFLEX) 4 MG tablet TAKE 1 TABLET BY MOUTH THREE TIMES DAILY Yes Trixie Ramos MD   albuterol sulfate HFA (VENTOLIN HFA) 108 (90 Base) MCG/ACT inhaler Inhale 2 puffs into the lungs every 6 hours as needed for Wheezing Yes Donaciano Prader, MD   doxycycline monohydrate (MONODOX) 100 MG capsule TK 1 C PO BID WF  Historical Provider, MD   doxycycline monohydrate (MONODOX) 100 MG capsule Take one capsule by mouth twice a day with food  Historical Provider, MD   clindamycin (CLINDAGEL) 1 % gel ALEA EXT AA BID  Historical Provider, MD   clindamycin (CLINDAGEL) 1 % gel Apply topically to affected areas twice a day  Historical Provider, MD   naproxen (NAPROSYN) 500 MG tablet Take 1 tablet by mouth 2 times daily as needed for Pain  Donaciano Prader, MD   nystatin (MYCOSTATIN) 666864 UNIT/GM powder Apply topically 4 times daily Apply topically 4 times daily. SANCHO Walker MD   BENZOYL PEROXIDE 5 % external wash Apply 142 g topically daily  Historical Provider, MD   adalimumab (HUMIRA PEN-PS/UV/ADOL HS START) 40 MG/0.8ML injection Inject 80 mg into the skin once for 1 dose Inject 80 mg SC x 3 doses on days 1, 2, 15, then 40 mg SC qwk on day 29. Tamia Ramírez MD   gabapentin (NEURONTIN) 300 MG capsule Take 1 capsule by mouth 3 times daily for 90 days. Tamia Ramírez MD   lidocaine (LMX) 4 % cream APPLY TO THE AFFECTED AREA 1 TO 4 TIMES A DAY PRN  Historical Provider, MD   Cholecalciferol (VITAMIN D3) 63173 units CAPS Take 50,000 capsules by mouth once a week  Donaciano Prader, MD   melatonin 3 MG TABS tablet TAKE 1 TABLET BY MOUTH DAILY  SANCHO Ramos MD   hydrOXYzine (ATARAX) 25 MG tablet Take 1 tablet by mouth every 4 hours as needed for Itching  Patient taking differently: Take 25 mg by mouth every 4 hours as needed for Itching or Anxiety   Shawn Weaver MD   Levonorgestrel (MIRENA IU) by Intrauterine route. Historical Provider, MD       Social History     Tobacco Use    Smoking status: Current Every Day Smoker     Packs/day: 0.20     Types: Cigarettes, E-Cigarettes     Start date: 1998    Smokeless tobacco: Never Used    Tobacco comment: vaping    Substance Use Topics    Alcohol use: Yes     Frequency: Monthly or less     Drinks per session: 1 or 2    Drug use: No        Allergies   Allergen Reactions    Chantix [Varenicline]      Suicidal thoughts    Seroquel [Quetiapine Fumarate]      Bone pain   ,   Past Medical History:   Diagnosis Date    Asthma     Depression     HTN (hypertension)     Thyroid disease    ,   Past Surgical History:   Procedure Laterality Date     SECTION  2008    OTHER SURGICAL HISTORY Right 2018    EXCISION OF SEBACEOUS CYST OF BACK UPPER NECK   ,   Social History     Tobacco Use    Smoking status: Current Every Day Smoker     Packs/day: 0.20     Types: Cigarettes, E-Cigarettes     Start date: 1998    Smokeless tobacco: Never Used    Tobacco comment: vaping    Substance Use Topics    Alcohol use: Yes     Frequency: Monthly or less     Drinks per session: 1 or 2    Drug use: No   ,   Family History   Adopted: Yes   Family history unknown: Yes       PHYSICAL EXAMINATION:  [ INSTRUCTIONS:  \"[x]\" Indicates a positive item  \"[]\" Indicates a negative item  -- DELETE ALL ITEMS NOT EXAMINED]  Vital Signs: (As obtained by patient/caregiver or practitioner observation)      Constitutional: [x] Appears well-developed and well-nourished [x] No apparent distress      [] Abnormal-   Mental status  [x] Alert and awake  [x] Oriented to person/place/time [x]Able to follow commands      Eyes:  EOM    [x]  Normal  [] Abnormal-  Sclera  [x]  Normal  [] Abnormal -         Discharge [x]  None visible  [] Abnormal -    HENT:   [] Normocephalic, atraumatic.   [] Abnormal   [] Mouth/Throat: Mucous membranes are moist.     External Ears [] Normal  [] Abnormal-     Neck: [] No

## 2020-10-09 ENCOUNTER — TELEPHONE (OUTPATIENT)
Dept: INTERNAL MEDICINE CLINIC | Age: 35
End: 2020-10-09

## 2020-10-09 NOTE — TELEPHONE ENCOUNTER
----- Message from Joey Dottie sent at 10/9/2020  4:15 PM EDT -----  Subject: Message to Provider    QUESTIONS  Information for Provider? Patient would like to know if she get a high   dosage on FLUoxetine (PROZAC) 20 MG capsule. It's not doing what it's   supposed to do.  ---------------------------------------------------------------------------  --------------  CALL BACK INFO  What is the best way for the office to contact you? OK to leave message on   voicemail  Preferred Call Back Phone Number? [phone]  ---------------------------------------------------------------------------  --------------  SCRIPT ANSWERS  Relationship to Patient?  Self Statement Selected

## 2020-10-13 ENCOUNTER — VIRTUAL VISIT (OUTPATIENT)
Dept: INTERNAL MEDICINE CLINIC | Age: 35
End: 2020-10-13
Payer: COMMERCIAL

## 2020-10-13 PROBLEM — E66.01 MORBIDLY OBESE (HCC): Status: ACTIVE | Noted: 2020-10-13

## 2020-10-13 PROBLEM — M35.00 SICCA COMPLEX (HCC): Status: ACTIVE | Noted: 2020-10-13

## 2020-10-13 PROCEDURE — G8427 DOCREV CUR MEDS BY ELIG CLIN: HCPCS | Performed by: INTERNAL MEDICINE

## 2020-10-13 PROCEDURE — 99214 OFFICE O/P EST MOD 30 MIN: CPT | Performed by: INTERNAL MEDICINE

## 2020-10-13 RX ORDER — CHOLECALCIFEROL (VITAMIN D3) 1250 MCG
50000 CAPSULE ORAL WEEKLY
Qty: 12 CAPSULE | Refills: 1 | Status: SHIPPED | OUTPATIENT
Start: 2020-10-13 | End: 2021-04-16 | Stop reason: SDUPTHER

## 2020-10-13 RX ORDER — NYSTATIN 100000 [USP'U]/G
POWDER TOPICAL 4 TIMES DAILY
Qty: 1 BOTTLE | Refills: 11 | Status: SHIPPED | OUTPATIENT
Start: 2020-10-13 | End: 2022-07-07 | Stop reason: SDUPTHER

## 2020-10-13 RX ORDER — NAPROXEN 500 MG/1
500 TABLET ORAL 2 TIMES DAILY PRN
Qty: 180 TABLET | Refills: 1 | Status: SHIPPED | OUTPATIENT
Start: 2020-10-13 | End: 2021-03-17 | Stop reason: SDUPTHER

## 2020-10-13 RX ORDER — TIZANIDINE 4 MG/1
TABLET ORAL
Qty: 90 TABLET | Refills: 5 | Status: SHIPPED | OUTPATIENT
Start: 2020-10-13 | End: 2021-03-17 | Stop reason: SDUPTHER

## 2020-10-13 RX ORDER — FLUOXETINE HYDROCHLORIDE 20 MG/1
20 CAPSULE ORAL DAILY
Qty: 30 CAPSULE | Refills: 5 | Status: SHIPPED | OUTPATIENT
Start: 2020-10-13 | End: 2021-03-17 | Stop reason: SDUPTHER

## 2020-10-13 RX ORDER — HYDROXYZINE HYDROCHLORIDE 25 MG/1
25 TABLET, FILM COATED ORAL EVERY 8 HOURS PRN
Qty: 40 TABLET | Refills: 1 | Status: SHIPPED | OUTPATIENT
Start: 2020-10-13 | End: 2020-12-31 | Stop reason: SDUPTHER

## 2020-10-13 ASSESSMENT — ENCOUNTER SYMPTOMS
TROUBLE SWALLOWING: 0
VOICE CHANGE: 0
SHORTNESS OF BREATH: 0
COUGH: 0
BACK PAIN: 1
WHEEZING: 0

## 2020-10-13 NOTE — PROGRESS NOTES
4 times daily Apply topically 4 times daily. Yes Cheikh Antoine MD   tiZANidine (ZANAFLEX) 4 MG tablet TAKE 1 TABLET BY MOUTH THREE TIMES DAILY Yes Cheikh Antoine MD   fluticasone-salmeterol (ADVAIR) 250-50 MCG/DOSE AEPB Inhale 1 puff into the lungs every 12 hours Yes Cheikh Antoine MD   Cholecalciferol (VITAMIN D3) 1.25 MG (41005 UT) CAPS Take 50,000 capsules by mouth once a week Yes Cheikh Antoine MD   hydrOXYzine (ATARAX) 25 MG tablet Take 1 tablet by mouth every 8 hours as needed for Anxiety Yes Cheikh Antoine MD   atorvastatin (LIPITOR) 20 MG tablet Take 1 tablet by mouth daily Yes Cheikh Antoine MD   albuterol sulfate HFA (VENTOLIN HFA) 108 (90 Base) MCG/ACT inhaler Inhale 2 puffs into the lungs every 6 hours as needed for Wheezing Yes Cheikh Antoine MD   doxycycline monohydrate (MONODOX) 100 MG capsule TK 1 C PO BID WF Yes Historical Provider, MD   doxycycline monohydrate (MONODOX) 100 MG capsule Take one capsule by mouth twice a day with food Yes Historical Provider, MD   clindamycin (CLINDAGEL) 1 % gel ALEA EXT AA BID Yes Historical Provider, MD   clindamycin (CLINDAGEL) 1 % gel Apply topically to affected areas twice a day Yes Historical Provider, MD   BENZOYL PEROXIDE 5 % external wash Apply 142 g topically daily Yes Historical Provider, MD   gabapentin (NEURONTIN) 300 MG capsule Take 1 capsule by mouth 3 times daily for 90 days. Yes Dianah Moritz, MD   melatonin 3 MG TABS tablet TAKE 1 TABLET BY MOUTH DAILY Yes Cheikh Antoine MD   Levonorgestrel (MIRENA IU) by Intrauterine route. Yes Historical Provider, MD   adalimumab (HUMIRA PEN-PS/UV/ADOL HS START) 40 MG/0.8ML injection Inject 80 mg into the skin once for 1 dose Inject 80 mg SC x 3 doses on days 1, 2, 15, then 40 mg SC qwk on day 29.   Patient not taking: Reported on 10/13/2020  Dianah Moritz, MD   lidocaine (LMX) 4 % cream APPLY TO THE AFFECTED AREA 1 TO 4 TIMES A DAY PRN  Historical Provider, MD       Social History Tobacco Use    Smoking status: Current Every Day Smoker     Packs/day: 0.20     Types: Cigarettes, E-Cigarettes     Start date: 1998    Smokeless tobacco: Never Used    Tobacco comment: vaping    Substance Use Topics    Alcohol use: Yes     Frequency: Monthly or less     Drinks per session: 1 or 2    Drug use: No        Allergies   Allergen Reactions    Chantix [Varenicline]      Suicidal thoughts    Seroquel [Quetiapine Fumarate]      Bone pain   ,   Past Medical History:   Diagnosis Date    Asthma     Depression     HTN (hypertension)     Thyroid disease    ,   Past Surgical History:   Procedure Laterality Date     SECTION      OTHER SURGICAL HISTORY Right 2018    EXCISION OF SEBACEOUS CYST OF BACK UPPER NECK   ,   Social History     Tobacco Use    Smoking status: Current Every Day Smoker     Packs/day: 0.20     Types: Cigarettes, E-Cigarettes     Start date: 1998    Smokeless tobacco: Never Used    Tobacco comment: vaping    Substance Use Topics    Alcohol use: Yes     Frequency: Monthly or less     Drinks per session: 1 or 2    Drug use: No   ,   Family History   Adopted: Yes   Family history unknown: Yes   ,   Health Maintenance   Topic Date Due    Varicella vaccine (1 of 2 - 2-dose childhood series) 1986    Pneumococcal 0-64 years Vaccine (1 of 1 - PPSV23) 1991    DTaP/Tdap/Td vaccine (1 - Tdap) 2004    Flu vaccine (1) 2020    Lipid screen  01/15/2021    Cervical cancer screen  2023    HIV screen  Completed    Hepatitis A vaccine  Aged Out    Hepatitis B vaccine  Aged Out    Hib vaccine  Aged Out    Meningococcal (ACWY) vaccine  Aged Out       PHYSICAL EXAMINATION:  [ INSTRUCTIONS:  \"[x]\" Indicates a positive item  \"[]\" Indicates a negative item  -- DELETE ALL ITEMS NOT EXAMINED]  Vital Signs: (As obtained by patient/caregiver or practitioner observation)      Constitutional: [x] Appears well-developed and well-nourished [] No apparent distress      [] Abnormal-obese. She is not interested with weight loss surgery. Mental status  [x] Alert and awake  [x] Oriented to person/place/time [x]Able to follow commands      Eyes:  EOM    []  Normal  [] Abnormal-  Sclera  [x]  Normal  [] Abnormal -         Discharge [x]  None visible  [] Abnormal -    HENT:   [] Normocephalic, atraumatic. [] Abnormal   [] Mouth/Throat: Mucous membranes are moist.     External Ears [] Normal  [] Abnormal-     Neck: [] No visualized mass     Pulmonary/Chest: [x] Respiratory effort normal.  [x] No visualized signs of difficulty breathing or respiratory distress        [] Abnormal-      Musculoskeletal:   [] Normal gait with no signs of ataxia         [] Normal range of motion of neck        [] Abnormal-       Neurological:        [x] No Facial Asymmetry (Cranial nerve 7 motor function) (limited exam to video visit)          [x] No gaze palsy        [] Abnormal-         Skin:        [] No significant exanthematous lesions or discoloration noted on facial skin         [] Abnormal-            Psychiatric:       [x] Normal Affect [x] No Hallucinations        [] Abnormal-     Other pertinent observable physical exam findings-     ASSESSMENT/PLAN:  1. Depression, unspecified depression type  We will need to start with Prozac 20 mg/day. May consider increasing this medicine in 2 to 4 weeks. - FLUoxetine (PROZAC) 20 MG capsule; Take 1 capsule by mouth daily  Dispense: 30 capsule; Refill: 5    2. Sicca complex ([facility] [address]  Have no worsening symptoms from baseline. 3. Moderate episode of recurrent major depressive disorder ([facility] [address]  See above management. 4. Left adrenal mass (HCC)  It was a incidental finding about 1.5 cm size. We will do a follow-up study to see the stability of the adrenal mass. - BASIC METABOLIC PANEL; Future  - CT ABDOMEN PELVIS W WO CONTRAST Additional Contrast? Radiologist Recommendation; Future    5.  Morbidly obese ([address]  She is encouraged to increase physical activity as well as diet lifestyle changes. He is not interested for definitive treatment. 6. Familial hypercholesterolemia  She reports to me taking atorvastatin regularly without any medicine side effect.  - Lipid, Fasting; Future      Return in about 4 weeks (around 11/10/2020) for depression. Kiko Metz is a 28 y.o. female being evaluated by a Virtual Visit (video visit) encounter to address concerns as mentioned above. A caregiver was present when appropriate. Due to this being a TeleHealth encounter (During LKG-50 public health emergency), evaluation of the following organ systems was limited: Vitals/Constitutional/EENT/Resp/CV/GI//MS/Neuro/Skin/Heme-Lymph-Imm. Pursuant to the emergency declaration under the [address] [place] [address], [address] [place] [address] authority and the [employer] and [employer] Act, this Virtual Visit was conducted with patient's (and/or legal guardian's) consent, to reduce the patient's risk of exposure to COVID-19 and provide necessary medical care. The patient (and/or legal guardian) has also been advised to contact this office for worsening conditions or problems, and seek emergency medical treatment and/or call 911 if deemed necessary. Patient identification was verified at the start of the visit: Yes    Total time spent on this encounter: Not billed by time    Services were provided through a video synchronous discussion virtually to substitute for in-person clinic visit. Patient and provider were located at their individual homes. --Edu Velez MD on 10/13/2020 at 7:12 PM    An electronic signature was used to authenticate this note.

## 2020-10-21 LAB
HPV COMMENT: NORMAL
HPV TYPE 16: NOT DETECTED
HPV TYPE 18: NOT DETECTED
HPVOH (OTHER TYPES): NOT DETECTED

## 2020-10-22 ENCOUNTER — HOSPITAL ENCOUNTER (OUTPATIENT)
Dept: CT IMAGING | Age: 35
Discharge: HOME | End: 2020-10-22
Payer: COMMERCIAL

## 2020-10-22 ENCOUNTER — HOSPITAL ENCOUNTER (OUTPATIENT)
Age: 35
Discharge: HOME | End: 2020-10-22
Payer: COMMERCIAL

## 2020-10-22 ENCOUNTER — HOSPITAL ENCOUNTER (OUTPATIENT)
Dept: VASCULAR LAB | Age: 35
Discharge: HOME | End: 2020-10-22
Payer: COMMERCIAL

## 2020-10-22 LAB
ANION GAP SERPL CALCULATED.3IONS-SCNC: 10 MMOL/L (ref 3–16)
BUN BLDV-MCNC: 10 MG/DL (ref 7–20)
CALCIUM SERPL-MCNC: 9.7 MG/DL (ref 8.3–10.6)
CHLORIDE BLD-SCNC: 105 MMOL/L (ref 99–110)
CHOLESTEROL, TOTAL: 154 MG/DL (ref 0–199)
CO2: 23 MMOL/L (ref 21–32)
CREAT SERPL-MCNC: 0.7 MG/DL (ref 0.6–1.1)
GFR AFRICAN AMERICAN: >60
GFR NON-AFRICAN AMERICAN: >60
GLUCOSE BLD-MCNC: 101 MG/DL (ref 70–99)
HDLC SERPL-MCNC: 40 MG/DL (ref 40–60)
LDL CHOLESTEROL CALCULATED: 83 MG/DL
POTASSIUM SERPL-SCNC: 4.1 MMOL/L (ref 3.5–5.1)
SODIUM BLD-SCNC: 138 MMOL/L (ref 136–145)
TRIGL SERPL-MCNC: 157 MG/DL (ref 0–150)
VLDLC SERPL CALC-MCNC: 31 MG/DL

## 2020-10-22 PROCEDURE — 80061 LIPID PANEL: CPT

## 2020-10-22 PROCEDURE — 73706 CT ANGIO LWR EXTR W/O&W/DYE: CPT

## 2020-10-22 PROCEDURE — 6360000004 HC RX CONTRAST MEDICATION: Performed by: ORTHOPAEDIC SURGERY

## 2020-10-22 PROCEDURE — 36415 COLL VENOUS BLD VENIPUNCTURE: CPT

## 2020-10-22 PROCEDURE — 80048 BASIC METABOLIC PNL TOTAL CA: CPT

## 2020-10-22 PROCEDURE — 93971 EXTREMITY STUDY: CPT

## 2020-10-22 RX ADMIN — IOPAMIDOL 100 ML: 755 INJECTION, SOLUTION INTRAVENOUS at 08:18

## 2020-10-28 ENCOUNTER — OFFICE VISIT (OUTPATIENT)
Dept: ORTHOPEDIC SURGERY | Age: 35
End: 2020-10-28
Payer: COMMERCIAL

## 2020-10-28 VITALS — BODY MASS INDEX: 47.37 KG/M2 | HEIGHT: 59 IN | TEMPERATURE: 97.9 F | WEIGHT: 235 LBS

## 2020-10-28 PROBLEM — Q27.32: Status: ACTIVE | Noted: 2020-10-28

## 2020-10-28 PROBLEM — M54.10 RADICULAR LOW BACK PAIN: Status: ACTIVE | Noted: 2020-10-28

## 2020-10-28 PROCEDURE — G8427 DOCREV CUR MEDS BY ELIG CLIN: HCPCS | Performed by: ORTHOPAEDIC SURGERY

## 2020-10-28 PROCEDURE — 99213 OFFICE O/P EST LOW 20 MIN: CPT | Performed by: ORTHOPAEDIC SURGERY

## 2020-10-28 PROCEDURE — 4004F PT TOBACCO SCREEN RCVD TLK: CPT | Performed by: ORTHOPAEDIC SURGERY

## 2020-10-28 PROCEDURE — G8484 FLU IMMUNIZE NO ADMIN: HCPCS | Performed by: ORTHOPAEDIC SURGERY

## 2020-10-28 PROCEDURE — G8417 CALC BMI ABV UP PARAM F/U: HCPCS | Performed by: ORTHOPAEDIC SURGERY

## 2020-10-28 NOTE — PROGRESS NOTES
Silver Do  [id number]  Encounter Date: 10/28/2020  YOB: 1985    Chief Complaint   Patient presents with    Follow-up     CT results, Right leg. History:Ms. Silver Do is here in follow up regarding her right knee pain and CTA and lower extremity venous doppler results. She sees Dr. Asif Ortiz for chronic joint pain, LBP and fibromyalgia. She still gets up painful band near the top of her knee across her quadricep. She is ambulating with a cane. She had put off her vascular studies and follow-up due to the COVID-19 pandemic after her last visit in February. She recently underwent CT angiogram as well as Doppler studies. No new falls or direct trauma. Pain is continuously interfering with her quality of life. Exam:  Temp 97.9 °F (36.6 °C) (Infrared)   Ht 4' 11\" (1.499 m)   Wt 235 lb (106.6 kg)   BMI 47.46 kg/m²   General: Alert and oriented x3, No acute distress, Cooperative and conversant. Obesity Present  Mood and affect are appropriate. Gait shows step to gait pattern using a single prong cane to assist.  She can perform seated knee extension with radiating pain down her thigh. Right knee shows no effusion and functional range of motion from a seated position. Skin: no erythema, lesions or rashes      CTA, right knee, obtained on 10/22/2020 was reviewed:  Impression    Suprapatellar joint effusion with enhancement along the periphery consistent    with synovitis.         Tiny amount of contrast is seen within the right deep femoral vein on the    arterial images which may represent a very tiny arteriovenous malformation or    early venous filling.  Otherwise unremarkable CTA right lower extremity.                  Right lower extremity DVT, obtained 10/22/2020 was also reviewed:  Summary          No evidence of deep vein or superficial thrombosis involving the right lower     extremity and the contralateral proximal common femoral vein. Assessment:   Diagnosis Orders   1. Radicular low back pain  MRI LUMBAR SPINE WO CONTRAST       Orders  Orders Placed This Encounter   Procedures    MRI LUMBAR SPINE WO CONTRAST       Plan:  We discussed treatment options today. Based on her imaging results, I informed her it does show a slight arteriovenous malformation, which I am not fully sold on that being what is causing her pain. Based on her current location and description of pain I suspect that this may still be radiating from her lumbar spine even though previous x-rays were negative. She stated her pain is along the anterior and lateral aspect of the knee and in her back. I advised her I think she may need a back specialist to further investigate after an MRI is completed. I recommended she have an MRI and if any anatomic abnormalities matches up with her symptoms, we would refer her to one of our back specialists. Otherwise, she may need to see a neurologist or pain specialist.    She understands and accepts this course of care. By signing my name below, Saji Davenport, attest that this documentation has been prepared under the direction and in the presence of Alfie Doan MD.   Electronically Signed: Ryland Aragon, 10/28/20, 11:45 AM EDT. Xavier Garcia MD, personally performed the services described in this documentation. All medical record entries made by the scribe were at my direction and in my presence. I have reviewed the chart and discharge instructions (if applicable) and agree that the record reflects my personal performance and is accurate and complete. Alfie Doan MD, 11/2/20, 8:48 PM EST. Documentation was done using voice recognition dragon software. Every effort was made to ensure accuracy; however, inadvertent  Unintentional computerized transcription errors may be present.

## 2020-11-20 ENCOUNTER — TELEPHONE (OUTPATIENT)
Dept: ORTHOPEDIC SURGERY | Age: 35
End: 2020-11-20

## 2020-12-18 ENCOUNTER — TELEPHONE (OUTPATIENT)
Dept: ORTHOPEDIC SURGERY | Age: 35
End: 2020-12-18

## 2020-12-18 NOTE — TELEPHONE ENCOUNTER
General Question     Subject: She wants to know why her MRI was cancelled    Patient and /or Facility Request: patient    Contact Number: [phone]

## 2020-12-18 NOTE — TELEPHONE ENCOUNTER
Called and spoke w/pt informed MRI was canceled due to denial per insurance. Scheduled pt for f/u visit to obtain more information about her lower back pain.

## 2020-12-31 ENCOUNTER — VIRTUAL VISIT (OUTPATIENT)
Dept: INTERNAL MEDICINE CLINIC | Age: 35
End: 2020-12-31
Payer: COMMERCIAL

## 2020-12-31 PROCEDURE — 99214 OFFICE O/P EST MOD 30 MIN: CPT | Performed by: INTERNAL MEDICINE

## 2020-12-31 PROCEDURE — G8427 DOCREV CUR MEDS BY ELIG CLIN: HCPCS | Performed by: INTERNAL MEDICINE

## 2020-12-31 RX ORDER — LANOLIN ALCOHOL/MO/W.PET/CERES
3 CREAM (GRAM) TOPICAL DAILY
Qty: 90 TABLET | Refills: 3 | Status: SHIPPED | OUTPATIENT
Start: 2020-12-31 | End: 2021-09-16 | Stop reason: SDUPTHER

## 2020-12-31 RX ORDER — ATORVASTATIN CALCIUM 20 MG/1
20 TABLET, FILM COATED ORAL DAILY
Qty: 30 TABLET | Refills: 3 | Status: SHIPPED | OUTPATIENT
Start: 2020-12-31 | End: 2021-04-20 | Stop reason: SDUPTHER

## 2020-12-31 RX ORDER — HYDROXYZINE HYDROCHLORIDE 25 MG/1
25 TABLET, FILM COATED ORAL EVERY 8 HOURS PRN
Qty: 40 TABLET | Refills: 1 | Status: SHIPPED | OUTPATIENT
Start: 2020-12-31 | End: 2021-03-17 | Stop reason: SDUPTHER

## 2020-12-31 NOTE — PROGRESS NOTES
2020    TELEHEALTH EVALUATION -- Audio/Visual (During RKHZI-11 public health emergency)    HPI:    Silver Do (:  1985) has requested an audio/video evaluation for the following concern(s):    Follow-up visit. Patient feels her depression is better with current Prozac. Denies any suicidal homicidal ideation or any manic symptoms. She is requesting refill for her insomnia medication including melatonin. Patient denies excessive drowsiness while taking this medicine-hydroxyzine, fluoxetine and melatonin. History of reactive airway disease and bronchial asthma. Patient is taking Advair every day. She takes albuterol couple of times every 2 to 3 weeks. She denies any night symptoms. Hyperlipidemia:     Patient returns for follow up of hyperlipidemia. Patient has been taking Her medications as prescribed. Patient's lipids are controlled. Denies myalgias or any GI upset. Side effects related to taking the medications include none. Lab Results   Component Value Date    TRIG 157 10/22/2020    HDL 40 10/22/2020    LDLCALC 83 10/22/2020     Lab Results   Component Value Date    ALT 29 01/15/2020    AST 23 01/15/2020                        Review of Systems   Constitutional: Negative for activity change and diaphoresis. HENT: Negative for trouble swallowing and voice change. Respiratory: Negative for shortness of breath and wheezing. Cardiovascular: Negative for chest pain and palpitations. Genitourinary: Negative for difficulty urinating and frequency. Psychiatric/Behavioral: Negative for decreased concentration, dysphoric mood and hallucinations. Prior to Visit Medications    Medication Sig Taking?  Authorizing Provider   atorvastatin (LIPITOR) 20 MG tablet Take 1 tablet by mouth daily Yes Joellen Marquez MD   hydrOXYzine (ATARAX) 25 MG tablet Take 1 tablet by mouth every 8 hours as needed for Anxiety Yes Joellen Marquez MD melatonin 3 MG TABS tablet Take 1 tablet by mouth daily Yes Sharyn Mitchell MD   FLUoxetine (PROZAC) 20 MG capsule Take 1 capsule by mouth daily Yes Sharyn Mitchell MD   naproxen (NAPROSYN) 500 MG tablet Take 1 tablet by mouth 2 times daily as needed for Pain Yes Sharyn Mitchell MD   nystatin (MYCOSTATIN) 652386 UNIT/GM powder Apply topically 4 times daily Apply topically 4 times daily. Yes Sharyn Mitchell MD   tiZANidine (ZANAFLEX) 4 MG tablet TAKE 1 TABLET BY MOUTH THREE TIMES DAILY Yes Sharyn Mitchell MD   fluticasone-salmeterol (ADVAIR) 250-50 MCG/DOSE AEPB Inhale 1 puff into the lungs every 12 hours Yes Sharyn Mitchell MD   Cholecalciferol (VITAMIN D3) 1.25 MG (56669 UT) CAPS Take 50,000 capsules by mouth once a week Yes Sharyn Mitchell MD   albuterol sulfate HFA (VENTOLIN HFA) 108 (90 Base) MCG/ACT inhaler Inhale 2 puffs into the lungs every 6 hours as needed for Wheezing Yes Sharyn Mitchell MD   doxycycline monohydrate (MONODOX) 100 MG capsule Take one capsule by mouth twice a day with food Yes Historical Provider, MD   BENZOYL PEROXIDE 5 % external wash Apply 142 g topically daily Yes Historical Provider, MD   adalimumab (HUMIRA PEN-PS/UV/ADOL HS START) 40 MG/0.8ML injection Inject 80 mg into the skin once for 1 dose Inject 80 mg SC x 3 doses on days 1, 2, 15, then 40 mg SC qwk on day 29. Yes Samm Brown MD   gabapentin (NEURONTIN) 300 MG capsule Take 1 capsule by mouth 3 times daily for 90 days. Yes Samm Brown MD   Levonorgestrel (MIRENA IU) by Intrauterine route.  Yes Historical Provider, MD   clindamycin (CLINDAGEL) 1 % gel Apply topically to affected areas twice a day  Historical Provider, MD   lidocaine (LMX) 4 % cream APPLY TO THE AFFECTED AREA 1 TO 4 TIMES A DAY PRN  Historical Provider, MD       Social History     Tobacco Use    Smoking status: Current Every Day Smoker     Packs/day: 0.20     Types: Cigarettes, E-Cigarettes     Start date: 1/1/1998  Smokeless tobacco: Never Used    Tobacco comment: vaping    Substance Use Topics    Alcohol use: Yes     Frequency: Monthly or less     Drinks per session: 1 or 2    Drug use: No        Allergies   Allergen Reactions    Chantix [Varenicline]      Suicidal thoughts    Seroquel [Quetiapine Fumarate]      Bone pain   ,   Past Medical History:   Diagnosis Date    Asthma     Depression     HTN (hypertension)     Thyroid disease    ,   Past Surgical History:   Procedure Laterality Date     SECTION  2008    OTHER SURGICAL HISTORY Right 2018    EXCISION OF SEBACEOUS CYST OF BACK UPPER NECK   ,   Social History     Tobacco Use    Smoking status: Current Every Day Smoker     Packs/day: 0.20     Types: Cigarettes, E-Cigarettes     Start date: 1998    Smokeless tobacco: Never Used    Tobacco comment: vaping    Substance Use Topics    Alcohol use: Yes     Frequency: Monthly or less     Drinks per session: 1 or 2    Drug use: No   ,   Family History   Adopted: Yes   Family history unknown: Yes       PHYSICAL EXAMINATION:  [ INSTRUCTIONS:  \"[x]\" Indicates a positive item  \"[]\" Indicates a negative item  -- DELETE ALL ITEMS NOT EXAMINED]  Vital Signs: (As obtained by patient/caregiver or practitioner observation)    Patient did not check her blood pressure at home or any other vitals. Constitutional: [x] Appears well-developed and well-nourished [] No apparent distress      [] Abnormal-   Mental status  [x] Alert and awake  [x] Oriented to person/place/time [x]Able to follow commands      Eyes:  EOM    []  Normal  [] Abnormal-  Sclera  []  Normal  [] Abnormal -         Discharge []  None visible  [] Abnormal -    HENT:   [] Normocephalic, atraumatic.   [] Abnormal   [] Mouth/Throat: Mucous membranes are moist.     External Ears [] Normal  [] Abnormal-     Neck: [] No visualized mass Pulmonary/Chest: [x] Respiratory effort normal.  [x] No visualized signs of difficulty breathing or respiratory distress        [] Abnormal-      Musculoskeletal:   [] Normal gait with no signs of ataxia         [] Normal range of motion of neck        [] Abnormal-       Neurological:        [x] No Facial Asymmetry (Cranial nerve 7 motor function) (limited exam to video visit)          [] No gaze palsy        [] Abnormal-         Skin:        [] No significant exanthematous lesions or discoloration noted on facial skin         [] Abnormal-            Psychiatric:       [] Normal Affect [] No Hallucinations        [] Abnormal-     Other pertinent observable physical exam findings-     ASSESSMENT/PLAN:  1. Depression, unspecified depression type  She will continue current Prozac. No medicine side effect    2. Hyperlipidemia, unspecified hyperlipidemia type  Patient has been taking medication regularly. Diet lifestyle changes weight loss. - atorvastatin (LIPITOR) 20 MG tablet; Take 1 tablet by mouth daily  Dispense: 30 tablet; Refill: 3    3. Primary insomnia  She will continue  - hydrOXYzine (ATARAX) 25 MG tablet; Take 1 tablet by mouth every 8 hours as needed for Anxiety  Dispense: 40 tablet; Refill: 1  - melatonin 3 MG TABS tablet; Take 1 tablet by mouth daily  Dispense: 90 tablet; Refill: 3    4. Moderate persistent asthma without complication  Asthma symptom has been well controlled. Continue Advair and as needed albuterol. Patient is again advised to schedule CT abdomen pelvis for follow-up of adrenal nodule    Return in about 3 months (around 3/31/2021) for office visit in person. Lynnette Hernandez is a 28 y.o. female being evaluated by a Virtual Visit (video visit) encounter to address concerns as mentioned above. A caregiver was present when appropriate. Due to this being a TeleHealth encounter (During XTQWB-22 public health emergency), evaluation of the following organ systems was limited: Vitals/Constitutional/EENT/Resp/CV/GI//MS/Neuro/Skin/Heme-Lymph-Imm. Pursuant to the emergency declaration under the [address] [place] [address] and the [employer] Act, this Virtual Visit was conducted with patient's (and/or legal guardian's) consent, to reduce the patient's risk of exposure to COVID-19 and provide necessary medical care. The patient (and/or legal guardian) has also been advised to contact this office for worsening conditions or problems, and seek emergency medical treatment and/or call 911 if deemed necessary. Patient identification was verified at the start of the visit: Yes    Total time spent on this encounter: Not billed by time    Services were provided through a video synchronous discussion virtually to substitute for in-person clinic visit. Patient and provider were located at their individual homes. --Olga Chandra MD on 12/31/2020 at 12:18 PM    An electronic signature was used to authenticate this note.

## 2021-01-06 ENCOUNTER — OFFICE VISIT (OUTPATIENT)
Dept: ORTHOPEDIC SURGERY | Age: 36
End: 2021-01-06
Payer: COMMERCIAL

## 2021-01-06 VITALS — HEIGHT: 59 IN | BODY MASS INDEX: 47.37 KG/M2 | WEIGHT: 235 LBS | TEMPERATURE: 97.9 F

## 2021-01-06 DIAGNOSIS — M54.16 LUMBAR RADICULAR PAIN: Primary | ICD-10-CM

## 2021-01-06 PROCEDURE — G8417 CALC BMI ABV UP PARAM F/U: HCPCS | Performed by: ORTHOPAEDIC SURGERY

## 2021-01-06 PROCEDURE — 99213 OFFICE O/P EST LOW 20 MIN: CPT | Performed by: ORTHOPAEDIC SURGERY

## 2021-01-06 PROCEDURE — G8427 DOCREV CUR MEDS BY ELIG CLIN: HCPCS | Performed by: ORTHOPAEDIC SURGERY

## 2021-01-06 PROCEDURE — G8484 FLU IMMUNIZE NO ADMIN: HCPCS | Performed by: ORTHOPAEDIC SURGERY

## 2021-01-06 PROCEDURE — 4004F PT TOBACCO SCREEN RCVD TLK: CPT | Performed by: ORTHOPAEDIC SURGERY

## 2021-01-06 NOTE — PROGRESS NOTES
Juan Alberto De Paz MD  [address], [address] [place] [address] Drive  [address]  [address] [address]    Betzy Ballesteros  [id number]  Encounter Date: 1/6/2021  YOB: 1985    Chief Complaint   Patient presents with    Follow-up     TR MRI denial       History:Ms. Betzy Ballesteros is here in follow up regarding her lower back pain. Since her last visit, we tried to obtain a MRI of her lumbar spine, but it was denied by her insurance due to needing further information. She feels that all her sports injuries when she was younger, were on her right side, which is where she is currently experiencing the majority of her pain. She was also informed that she may have had a mild case of scoliosis. She states that when she was younger, there was a little needle that went into her buttock and helped relieve that pain going down her right leg. She is curious about using that again. The current cold weather \"kills\" her joints but feels great on her skin. She continues to try and stretch every morning in 10 minute increments as she can't sweat with HS. Exam:  Temp 97.9 °F (36.6 °C)   Ht 4' 11\" (1.499 m)   Wt 235 lb (106.6 kg)   BMI 47.46 kg/m²   General: Alert and oriented x3, No acute distress, Cooperative and conversant. Obesity Present  Mood and affect are appropriate. Lumbar/Sacral Spine Examination:    Inspection:  No scars, no deformity     Palpation:  Moderate tenderness along thoracolumbar spine on the right. Moderate tenderness into the SI region on the right    Range of Motion:  Decreased flexion and lateral bending with discomfort radiating down the right    Strength:  4/5 thought both lower extremities     Special Tests:  SLR - right moderate discomfort radiating down from back    Skin: There are no rashes, ulcerations or lesions. Gait: Decreased stride length and weight shift, favoring her right side. Adaptive device: single prong cane. Radiology:     X-rays obtained today and reviewed in office:  Views 2: lumbar spine   Impression: No evidence for acute fracture, subluxation, or dislocation. Degenerative changes with in the facet joints L3-S1       Assessment:   Diagnosis Orders   1. Lumbar radicular pain  XR LUMBAR SPINE (2-3 VIEWS)       Orders  Orders Placed This Encounter   Procedures    XR LUMBAR SPINE (2-3 VIEWS)       Plan:  I have discussed treatment options with the patient. After reviewing her x-rays, it does show some deterioration of the facet joints bilaterally. Which can attribute to her sciatica pain. She will need to proceed with an injection in her back to help relieve her pain. However she will need to obtain the MRI prior to fully see the amount of deterioration in her back as well as the levels. We will reorder the MRI today. She understands and accepts this course of care. By signing my name below, Claudia Up, attest that this documentation has been prepared under the direction and in the presence of Betina Holt MD.   Electronically Signed: Todd Aschoff, Scribe, 1/6/21, 4:09 PM EST. Angie Courtney MD, personally performed the services described in this documentation. All medical record entries made by the scribe were at my direction and in my presence. I have reviewed the chart and discharge instructions (if applicable) and agree that the record reflects my personal performance and is accurate and complete. Betina Holt MD, 1/27/21, 12:48 PM EST. Documentation was done using voice recognition dragon software. Every effort was made to ensure accuracy; however, inadvertent  Unintentional computerized transcription errors may be present.

## 2021-01-09 ENCOUNTER — HOSPITAL ENCOUNTER (OUTPATIENT)
Dept: MRI IMAGING | Age: 36
Discharge: HOME | End: 2021-01-09
Payer: COMMERCIAL

## 2021-01-09 DIAGNOSIS — M54.16 LUMBAR RADICULAR PAIN: ICD-10-CM

## 2021-01-09 PROCEDURE — 72148 MRI LUMBAR SPINE W/O DYE: CPT

## 2021-01-12 DIAGNOSIS — M79.7 FIBROMYALGIA: ICD-10-CM

## 2021-01-13 RX ORDER — GABAPENTIN 300 MG/1
CAPSULE ORAL
Qty: 90 CAPSULE | Refills: 0 | OUTPATIENT
Start: 2021-01-13

## 2021-01-15 ENCOUNTER — VIRTUAL VISIT (OUTPATIENT)
Dept: ORTHOPEDIC SURGERY | Age: 36
End: 2021-01-15
Payer: COMMERCIAL

## 2021-01-15 DIAGNOSIS — M54.10 RADICULAR LOW BACK PAIN: Primary | ICD-10-CM

## 2021-01-15 PROCEDURE — G8484 FLU IMMUNIZE NO ADMIN: HCPCS | Performed by: ORTHOPAEDIC SURGERY

## 2021-01-15 PROCEDURE — 4004F PT TOBACCO SCREEN RCVD TLK: CPT | Performed by: ORTHOPAEDIC SURGERY

## 2021-01-15 PROCEDURE — 99212 OFFICE O/P EST SF 10 MIN: CPT | Performed by: ORTHOPAEDIC SURGERY

## 2021-01-15 PROCEDURE — G8417 CALC BMI ABV UP PARAM F/U: HCPCS | Performed by: ORTHOPAEDIC SURGERY

## 2021-01-15 PROCEDURE — G8427 DOCREV CUR MEDS BY ELIG CLIN: HCPCS | Performed by: ORTHOPAEDIC SURGERY

## 2021-01-15 NOTE — PROGRESS NOTES
1/15/2021    TELEHEALTH EVALUATION -- Audio/Visual (During [facility]-59 public health emergency)    HPI:    Ten Baldwin (:  1985) has requested an audio/video evaluation for the following concern(s):    Visit to review the findings of her MRI of her lumbar spine. Symptoms have remained unchanged since her last visit . Prior to Visit Medications    Medication Sig Taking? Authorizing Provider   atorvastatin (LIPITOR) 20 MG tablet Take 1 tablet by mouth daily  Ba Sheehan MD   hydrOXYzine (ATARAX) 25 MG tablet Take 1 tablet by mouth every 8 hours as needed for Anxiety  Ba Sheehan MD   melatonin 3 MG TABS tablet Take 1 tablet by mouth daily  Ba Sheehan MD   FLUoxetine (PROZAC) 20 MG capsule Take 1 capsule by mouth daily  Ba Sheehan MD   naproxen (NAPROSYN) 500 MG tablet Take 1 tablet by mouth 2 times daily as needed for Pain  Ba Sheehan MD   nystatin (MYCOSTATIN) 989309 UNIT/GM powder Apply topically 4 times daily Apply topically 4 times daily.   Ba Sheehan MD   tiZANidine (ZANAFLEX) 4 MG tablet TAKE 1 TABLET BY MOUTH THREE TIMES DAILY  SANCHO Ramos MD   fluticasone-salmeterol (ADVAIR) 250-50 MCG/DOSE AEPB Inhale 1 puff into the lungs every 12 hours  Ba Sheehan MD   Cholecalciferol (VITAMIN D3) 1.25 MG (41169 UT) CAPS Take 50,000 capsules by mouth once a week  Ba Sheehan MD   albuterol sulfate HFA (VENTOLIN HFA) 108 (90 Base) MCG/ACT inhaler Inhale 2 puffs into the lungs every 6 hours as needed for Wheezing  SANCHO Ramos MD   doxycycline monohydrate (MONODOX) 100 MG capsule Take one capsule by mouth twice a day with food  Historical Provider, MD   clindamycin (CLINDAGEL) 1 % gel Apply topically to affected areas twice a day  Historical Provider, MD   BENZOYL PEROXIDE 5 % external wash Apply 142 g topically daily  Historical Provider, MD adalimumab (HUMIRA PEN-PS/UV/ADOL HS START) 40 MG/0.8ML injection Inject 80 mg into the skin once for 1 dose Inject 80 mg SC x 3 doses on days 1, 2, 15, then 40 mg SC qwk on day 29. Renetta Marc MD   gabapentin (NEURONTIN) 300 MG capsule Take 1 capsule by mouth 3 times daily for 90 days. Renetta Marc MD   lidocaine (LMX) 4 % cream APPLY TO THE AFFECTED AREA 1 TO 4 TIMES A DAY PRN  Historical Provider, MD   Levonorgestrel (MIRENA IU) by Intrauterine route. Historical Provider, MD       Social History     Tobacco Use    Smoking status: Current Every Day Smoker     Packs/day: 0.20     Types: Cigarettes, E-Cigarettes     Start date: 1/1/1998    Smokeless tobacco: Never Used    Tobacco comment: vaping    Substance Use Topics    Alcohol use: Yes     Frequency: Monthly or less     Drinks per session: 1 or 2    Drug use: No            PHYSICAL EXAMINATION:  Constitutional: [x] Appears well-developed and well-nourished [x] No apparent distress      [] Abnormal-   Mental status  [x] Alert and awake  [x] Oriented to person/place/time [x]Able to follow commands      Eyes:  EOM    [x]  Normal  [] Abnormal-  Sclera  [x]  Normal  [] Abnormal -         Discharge [x]  None visible  [] Abnormal -    HENT:   [x] Normocephalic, atraumatic.   [] Abnormal   [] Mouth/Throat: Mucous membranes are moist.     External Ears [] Normal  [] Abnormal-     Neck: [x] No visualized mass     Pulmonary/Chest: [x] Respiratory effort normal.  [x] No visualized signs of difficulty breathing or respiratory distress        [] Abnormal-      Neurological:        [x] No Facial Asymmetry (Cranial nerve 7 motor function) (limited exam to video visit)          [] No gaze palsy        [] Abnormal-         Skin:        [x] No significant exanthematous lesions or discoloration noted on facial skin         [] Abnormal-            Psychiatric:       [x] Normal Affect [x] No Hallucinations        [] Abnormal- Services were provided through a video synchronous discussion virtually to substitute for in-person clinic visit. Patient and provider were located at their individual homes. By signing my name below, Deepa Birdie, attest that this documentation has been prepared under the direction and in the presence of Caitlin Causey MD.   Electronically Signed: Ryland Canela, 1/15/21, 8:12 AM EST. Cairn Davila MD, personally performed the services described in this documentation. All medical record entries made by the kimmieibe were at my direction and in my presence. I have reviewed the chart and discharge instructions (if applicable) and agree that the record reflects my personal performance and is accurate and complete. Caitlin Causey MD, 1/24/21, 8:27 PM EST. An electronic signature was used to authenticate this note.

## 2021-01-27 ENCOUNTER — TELEPHONE (OUTPATIENT)
Dept: ORTHOPEDIC SURGERY | Age: 36
End: 2021-01-27

## 2021-01-27 DIAGNOSIS — M54.10 RADICULAR LOW BACK PAIN: Primary | ICD-10-CM

## 2021-02-03 ENCOUNTER — TELEPHONE (OUTPATIENT)
Dept: ORTHOPEDIC SURGERY | Age: 36
End: 2021-02-03

## 2021-02-03 DIAGNOSIS — M54.10 RADICULAR LOW BACK PAIN: Primary | ICD-10-CM

## 2021-02-03 NOTE — TELEPHONE ENCOUNTER
I spoke to pt. She does not care for how she was treated at Dr Chino Coles office. A new referral has been sent to Dr Martha France.

## 2021-03-11 ENCOUNTER — TELEPHONE (OUTPATIENT)
Dept: INTERNAL MEDICINE CLINIC | Age: 36
End: 2021-03-11

## 2021-03-11 NOTE — TELEPHONE ENCOUNTER
Last fasting done in October. Will come fasting if able--will be ordered at time of visit.  Pt aware

## 2021-03-17 ENCOUNTER — VIRTUAL VISIT (OUTPATIENT)
Dept: INTERNAL MEDICINE CLINIC | Age: 36
End: 2021-03-17
Payer: COMMERCIAL

## 2021-03-17 DIAGNOSIS — M54.41 CHRONIC MIDLINE LOW BACK PAIN WITH BILATERAL SCIATICA: ICD-10-CM

## 2021-03-17 DIAGNOSIS — M54.9 CHRONIC BACK PAIN GREATER THAN 3 MONTHS DURATION: ICD-10-CM

## 2021-03-17 DIAGNOSIS — G89.29 CHRONIC BACK PAIN GREATER THAN 3 MONTHS DURATION: ICD-10-CM

## 2021-03-17 DIAGNOSIS — F51.01 PRIMARY INSOMNIA: ICD-10-CM

## 2021-03-17 DIAGNOSIS — G89.29 CHRONIC MIDLINE LOW BACK PAIN WITH BILATERAL SCIATICA: ICD-10-CM

## 2021-03-17 DIAGNOSIS — E78.5 HYPERLIPIDEMIA, UNSPECIFIED HYPERLIPIDEMIA TYPE: Primary | ICD-10-CM

## 2021-03-17 DIAGNOSIS — F32.A DEPRESSION, UNSPECIFIED DEPRESSION TYPE: ICD-10-CM

## 2021-03-17 DIAGNOSIS — M54.42 CHRONIC MIDLINE LOW BACK PAIN WITH BILATERAL SCIATICA: ICD-10-CM

## 2021-03-17 DIAGNOSIS — J45.40 MODERATE PERSISTENT ASTHMA WITHOUT COMPLICATION: ICD-10-CM

## 2021-03-17 DIAGNOSIS — M25.50 ARTHRALGIA OF MULTIPLE JOINTS: ICD-10-CM

## 2021-03-17 PROCEDURE — G8427 DOCREV CUR MEDS BY ELIG CLIN: HCPCS | Performed by: INTERNAL MEDICINE

## 2021-03-17 PROCEDURE — 99213 OFFICE O/P EST LOW 20 MIN: CPT | Performed by: INTERNAL MEDICINE

## 2021-03-17 RX ORDER — TIZANIDINE 4 MG/1
TABLET ORAL
Qty: 90 TABLET | Refills: 5 | Status: SHIPPED | OUTPATIENT
Start: 2021-03-17 | End: 2021-11-29

## 2021-03-17 RX ORDER — ALBUTEROL SULFATE 90 UG/1
2 AEROSOL, METERED RESPIRATORY (INHALATION) EVERY 6 HOURS PRN
Qty: 1 INHALER | Refills: 5 | Status: SHIPPED | OUTPATIENT
Start: 2021-03-17 | End: 2021-11-19

## 2021-03-17 RX ORDER — NAPROXEN 500 MG/1
500 TABLET ORAL 2 TIMES DAILY PRN
Qty: 180 TABLET | Refills: 1 | Status: SHIPPED | OUTPATIENT
Start: 2021-03-17 | End: 2022-07-07 | Stop reason: SDUPTHER

## 2021-03-17 RX ORDER — FLUOXETINE HYDROCHLORIDE 20 MG/1
20 CAPSULE ORAL DAILY
Qty: 30 CAPSULE | Refills: 5 | Status: SHIPPED | OUTPATIENT
Start: 2021-03-17 | End: 2021-11-19

## 2021-03-17 RX ORDER — HYDROXYZINE HYDROCHLORIDE 25 MG/1
25 TABLET, FILM COATED ORAL EVERY 8 HOURS PRN
Qty: 40 TABLET | Refills: 1 | Status: SHIPPED | OUTPATIENT
Start: 2021-03-17 | End: 2021-08-24

## 2021-03-17 NOTE — PROGRESS NOTES
3/17/2021    TELEHEALTH EVALUATION -- Audio/Visual (During [facility]-76 public health emergency)    HPI:    Ten Baldwin (:  1985) has requested an audio/video evaluation for the following concern(s):    During the last visit I advised patient to do it in office visit. There are multiple occasion communication breaks up. She reports she is disabled now because of hidradenitis suppurativa. He is requesting refill of multiple medications. History of reactive airway disease. She is using maintenance inhaler regularly. She also need albuterol couple of times a day. Review of Systems    Prior to Visit Medications    Medication Sig Taking? Authorizing Provider   albuterol sulfate HFA (VENTOLIN HFA) 108 (90 Base) MCG/ACT inhaler Inhale 2 puffs into the lungs every 6 hours as needed for Wheezing Yes Ba Sheehan MD   fluticasone-salmeterol (ADVAIR) 250-50 MCG/DOSE AEPB Inhale 1 puff into the lungs every 12 hours Yes Ba Sheehan MD   FLUoxetine (PROZAC) 20 MG capsule Take 1 capsule by mouth daily Yes Ba Sheehan MD   tiZANidine (ZANAFLEX) 4 MG tablet TAKE 1 TABLET BY MOUTH THREE TIMES DAILY Yes Ba Sheehan MD   naproxen (NAPROSYN) 500 MG tablet Take 1 tablet by mouth 2 times daily as needed for Pain Yes Ba Sheehan MD   hydrOXYzine (ATARAX) 25 MG tablet Take 1 tablet by mouth every 8 hours as needed for Anxiety Yes Ba Sheehan MD   atorvastatin (LIPITOR) 20 MG tablet Take 1 tablet by mouth daily  Ba Sheehan MD   melatonin 3 MG TABS tablet Take 1 tablet by mouth daily  Ba Sheehan MD   nystatin (MYCOSTATIN) 712119 UNIT/GM powder Apply topically 4 times daily Apply topically 4 times daily.   Ba Sheehan MD   Cholecalciferol (VITAMIN D3) 1.25 MG (09573 UT) CAPS Take 50,000 capsules by mouth once a week  Ba Sheehan MD   doxycycline monohydrate (MONODOX) 100 MG capsule Take one capsule by mouth twice a day with food  Historical Provider, MD   clindamycin (CLINDAGEL) 1 % gel Apply topically to affected areas twice a day  Historical Provider, MD   BENZOYL PEROXIDE 5 % external wash Apply 142 g topically daily  Historical Provider, MD   adalimumab (HUMIRA PEN-PS/UV/ADOL HS START) 40 MG/0.8ML injection Inject 80 mg into the skin once for 1 dose Inject 80 mg SC x 3 doses on days 1, 2, 15, then 40 mg SC qwk on day 29. Levon Arce MD   gabapentin (NEURONTIN) 300 MG capsule Take 1 capsule by mouth 3 times daily for 90 days. Levon Arce MD   lidocaine (LMX) 4 % cream APPLY TO THE AFFECTED AREA 1 TO 4 TIMES A DAY PRN  Historical Provider, MD   Levonorgestrel (MIRENA IU) by Intrauterine route.   Historical Provider, MD       Social History     Tobacco Use    Smoking status: Current Every Day Smoker     Packs/day: 0.20     Types: Cigarettes, E-Cigarettes     Start date: 1998    Smokeless tobacco: Never Used    Tobacco comment: vaping    Substance Use Topics    Alcohol use: Yes     Frequency: Monthly or less     Drinks per session: 1 or 2    Drug use: No        Allergies   Allergen Reactions    Chantix [Varenicline]      Suicidal thoughts    Seroquel [Quetiapine Fumarate]      Bone pain   ,   Past Medical History:   Diagnosis Date    Asthma     Depression     HS [facility]tosis[facility] ([facility] [address]     HTN (hypertension)     Thyroid disease    ,   Past Surgical History:   Procedure Laterality Date     SECTION      OTHER SURGICAL HISTORY Right 2018    EXCISION OF SEBACEOUS CYST OF BACK UPPER NECK   ,   Social History     Tobacco Use    Smoking status: Current Every Day Smoker     Packs/day: 0.20     Types: Cigarettes, E-Cigarettes     Start date: 1998    Smokeless tobacco: Never Used    Tobacco comment: vaping    Substance Use Topics    Alcohol use: Yes     Frequency: Monthly or less     Drinks per session: 1 or 2    Drug use: No   ,   Family History   Adopted: Yes   Family history unknown: Yes       PHYSICAL EXAMINATION:  [ INSTRUCTIONS: \"[x]\" Indicates a positive item  \"[]\" Indicates a negative item  -- DELETE ALL ITEMS NOT EXAMINED]  Vital Signs: (As obtained by patient/caregiver or practitioner observation)        Constitutional: [] Appears well-developed and well-nourished [x] No apparent distress      [] Abnormal-   Mental status  [x] Alert and awake  [x] Oriented to person/place/time []Able to follow commands      Eyes:  EOM    []  Normal  [] Abnormal-  Sclera  []  Normal  [] Abnormal -         Discharge []  None visible  [] Abnormal -    HENT:   [] Normocephalic, atraumatic. [] Abnormal   [] Mouth/Throat: Mucous membranes are moist.     External Ears [] Normal  [] Abnormal-     Neck: [] No visualized mass     Pulmonary/Chest: [x] Respiratory effort normal.  [x] No visualized signs of difficulty breathing or respiratory distress        [] Abnormal-      Musculoskeletal:   [] Normal gait with no signs of ataxia         [] Normal range of motion of neck        [] Abnormal-       Neurological:        [x] No Facial Asymmetry (Cranial nerve 7 motor function) (limited exam to video visit)          [] No gaze palsy        [] Abnormal-         Skin:        [] No significant exanthematous lesions or discoloration noted on facial skin         [] Abnormal-            Psychiatric:       [] Normal Affect [] No Hallucinations        [] Abnormal-     Other pertinent observable physical exam findings-     ASSESSMENT/PLAN:  1. Moderate persistent asthma without complication  refill  - albuterol sulfate HFA (VENTOLIN HFA) 108 (90 Base) MCG/ACT inhaler; Inhale 2 puffs into the lungs every 6 hours as needed for Wheezing  Dispense: 1 Inhaler; Refill: 5  - fluticasone-salmeterol (ADVAIR) 250-50 MCG/DOSE AEPB; Inhale 1 puff into the lungs every 12 hours  Dispense: 60 each; Refill: 5    2. Depression, unspecified depression type  refill  - FLUoxetine (PROZAC) 20 MG capsule; Take 1 capsule by mouth daily  Dispense: 30 capsule; Refill: 5    3.  Chronic midline low back pain with bilateral sciatica  refill  - tiZANidine (ZANAFLEX) 4 MG tablet; TAKE 1 TABLET BY MOUTH THREE TIMES DAILY  Dispense: 90 tablet; Refill: 5    4. Arthralgia of multiple joints  As needed  - naproxen (NAPROSYN) 500 MG tablet; Take 1 tablet by mouth 2 times daily as needed for Pain  Dispense: 180 tablet; Refill: 1     6. Primary insomnia  continue  - hydrOXYzine (ATARAX) 25 MG tablet; Take 1 tablet by mouth every 8 hours as needed for Anxiety  Dispense: 40 tablet; Refill: 1    History of hyperlipidemia. Patient taking Lipitor regularly. Will obtain lab work during the next visit. OV in a week. Derrick Martinez, was evaluated through a synchronous (real-time) audio-video encounter. The patient (or guardian if applicable) is aware that this is a billable service. Verbal consent to proceed has been obtained within the past 12 months. The visit was conducted pursuant to the emergency declaration under the [address] [place] [address] and the [employer] General Act. Patient identification was verified, and a caregiver was present when appropriate. The patient was located in a state where the provider was credentialed to provide care. Total time spent on this encounter: Not billed by time    --Waldo Argueta MD on 3/17/2021 at 5:13 PM    An electronic signature was used to authenticate this note.

## 2021-04-16 ENCOUNTER — OFFICE VISIT (OUTPATIENT)
Dept: INTERNAL MEDICINE CLINIC | Age: 36
End: 2021-04-16
Payer: COMMERCIAL

## 2021-04-16 VITALS
BODY MASS INDEX: 51.41 KG/M2 | HEART RATE: 80 BPM | WEIGHT: 255 LBS | DIASTOLIC BLOOD PRESSURE: 88 MMHG | SYSTOLIC BLOOD PRESSURE: 118 MMHG | HEIGHT: 59 IN

## 2021-04-16 DIAGNOSIS — M54.9 CHRONIC BACK PAIN GREATER THAN 3 MONTHS DURATION: Primary | ICD-10-CM

## 2021-04-16 DIAGNOSIS — E27.8 LEFT ADRENAL MASS (HCC): ICD-10-CM

## 2021-04-16 DIAGNOSIS — R19.7 DIARRHEA, UNSPECIFIED TYPE: ICD-10-CM

## 2021-04-16 DIAGNOSIS — Q27.32: ICD-10-CM

## 2021-04-16 DIAGNOSIS — G89.29 CHRONIC BACK PAIN GREATER THAN 3 MONTHS DURATION: Primary | ICD-10-CM

## 2021-04-16 DIAGNOSIS — M35.00 SICCA COMPLEX (HCC): ICD-10-CM

## 2021-04-16 DIAGNOSIS — E78.5 HYPERLIPIDEMIA, UNSPECIFIED HYPERLIPIDEMIA TYPE: ICD-10-CM

## 2021-04-16 PROCEDURE — G8417 CALC BMI ABV UP PARAM F/U: HCPCS | Performed by: INTERNAL MEDICINE

## 2021-04-16 PROCEDURE — 4004F PT TOBACCO SCREEN RCVD TLK: CPT | Performed by: INTERNAL MEDICINE

## 2021-04-16 PROCEDURE — G8427 DOCREV CUR MEDS BY ELIG CLIN: HCPCS | Performed by: INTERNAL MEDICINE

## 2021-04-16 PROCEDURE — 99214 OFFICE O/P EST MOD 30 MIN: CPT | Performed by: INTERNAL MEDICINE

## 2021-04-16 RX ORDER — CHOLECALCIFEROL (VITAMIN D3) 1250 MCG
50000 CAPSULE ORAL WEEKLY
Qty: 12 CAPSULE | Refills: 1 | Status: SHIPPED | OUTPATIENT
Start: 2021-04-16 | End: 2021-07-22 | Stop reason: SDUPTHER

## 2021-04-16 ASSESSMENT — PATIENT HEALTH QUESTIONNAIRE - PHQ9
1. LITTLE INTEREST OR PLEASURE IN DOING THINGS: 1
SUM OF ALL RESPONSES TO PHQ QUESTIONS 1-9: 2
SUM OF ALL RESPONSES TO PHQ9 QUESTIONS 1 & 2: 2
2. FEELING DOWN, DEPRESSED OR HOPELESS: 1
SUM OF ALL RESPONSES TO PHQ QUESTIONS 1-9: 2

## 2021-04-16 ASSESSMENT — ENCOUNTER SYMPTOMS
SHORTNESS OF BREATH: 0
BACK PAIN: 1

## 2021-04-16 NOTE — PROGRESS NOTES
Drinks per session: 1 or 2    Drug use: No    Sexual activity: Yes     Partners: Male     Birth control/protection: I.U.D. Lifestyle    Physical activity     Days per week: None     Minutes per session: None    Stress: None   Relationships    Social connections     Talks on phone: None     Gets together: None     Attends [employer] service: None     Active member of club or organization: None     Attends meetings of clubs or organizations: None     Relationship status: None    Intimate partner violence     Fear of current or ex partner: None     Emotionally abused: None     Physically abused: None     Forced sexual activity: None   Other Topics Concern    None   Social History Narrative    None     Family History   Adopted: Yes   Family history unknown: Yes       Review of Systems   Constitutional: Negative for diaphoresis and unexpected weight change. Respiratory: Negative for shortness of breath. Cardiovascular: Negative for chest pain and palpitations. Genitourinary: Negative for flank pain and frequency. Musculoskeletal: Positive for back pain. Neurological: Negative for dizziness and weakness. OBJECTIVE:  Pulse Readings from Last 4 Encounters:   04/16/21 80   02/26/20 87   02/12/20 96   02/07/20 96     Wt Readings from Last 4 Encounters:   04/16/21 255 lb (115.7 kg)   01/06/21 235 lb (106.6 kg)   10/28/20 235 lb (106.6 kg)   02/26/20 244 lb (110.7 kg)     BP Readings from Last 4 Encounters:   04/16/21 118/88   02/26/20 110/63   02/12/20 102/72   02/07/20 124/88     Physical Exam  Vitals signs and nursing note reviewed. Constitutional:       Appearance: She is obese. Cardiovascular:      Rate and Rhythm: Normal rate and regular rhythm. Pulses: Normal pulses. Heart sounds: Normal heart sounds. Pulmonary:      Effort: Pulmonary effort is normal.      Breath sounds: Normal breath sounds. Abdominal:      General: Bowel sounds are normal.      Tenderness:  There is no abdominal tenderness. There is no guarding or rebound. Neurological:      General: No focal deficit present. Mental Status: She is oriented to person, place, and time. CBC:   Lab Results   Component Value Date    WBC 9.3 02/07/2020    HGB 14.9 02/07/2020    HCT 43.9 02/07/2020     02/07/2020     CMP:  Lab Results   Component Value Date     10/22/2020    K 4.1 10/22/2020     10/22/2020    CO2 23 10/22/2020    ANIONGAP 10 10/22/2020    GLUCOSE 101 10/22/2020    BUN 10 10/22/2020    CREATININE 0.7 10/22/2020    GFRAA >60 10/22/2020    CALCIUM 9.7 10/22/2020    PROT 7.4 01/15/2020    LABALBU 4.4 01/15/2020    AGRATIO 1.5 01/15/2020    BILITOT <0.2 01/15/2020    ALKPHOS 73 01/15/2020    ALT 29 01/15/2020    AST 23 01/15/2020    GLOB 3.0 01/15/2020     URINALYSIS:  Lab Results   Component Value Date    GLUCOSEU neg 05/02/2018    GLUCOSEU Negative 01/15/2018    KETUA neg 05/02/2018    KETUA Negative 01/15/2018    SPECGRAV 1.020 05/02/2018    SPECGRAV 1.022 01/15/2018    BLOODU trace-lysed 05/02/2018    BLOODU Negative 01/15/2018    PHUR 7.0 05/02/2018    PHUR 6.0 01/15/2018    PROTEINU neg 05/02/2018    PROTEINU Negative 01/15/2018    NITRU POSITIVE 01/15/2018    LEUKOCYTESUR trace 05/02/2018    LEUKOCYTESUR TRACE 01/15/2018    LABMICR YES 01/15/2018    URINETYPE Voided 01/15/2018     MICRO/ALB:   Lab Results   Component Value Date    LABMICR YES 01/15/2018     LIPID:  Lab Results   Component Value Date    CHOL 154 10/22/2020    TRIG 157 10/22/2020    HDL 40 10/22/2020    LDLCALC 83 10/22/2020    LABVLDL 31 10/22/2020     TSH:   Lab Results   Component Value Date    TSHREFLEX 2.77 01/15/2018         ASSESSMENT/PLAN:  Assessment/Plan:  Katharina Pro was seen today for nausea and back pain. Diagnoses and all orders for this visit:    Chronic back pain greater than 3 months duration  Continue to follow-up with Dr. Dianne Agarwal. Temporarily disability parking for 3 months.   Sicca complex (HCC)  Symptoms are stable. Left adrenal mass (HCC)  -     CT ABDOMEN PELVIS WO CONTRAST Additional Contrast? 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 Patient right lower leg pain got better now occasionally get left lower leg pain. She has told the most of the leg pain symptoms could be secondary from the back    Hyperlipidemia, unspecified hyperlipidemia type  Pending lab work. She did miss 2 weeks of atorvastatin. Diarrhea, unspecified type associated with occasional nausea. Could be related to long-term use of doxycycline.  -     BASIC METABOLIC PANEL; Future  -     C DIFF TOXIN/ANTIGEN; Future    Other orders  -     Cholecalciferol (VITAMIN D3) 1.25 MG (01960 UT) CAPS;  Take 50,000 capsules by mouth once a week          Orders Placed This Encounter   Procedures    C DIFF TOXIN/ANTIGEN     Standing Status:   Future     Standing Expiration Date:   4/16/2022    CT ABDOMEN PELVIS WO CONTRAST Additional Contrast? None     Standing Status:   Future     Standing Expiration Date:   4/16/2022     Order Specific Question:   Additional Contrast?     Answer:   None    BASIC METABOLIC PANEL     Standing Status:   Future     Standing Expiration Date:   4/16/2022     Current Outpatient Medications   Medication Sig Dispense Refill    Cholecalciferol (VITAMIN D3) 1.25 MG (14973 UT) CAPS Take 50,000 capsules by mouth once a week 12 capsule 1    albuterol sulfate HFA (VENTOLIN HFA) 108 (90 Base) MCG/ACT inhaler Inhale 2 puffs into the lungs every 6 hours as needed for Wheezing 1 Inhaler 5    fluticasone-salmeterol (ADVAIR) 250-50 MCG/DOSE AEPB Inhale 1 puff into the lungs every 12 hours 60 each 5    FLUoxetine (PROZAC) 20 MG capsule Take 1 capsule by mouth daily 30 capsule 5    tiZANidine (ZANAFLEX) 4 MG tablet TAKE 1 TABLET BY MOUTH THREE TIMES DAILY 90 tablet 5    naproxen (NAPROSYN) 500 MG tablet Take 1 tablet by mouth 2 times daily as needed for Pain 180 tablet 1   

## 2021-04-16 NOTE — LETTER
[facility] Internal Medicine  [id number]  Phone: [phone]  Fax: [phone]    Parmjit Castanon MD        April 16, 2021     Patient: Lor Sims   YOB: 1985   Date of Visit: 4/16/2021       To Whom It May Concern: It is my medical opinion that Syeda Morley requires a disability parking placard for the following reasons:  She has limited walking ability due to an arthritic condition. Duration of need: 3 months    If you have any questions or concerns, please don't hesitate to call.     Sincerely,        Parmjit Castanon MD

## 2021-04-17 LAB
ALBUMIN SERPL-MCNC: 4.6 G/DL (ref 3.4–5)
ALP BLD-CCNC: 85 U/L (ref 40–129)
ALT SERPL-CCNC: 18 U/L (ref 10–40)
AST SERPL-CCNC: 17 U/L (ref 15–37)
BILIRUB SERPL-MCNC: <0.2 MG/DL (ref 0–1)
BILIRUBIN DIRECT: <0.2 MG/DL (ref 0–0.3)
BILIRUBIN, INDIRECT: NORMAL MG/DL (ref 0–1)
CHOLESTEROL, FASTING: 226 MG/DL (ref 0–199)
HDLC SERPL-MCNC: 39 MG/DL (ref 40–60)
LDL CHOLESTEROL CALCULATED: 146 MG/DL
TOTAL PROTEIN: 7.5 G/DL (ref 6.4–8.2)
TRIGLYCERIDE, FASTING: 203 MG/DL (ref 0–150)
VLDLC SERPL CALC-MCNC: 41 MG/DL

## 2021-04-20 DIAGNOSIS — E78.5 HYPERLIPIDEMIA, UNSPECIFIED HYPERLIPIDEMIA TYPE: ICD-10-CM

## 2021-04-20 RX ORDER — ATORVASTATIN CALCIUM 20 MG/1
20 TABLET, FILM COATED ORAL DAILY
Qty: 30 TABLET | Refills: 5 | Status: SHIPPED | OUTPATIENT
Start: 2021-04-20 | End: 2021-11-19

## 2021-05-19 RX ORDER — SPIRONOLACTONE 50 MG/1
50 TABLET, FILM COATED ORAL 2 TIMES DAILY
COMMUNITY
End: 2022-07-07 | Stop reason: SDUPTHER

## 2021-05-19 NOTE — PROGRESS NOTES
PATIENT REACHED   YES__X__NO____    PREOP INSTRUCTIONS LEFT ON VM NUMBER_______________      DATE_5/20/21________ TIME_1300________ARRIVAL_1200_______PLACE__masc__________  NOTHING TO EAT OR DRINK  AFTER MIDNIGHT THE EVENING PRIOR OR AS INSTRUCTED BY YOUR DR.  Jaycob Lynch NEED A RESPONSIBLE ADULT AGE 18 OR OLDER TO DRIVE YOU HOME  PLEASE BRING INSURANCE CARD. PICTURE ID AND COMPLETE LIST OF MEDS  WEAR LOOSE COMFORTABLE CLOTHING  FOLLOW ANY INSTRUCTIONS YOUR DRS OFFICE HAS GIVEN YOU,INCLUDING WHAT MEDICATIONS TO TAKE THE AM OF PROCEDURE AND WHEN AND IF YOU NEED TO STOP ANY BLOOD THINNERS. IF YOU HAVE QUESTIONS REGARDING THIS CALL THE OFFICE  THE GOAL BLOOD SUGAR THE AM OF PROCEDURE  OR LESS ABOVE THAT THE PROCEDURE MAY BE CANCELLED  ANY QUESTIONS CALL YOUR DOCTOR. ALSO,PLEASE READ THE INSTRUCTION PACKET FROM YOUR DR IF YOU RECEIVED ONE. SPINE INTERVENTION NUMBER [phone]      OTHER___________________________________      VISITOR POLICY(subject to change)      There is a one visitor policy at [facility] for all surgeries and endoscopies. Whether the visitor can stay or will be asked to wait in the car will depend on the current policy and if social distancing can be maintained. The policy is subject to change at any time. Please make sure the visitor has a cell phone that is on,charged and able to accept calls, as this may be the way that the staff communicates with them. Pain management is NO VISITOR policyThe patients ride is expected to remain in the car with a cell phone for communication. If the ride is leaving the hospital grounds please make sure they are back in time for pickup. Have the patient inform the staff on arrival what their rides plans are while the patient is in the facility. At the MAIN there is one visitor allowed. Please note that the visitor policy is subject to change.

## 2021-05-20 ENCOUNTER — HOSPITAL ENCOUNTER (OUTPATIENT)
Age: 36
Setting detail: OUTPATIENT SURGERY
Discharge: HOME | End: 2021-05-20
Attending: PHYSICAL MEDICINE & REHABILITATION | Admitting: PHYSICAL MEDICINE & REHABILITATION
Payer: COMMERCIAL

## 2021-05-20 ENCOUNTER — APPOINTMENT (OUTPATIENT)
Dept: GENERAL RADIOLOGY | Age: 36
End: 2021-05-20
Attending: PHYSICAL MEDICINE & REHABILITATION
Payer: COMMERCIAL

## 2021-05-20 VITALS
WEIGHT: 232 LBS | SYSTOLIC BLOOD PRESSURE: 98 MMHG | BODY MASS INDEX: 46.77 KG/M2 | HEART RATE: 74 BPM | OXYGEN SATURATION: 100 % | RESPIRATION RATE: 16 BRPM | HEIGHT: 59 IN | DIASTOLIC BLOOD PRESSURE: 72 MMHG | TEMPERATURE: 96.2 F

## 2021-05-20 LAB — HCG(URINE) PREGNANCY TEST: NEGATIVE

## 2021-05-20 PROCEDURE — 2500000003 HC RX 250 WO HCPCS: Performed by: PHYSICAL MEDICINE & REHABILITATION

## 2021-05-20 PROCEDURE — 99152 MOD SED SAME PHYS/QHP 5/>YRS: CPT | Performed by: PHYSICAL MEDICINE & REHABILITATION

## 2021-05-20 PROCEDURE — 3209999900 FLUORO FOR SURGICAL PROCEDURES

## 2021-05-20 PROCEDURE — 2709999900 HC NON-CHARGEABLE SUPPLY: Performed by: PHYSICAL MEDICINE & REHABILITATION

## 2021-05-20 PROCEDURE — 6360000002 HC RX W HCPCS: Performed by: PHYSICAL MEDICINE & REHABILITATION

## 2021-05-20 PROCEDURE — 3610000054 HC PAIN LEVEL 3 BASE (NON-OR): Performed by: PHYSICAL MEDICINE & REHABILITATION

## 2021-05-20 PROCEDURE — 84703 CHORIONIC GONADOTROPIN ASSAY: CPT

## 2021-05-20 RX ORDER — FENTANYL CITRATE 50 UG/ML
INJECTION, SOLUTION INTRAMUSCULAR; INTRAVENOUS
Status: COMPLETED | OUTPATIENT
Start: 2021-05-20 | End: 2021-05-20

## 2021-05-20 RX ORDER — LIDOCAINE HYDROCHLORIDE 10 MG/ML
INJECTION, SOLUTION EPIDURAL; INFILTRATION; INTRACAUDAL; PERINEURAL
Status: COMPLETED | OUTPATIENT
Start: 2021-05-20 | End: 2021-05-20

## 2021-05-20 RX ORDER — DEXAMETHASONE SODIUM PHOSPHATE 10 MG/ML
INJECTION, SOLUTION INTRAMUSCULAR; INTRAVENOUS
Status: COMPLETED | OUTPATIENT
Start: 2021-05-20 | End: 2021-05-20

## 2021-05-20 RX ORDER — MIDAZOLAM HYDROCHLORIDE 1 MG/ML
INJECTION INTRAMUSCULAR; INTRAVENOUS
Status: COMPLETED | OUTPATIENT
Start: 2021-05-20 | End: 2021-05-20

## 2021-05-20 ASSESSMENT — PAIN DESCRIPTION - DESCRIPTORS: DESCRIPTORS: SHOOTING;THROBBING

## 2021-05-20 ASSESSMENT — PAIN SCALES - GENERAL
PAINLEVEL_OUTOF10: 0
PAINLEVEL_OUTOF10: 0

## 2021-05-20 NOTE — H&P
HISTORY AND PHYSICAL/PRE-SEDATION ASSESSMENT    Patient:  Yenni Tejeda   :  1985  Medical Record No.:  [id number]   Date:  2021  Physician:  Jefry Montes MD  Facility: [address] [place] [address] Drive:                 The patient is a 39 y.o. female whom presents with leg pain. Review of the imaging and physical exam of the patient confirmed the pre-procedure diagnosis. After a thorough discussion of risks, benefits and alternatives informed consent was obtained. Diagnosis:  M54.16 LUMBAR RADICULOPATHY    Past Medical History:   Past Medical History:   Diagnosis Date    Asthma     Depression     HS [facility]hereditary spherocytosis) ([facility] [address]     HTN (hypertension)     Thyroid disease         Past Surgical History:     Past Surgical History:   Procedure Laterality Date     SECTION      OTHER SURGICAL HISTORY Right 2018    EXCISION OF SEBACEOUS CYST OF BACK UPPER NECK       Current Medications:   Prior to Admission medications    Medication Sig Start Date End Date Taking? Authorizing Provider   spironolactone (ALDACTONE) 50 MG tablet Take 50 mg by mouth 2 times daily   Yes Historical Provider, MD   atorvastatin (LIPITOR) 20 MG tablet Take 1 tablet by mouth daily 21  Yes George Engel MD   Cholecalciferol (VITAMIN D3) 1.25 MG (38271 UT) CAPS Take 50,000 capsules by mouth once a week 21  Yes George Engel MD   FLUoxetine (PROZAC) 20 MG capsule Take 1 capsule by mouth daily 3/17/21  Yes George Engel MD   tiZANidine (ZANAFLEX) 4 MG tablet TAKE 1 TABLET BY MOUTH THREE TIMES DAILY 3/17/21  Yes George Engel MD   melatonin 3 MG TABS tablet Take 1 tablet by mouth daily 20  Yes George Engel MD   nystatin (MYCOSTATIN) 289725 UNIT/GM powder Apply topically 4 times daily Apply topically 4 times daily.  10/13/20  Yes George Engel MD   clindamycin (CLINDAGEL) 1 % gel Apply topically to affected areas twice a day 20  Yes Historical Provider, MD   BENZOYL PEROXIDE 5 % external wash Apply 142 g topically daily 12/31/19  Yes Historical Provider, MD   albuterol sulfate HFA (VENTOLIN HFA) 108 (90 Base) MCG/ACT inhaler Inhale 2 puffs into the lungs every 6 hours as needed for Wheezing 3/17/21   Pooja Garcia MD   fluticasone-salmeterol (ADVAIR) 250-50 MCG/DOSE AEPB Inhale 1 puff into the lungs every 12 hours 3/17/21   Pooja Garcia MD   naproxen (NAPROSYN) 500 MG tablet Take 1 tablet by mouth 2 times daily as needed for Pain 3/17/21   Pamelada Estiven Ramos MD   hydrOXYzine (ATARAX) 25 MG tablet Take 1 tablet by mouth every 8 hours as needed for Anxiety 3/17/21   SANCHO Ramos MD   adalimumab (HUMIRA PEN-PS/UV/ADOL HS START) 40 MG/0.8ML injection Inject 80 mg into the skin once for 1 dose Inject 80 mg SC x 3 doses on days 1, 2, 15, then 40 mg SC qwk on day 29. 1/15/20 4/16/21  Ashley Samaniego MD   Levonorgestrel (MIRENA IU) by Intrauterine route. Historical Provider, MD       Allergies:  Chantix [varenicline] and Seroquel [quetiapine fumarate]    Social History:    reports that she has been smoking cigarettes. She started smoking about 23 years ago. She has been smoking about 0.10 packs per day. She has never used smokeless tobacco. She reports current alcohol use. She reports that she does not use drugs. Family History:   Family History   Adopted: Yes   Family history unknown: Yes        Vitals: Blood pressure 98/76, pulse 77, temperature 96.2 °F (35.7 °C), temperature source Temporal, resp. rate 16, height 4' 11\" (1.499 m), weight 232 lb (105.2 kg), SpO2 96 %, not currently breastfeeding. PHYSICAL EXAM:  HENT: Airway patent and reviewed  Cardiovascular: Normal rate, regular rhythm, normal heart sounds. Pulmonary/Chest: No wheezes. No rhonchi. No rales. Abdominal: Soft. Bowel sounds are normal. No distension.   Extremities: Moves all extremities equally  Lumbar Spine: Painful range of motion, no midline tenderness ASA CLASS:         []   I. Normal, healthy adult           [x]   II.  Mild systemic disease            []   III. Severe systemic disease    Mallampati: Mallampati Class II - (soft palate, fauces & uvula are visible)      Sedation plan:   []  Local              [x]  Minimal                  []  General anesthesia    Treatment plan:  Patient's condition acceptable for planned procedure/sedation. Proceed with planned procedure   Post Procedure Plan   Return to same level of care   ______________________     The risks and benefits as well as alternatives to the procedure have been discussed with the patient and or family. The patient and/or next of kin understands and agrees to proceed.     Horace Dial MD

## 2021-05-20 NOTE — OP NOTE
were slowly injected at each site. The needles were removed after the stylets were repositioned. A sterile bandage was applied. The patient was brought to recovery in stable condition. The patient tolerated the procedure well. DISPOSITION:  The patient was transported to recovery. The patient was monitored for 15 to 20 minutes post-procedure. Precautions were discussed and written instructions provided. Comment: Good epidural and NR flow.

## 2021-07-01 RX ORDER — GABAPENTIN 300 MG/1
CAPSULE ORAL
Qty: 90 CAPSULE | Refills: 0 | OUTPATIENT
Start: 2021-07-01 | End: 2021-07-31

## 2021-07-22 ENCOUNTER — OFFICE VISIT (OUTPATIENT)
Dept: INTERNAL MEDICINE CLINIC | Age: 36
End: 2021-07-22
Payer: COMMERCIAL

## 2021-07-22 VITALS
BODY MASS INDEX: 46 KG/M2 | OXYGEN SATURATION: 98 % | HEIGHT: 59 IN | SYSTOLIC BLOOD PRESSURE: 132 MMHG | HEART RATE: 94 BPM | WEIGHT: 228.2 LBS | DIASTOLIC BLOOD PRESSURE: 86 MMHG

## 2021-07-22 DIAGNOSIS — E78.5 HYPERLIPIDEMIA, UNSPECIFIED HYPERLIPIDEMIA TYPE: ICD-10-CM

## 2021-07-22 DIAGNOSIS — F32.A DEPRESSION, UNSPECIFIED DEPRESSION TYPE: ICD-10-CM

## 2021-07-22 DIAGNOSIS — J45.40 MODERATE PERSISTENT ASTHMA WITHOUT COMPLICATION: Primary | ICD-10-CM

## 2021-07-22 DIAGNOSIS — E55.9 VITAMIN D DEFICIENCY: ICD-10-CM

## 2021-07-22 DIAGNOSIS — R19.7 DIARRHEA, UNSPECIFIED TYPE: ICD-10-CM

## 2021-07-22 DIAGNOSIS — M25.50 ARTHRALGIA OF MULTIPLE JOINTS: ICD-10-CM

## 2021-07-22 DIAGNOSIS — E78.01 FAMILIAL HYPERCHOLESTEROLEMIA: ICD-10-CM

## 2021-07-22 PROCEDURE — 4004F PT TOBACCO SCREEN RCVD TLK: CPT | Performed by: INTERNAL MEDICINE

## 2021-07-22 PROCEDURE — G8417 CALC BMI ABV UP PARAM F/U: HCPCS | Performed by: INTERNAL MEDICINE

## 2021-07-22 PROCEDURE — G8427 DOCREV CUR MEDS BY ELIG CLIN: HCPCS | Performed by: INTERNAL MEDICINE

## 2021-07-22 PROCEDURE — 99214 OFFICE O/P EST MOD 30 MIN: CPT | Performed by: INTERNAL MEDICINE

## 2021-07-22 RX ORDER — CHOLECALCIFEROL (VITAMIN D3) 1250 MCG
50000 CAPSULE ORAL WEEKLY
Qty: 12 CAPSULE | Refills: 1 | Status: SHIPPED | OUTPATIENT
Start: 2021-07-22 | End: 2021-09-16 | Stop reason: SDUPTHER

## 2021-07-22 ASSESSMENT — ENCOUNTER SYMPTOMS
WHEEZING: 0
BLOOD IN STOOL: 0
TROUBLE SWALLOWING: 0
VOICE CHANGE: 0
RECTAL PAIN: 0
PHOTOPHOBIA: 0
SHORTNESS OF BREATH: 0

## 2021-07-26 NOTE — TELEPHONE ENCOUNTER
Please let pt know that both her MRI pelvis and nuclear bone scan did not show any evidence inflammation in her joints. Meaning that she likely does not currently have active inflammatory arthritis. I recommend that she follows up with a dermatology MD regarding her skin (HS) since it's very active, I would consider something like Humira for her skin and could potentially be helpful for her joint pain but need input from dermatologist.  Please see if we can break up a NPV visit to see her sooner, if not please add her to our cancellation list and we'll call her if someone cancels. Do not double book my schedule. Myalgia Monitoring: I explained this is common when taking isotretinoin. If this worsens they will contact us. Add High Risk Medication Management Associated Diagnosis?: No Xerosis Normal Treatment: I recommended application of Cetaphil or CeraVe numerous times a day and before going to bed to all dry areas. Myalgia Treatment: I explained this is common when taking isotretinoin. If this worsens they will contact us. They may try OTC ibuprofen. Xerosis Aggressive Treatment: I recommended application of Cetaphil or CeraVe numerous times a day and before going to bed to all dry areas. I also prescribed a topical steroid for twice daily use. Are Labs Available For Review?: Yes Cheilitis Normal Treatment: I recommended application of Vaseline or Aquaphor numerous times a day (as often as every hour) and before going to bed. Cheilitis Aggressive Treatment: I recommended application of Vaseline or Aquaphor numerous times a day (as often as every hour) and before going to bed. I also prescribed a topical steroid for twice daily use. Any Myalgia?: Yes - Monitoring Ipledge Number (Optional): [id number] Next Month's Dosage: Continue Current Dosage Weight Units: pounds Hypercholesterolemia Monitoring: I explained this is common when taking isotretinoin. We will monitor closely. Headache Monitoring: I recommended monitoring the headaches for now. There is no evidence of increased intracranial pressure. They were instructed to call if the headaches are worsening. Counseling Text: I reviewed the side effect in detail. Patient should get monthly blood tests, not donate blood, not drive at night if vision affected, and not share medication. Patient Weight (Optional But Required For Cumulative Dose-Numbers And Decimals Only): 145 Female Pregnancy Counseling Text: Female patients should also be on two forms of birth control while taking this medication and for one month after their last dose. Months Of Therapy Completed: 3 Use Therapeutic Ranged Or Therapeutic Target: please select Range or Target Lower Range (In Mg/Kg): 120 Pounds Preamble Statement (Weight Entered In Details Tab): Reported Weight in pounds: Dosing Month 1 (Required For Cumulative Dosing): 40mg Daily Upper Range (In Mg/Kg): 150 Retinoid Dermatitis Normal Treatment: I recommended more frequent application of Cetaphil or CeraVe to the areas of dermatitis. Kilograms Preamble Statement (Weight Entered In Details Tab): Reported Weight in kilograms: Nosebleeds Normal Treatment: I explained this is common when taking isotretinoin. I recommended saline mist in each nostril multiple times a day. If this worsens they will contact us. Retinoid Dermatitis Aggressive Treatment: I recommended more frequent application of Cetaphil or CeraVe to the areas of dermatitis. I also prescribed a topical steroid for twice daily use until the dermatitis resolves. Xerosis Normal Treatment: I recommended application of Cetaphil or CeraVe numerous times a day going to bed to all dry areas. Target Cumulative Dosage (In Mg/Kg): 135 Female Completion Statement: After discussing her treatment course we decided to discontinue isotretinoin therapy at this time. I explained that she would need to continue her birth control methods for at least one month after the last dosage. She should also get a pregnancy test one month after the last dose. She shouldn't donate blood for one month after the last dose. She should call with any new symptoms of depression. Dosing Month 2 (Required For Cumulative Dosing): 40mg BID Xerosis Aggressive Treatment: I recommended application of Cetaphil or CeraVe numerous times a day going to bed to all dry areas. I also prescribed a topical steroid for twice daily use. Detail Level: Zone Male Completion Statement: After discussing his treatment course we decided to discontinue isotretinoin therapy at this time. He shouldn't donate blood for one month after the last dose. He should call with any new symptoms of depression. Is Xerosis Present?: Yes - Normal Treatment What Is The Patient's Gender: Male

## 2021-08-23 DIAGNOSIS — F51.01 PRIMARY INSOMNIA: ICD-10-CM

## 2021-08-24 RX ORDER — HYDROXYZINE HYDROCHLORIDE 25 MG/1
TABLET, FILM COATED ORAL
Qty: 40 TABLET | Refills: 1 | Status: SHIPPED | OUTPATIENT
Start: 2021-08-24 | End: 2021-09-17

## 2021-08-25 ENCOUNTER — TELEPHONE (OUTPATIENT)
Dept: INTERNAL MEDICINE CLINIC | Age: 36
End: 2021-08-25

## 2021-08-25 NOTE — TELEPHONE ENCOUNTER
----- Message from Edin Lugo sent at 2021 11:11 AM EDT -----  Subject: Message to Provider    QUESTIONS  Information for Provider? Patient has a handicapped sticker but it is   . She is requesting a new script for it. Please advise.   ---------------------------------------------------------------------------  --------------  CALL BACK INFO  What is the best way for the office to contact you? OK to leave message on   voicemail  Preferred Call Back Phone Number? [phone]  ---------------------------------------------------------------------------  --------------  SCRIPT ANSWERS  Relationship to Patient?  Self

## 2021-09-16 ENCOUNTER — TELEPHONE (OUTPATIENT)
Dept: INTERNAL MEDICINE CLINIC | Age: 36
End: 2021-09-16

## 2021-09-16 DIAGNOSIS — F51.01 PRIMARY INSOMNIA: ICD-10-CM

## 2021-09-16 DIAGNOSIS — E55.9 VITAMIN D DEFICIENCY: ICD-10-CM

## 2021-09-16 RX ORDER — LANOLIN ALCOHOL/MO/W.PET/CERES
3 CREAM (GRAM) TOPICAL DAILY
Qty: 90 TABLET | Refills: 1 | Status: SHIPPED | OUTPATIENT
Start: 2021-09-16 | End: 2021-09-22 | Stop reason: SDUPTHER

## 2021-09-16 RX ORDER — CHOLECALCIFEROL (VITAMIN D3) 1250 MCG
50000 CAPSULE ORAL WEEKLY
Qty: 12 CAPSULE | Refills: 1 | Status: SHIPPED | OUTPATIENT
Start: 2021-09-16 | End: 2022-07-07 | Stop reason: SDUPTHER

## 2021-09-16 NOTE — TELEPHONE ENCOUNTER
----- Message from Taye Staples sent at 9/16/2021 10:26 AM EDT -----  Subject: Refill Request    QUESTIONS  Name of Medication? melatonin 3 MG TABS tablet  Patient-reported dosage and instructions? daily  How many days do you have left? 0  Preferred Pharmacy? [facility] phone number (if available)? [phone]  ---------------------------------------------------------------------------  --------------,  Name of Medication? Cholecalciferol (VITAMIN D3) 1.25 MG (52893 UT) CAPS  Patient-reported dosage and instructions? daily  How many days do you have left? 0  Preferred Pharmacy? [facility] phone number (if available)? [phone]  ---------------------------------------------------------------------------  --------------  CALL BACK INFO  What is the best way for the office to contact you? OK to leave message on   voicemail  Preferred Call Back Phone Number?  [phone]

## 2021-09-17 RX ORDER — HYDROXYZINE HYDROCHLORIDE 25 MG/1
TABLET, FILM COATED ORAL
Qty: 40 TABLET | Refills: 0 | Status: SHIPPED | OUTPATIENT
Start: 2021-09-17 | End: 2021-10-19

## 2021-09-21 DIAGNOSIS — F51.01 PRIMARY INSOMNIA: ICD-10-CM

## 2021-09-21 NOTE — TELEPHONE ENCOUNTER
----- Message from William Greco sent at 9/20/2021  6:05 PM EDT -----  Subject: Message to Provider    QUESTIONS  Information for Provider? For Dr Jamel Kennedy..... they are calling for refill   for patient tera dumont please call so they can finish processing   refill you can call [phone] anyone who answers can help  ---------------------------------------------------------------------------  --------------  CALL BACK INFO  What is the best way for the office to contact you? OK to leave message on   voicemail  Preferred Call Back Phone Number? [phone]  ---------------------------------------------------------------------------  --------------  SCRIPT ANSWERS  Relationship to Patient?  Third Party  Representative Name? [facility]

## 2021-09-22 RX ORDER — LANOLIN ALCOHOL/MO/W.PET/CERES
3 CREAM (GRAM) TOPICAL DAILY
Qty: 90 TABLET | Refills: 1 | Status: SHIPPED | OUTPATIENT
Start: 2021-09-22 | End: 2022-08-16

## 2021-10-13 ENCOUNTER — CARE COORDINATION (OUTPATIENT)
Dept: CARE COORDINATION | Age: 36
End: 2021-10-13

## 2021-10-13 ENCOUNTER — OFFICE VISIT (OUTPATIENT)
Dept: INTERNAL MEDICINE CLINIC | Age: 36
End: 2021-10-13
Payer: COMMERCIAL

## 2021-10-13 VITALS
OXYGEN SATURATION: 98 % | SYSTOLIC BLOOD PRESSURE: 122 MMHG | DIASTOLIC BLOOD PRESSURE: 82 MMHG | HEART RATE: 83 BPM | WEIGHT: 214 LBS | TEMPERATURE: 97.3 F | BODY MASS INDEX: 43.22 KG/M2

## 2021-10-13 DIAGNOSIS — E78.5 HYPERLIPIDEMIA, UNSPECIFIED HYPERLIPIDEMIA TYPE: ICD-10-CM

## 2021-10-13 DIAGNOSIS — J45.41 MODERATE PERSISTENT ASTHMA WITH EXACERBATION: Primary | ICD-10-CM

## 2021-10-13 DIAGNOSIS — M54.50 LOW BACK PAIN WITHOUT SCIATICA, UNSPECIFIED BACK PAIN LATERALITY, UNSPECIFIED CHRONICITY: ICD-10-CM

## 2021-10-13 DIAGNOSIS — F43.9 STRESS: ICD-10-CM

## 2021-10-13 PROCEDURE — 99214 OFFICE O/P EST MOD 30 MIN: CPT | Performed by: INTERNAL MEDICINE

## 2021-10-13 PROCEDURE — G8427 DOCREV CUR MEDS BY ELIG CLIN: HCPCS | Performed by: INTERNAL MEDICINE

## 2021-10-13 PROCEDURE — 4004F PT TOBACCO SCREEN RCVD TLK: CPT | Performed by: INTERNAL MEDICINE

## 2021-10-13 PROCEDURE — G8484 FLU IMMUNIZE NO ADMIN: HCPCS | Performed by: INTERNAL MEDICINE

## 2021-10-13 PROCEDURE — G8417 CALC BMI ABV UP PARAM F/U: HCPCS | Performed by: INTERNAL MEDICINE

## 2021-10-13 RX ORDER — PREDNISONE 20 MG/1
20 TABLET ORAL 2 TIMES DAILY
Qty: 10 TABLET | Refills: 0 | Status: SHIPPED | OUTPATIENT
Start: 2021-10-13 | End: 2021-10-18

## 2021-10-13 RX ORDER — AZITHROMYCIN 250 MG/1
250 TABLET, FILM COATED ORAL SEE ADMIN INSTRUCTIONS
Qty: 6 TABLET | Refills: 0 | Status: SHIPPED | OUTPATIENT
Start: 2021-10-13 | End: 2021-10-18

## 2021-10-13 ASSESSMENT — ENCOUNTER SYMPTOMS
NAUSEA: 0
VOICE CHANGE: 0
CHEST TIGHTNESS: 0
WHEEZING: 1
TROUBLE SWALLOWING: 0
SHORTNESS OF BREATH: 0
CONSTIPATION: 0
VOMITING: 0

## 2021-10-13 NOTE — PROGRESS NOTES
Narrative    None     Social Determinants of Health     Financial Resource Strain:     Difficulty of Paying Living Expenses:    Food Insecurity:     Worried About Running Out of Food in the Last Year:     [address] in the Last Year:    Transportation Needs:     Lack of Transportation (Medical):  Lack of Transportation (Non-Medical):    Physical Activity:     Days of Exercise per Week:     Minutes of Exercise per Session:    Stress:     Feeling of Stress :    Social Connections:     Frequency of Communication with Friends and Family:     Frequency of Social Gatherings with Friends and Family:     Attends [employer] Services:     Active Member of Clubs or Organizations:     Attends Club or Organization Meetings:     Marital Status:    Intimate Partner Violence:     Fear of Current or Ex-Partner:     Emotionally Abused:     Physically Abused:     Sexually Abused:      Family History   Adopted: Yes   Family history unknown: Yes       Review of Systems   Constitutional: Negative for diaphoresis and unexpected weight change. HENT: Negative for trouble swallowing and voice change. Respiratory: Positive for wheezing. Negative for chest tightness and shortness of breath. Cardiovascular: Negative for chest pain and palpitations. Gastrointestinal: Negative for constipation, nausea and vomiting. Occasional belly cramps unchanged from baseline. She did not do CT scan abdomen despite multiple advices   Genitourinary: Negative for difficulty urinating, flank pain and frequency. Neurological: Negative for dizziness and light-headedness.        OBJECTIVE:  Pulse Readings from Last 4 Encounters:   10/13/21 83   07/22/21 94   05/20/21 74   04/16/21 80     Wt Readings from Last 4 Encounters:   10/13/21 214 lb (97.1 kg)   07/22/21 228 lb 3.2 oz (103.5 kg)   05/20/21 232 lb (105.2 kg)   04/16/21 255 lb (115.7 kg)     BP Readings from Last 4 Encounters:   10/13/21 122/82   07/22/21 132/86 05/20/21 98/72   04/16/21 118/88     Physical Exam  Vitals and nursing note reviewed. Constitutional:       Appearance: Normal appearance. She is not ill-appearing. Eyes:      General: No scleral icterus. Conjunctiva/sclera: Conjunctivae normal.   Cardiovascular:      Rate and Rhythm: Normal rate and regular rhythm. Pulses: Normal pulses. Heart sounds: Normal heart sounds. Pulmonary:      Effort: Pulmonary effort is normal.      Comments: Bilateral scattered wheezing. No respiratory distress. Good air entry  Abdominal:      General: Bowel sounds are normal.      Tenderness: There is no right CVA tenderness, left CVA tenderness, guarding or rebound. Musculoskeletal:      Right lower leg: No edema. Neurological:      General: No focal deficit present. Mental Status: She is alert and oriented to person, place, and time.          CBC:   Lab Results   Component Value Date    WBC 9.3 02/07/2020    HGB 14.9 02/07/2020    HCT 43.9 02/07/2020     02/07/2020     CMP:  Lab Results   Component Value Date     10/22/2020    K 4.1 10/22/2020     10/22/2020    CO2 23 10/22/2020    ANIONGAP 10 10/22/2020    GLUCOSE 101 10/22/2020    BUN 10 10/22/2020    CREATININE 0.7 10/22/2020    GFRAA >60 10/22/2020    CALCIUM 9.7 10/22/2020    PROT 7.5 04/16/2021    LABALBU 4.6 04/16/2021    AGRATIO 1.5 01/15/2020    BILITOT <0.2 04/16/2021    ALKPHOS 85 04/16/2021    ALT 18 04/16/2021    AST 17 04/16/2021    GLOB 3.0 01/15/2020     URINALYSIS:  Lab Results   Component Value Date    GLUCOSEU neg 05/02/2018    GLUCOSEU Negative 01/15/2018    KETUA neg 05/02/2018    KETUA Negative 01/15/2018    SPECGRAV 1.020 05/02/2018    SPECGRAV 1.022 01/15/2018    BLOODU trace-lysed 05/02/2018    BLOODU Negative 01/15/2018    PHUR 7.0 05/02/2018    PHUR 6.0 01/15/2018    PROTEINU neg 05/02/2018    PROTEINU Negative 01/15/2018    NITRU POSITIVE 01/15/2018    LEUKOCYTESUR trace 05/02/2018    LEUKOCYTESUR TRACE PANEL     Standing Status:   Future     Number of Occurrences:   1     Standing Expiration Date:   10/13/2022    Lipid, Fasting     Standing Status:   Future     Number of Occurrences:   1     Standing Expiration Date:   10/13/2022    Urinalysis     Standing Status:   Future     Standing Expiration Date:   10/13/2022    HEPATIC FUNCTION PANEL     Standing Status:   Future     Number of Occurrences:   1     Standing Expiration Date:   10/13/2022     Current Outpatient Medications   Medication Sig Dispense Refill    predniSONE (DELTASONE) 20 MG tablet Take 1 tablet by mouth 2 times daily for 5 days 10 tablet 0    azithromycin (ZITHROMAX) 250 MG tablet Take 1 tablet by mouth See Admin Instructions for 5 days 500mg on day 1 followed by 250mg on days 2 - 5 6 tablet 0    melatonin 3 MG TABS tablet Take 1 tablet by mouth daily 90 tablet 1    hydrOXYzine (ATARAX) 25 MG tablet TAKE 1 TABLET BY MOUTH EVERY 8 HOURS AS NEEDED FOR ANXIETY 40 tablet 0    Cholecalciferol (VITAMIN D3) 1.25 MG (54328 UT) CAPS Take 50,000 capsules by mouth once a week 12 capsule 1    spironolactone (ALDACTONE) 50 MG tablet Take 50 mg by mouth 2 times daily      atorvastatin (LIPITOR) 20 MG tablet Take 1 tablet by mouth daily 30 tablet 5    albuterol sulfate HFA (VENTOLIN HFA) 108 (90 Base) MCG/ACT inhaler Inhale 2 puffs into the lungs every 6 hours as needed for Wheezing 1 Inhaler 5    fluticasone-salmeterol (ADVAIR) 250-50 MCG/DOSE AEPB Inhale 1 puff into the lungs every 12 hours 60 each 5    FLUoxetine (PROZAC) 20 MG capsule Take 1 capsule by mouth daily 30 capsule 5    tiZANidine (ZANAFLEX) 4 MG tablet TAKE 1 TABLET BY MOUTH THREE TIMES DAILY 90 tablet 5    naproxen (NAPROSYN) 500 MG tablet Take 1 tablet by mouth 2 times daily as needed for Pain 180 tablet 1    nystatin (MYCOSTATIN) 685010 UNIT/GM powder Apply topically 4 times daily Apply topically 4 times daily.  1 Bottle 11    clindamycin (CLINDAGEL) 1 % gel Apply topically to affected areas twice a day      BENZOYL PEROXIDE 5 % external wash Apply 142 g topically daily      adalimumab (HUMIRA PEN-PS/UV/ADOL HS START) 40 MG/0.8ML injection Inject 80 mg into the skin once for 1 dose Inject 80 mg SC x 3 doses on days 1, 2, 15, then 40 mg SC qwk on day 29. 2 each 0    Levonorgestrel (MIRENA IU) by Intrauterine route. No current facility-administered medications for this visit. Return in about 3 months (around 1/13/2022). An After Visit Summary was printed and given to the patient. Documentation was done using voice recognition dragon software. Every effort was made to ensure accuracy; however, inadvertent  Unintentional computerized transcription errors may be present.

## 2021-10-13 NOTE — CARE COORDINATION
ACM attempted to reach pt today to discuss transportation options to and from her provider appointments and lab testing. ACM attempted the first number listed for patient and received a recording the patient is not accepting calls at this time. ACM attempted the contact number listed for her parents and left a generic VM message with name and contact information. ACM will make another attempt to reach patient at a later date/time.

## 2021-10-14 NOTE — CARE COORDINATION
Second attempt to reach patient. Pt was unable to reach today. ACM unable to leave a VM message on her contact number due to the number not currently accepting calls. ACM will make a final attempt at a later date/time.

## 2021-10-19 DIAGNOSIS — F51.01 PRIMARY INSOMNIA: ICD-10-CM

## 2021-10-19 RX ORDER — HYDROXYZINE HYDROCHLORIDE 25 MG/1
TABLET, FILM COATED ORAL
Qty: 40 TABLET | Refills: 0 | Status: SHIPPED | OUTPATIENT
Start: 2021-10-19 | End: 2021-11-29

## 2021-10-28 ENCOUNTER — CARE COORDINATION (OUTPATIENT)
Dept: CARE COORDINATION | Age: 36
End: 2021-10-28

## 2021-11-19 DIAGNOSIS — E78.5 HYPERLIPIDEMIA, UNSPECIFIED HYPERLIPIDEMIA TYPE: ICD-10-CM

## 2021-11-19 DIAGNOSIS — J45.40 MODERATE PERSISTENT ASTHMA WITHOUT COMPLICATION: ICD-10-CM

## 2021-11-19 DIAGNOSIS — F32.A DEPRESSION, UNSPECIFIED DEPRESSION TYPE: ICD-10-CM

## 2021-11-19 RX ORDER — FLUOXETINE HYDROCHLORIDE 20 MG/1
CAPSULE ORAL
Qty: 30 CAPSULE | Refills: 2 | Status: SHIPPED | OUTPATIENT
Start: 2021-11-19 | End: 2022-07-07 | Stop reason: SDUPTHER

## 2021-11-19 RX ORDER — ATORVASTATIN CALCIUM 20 MG/1
TABLET, FILM COATED ORAL
Qty: 30 TABLET | Refills: 2 | Status: SHIPPED | OUTPATIENT
Start: 2021-11-19 | End: 2022-07-07 | Stop reason: SDUPTHER

## 2021-11-19 RX ORDER — ALBUTEROL SULFATE 90 UG/1
AEROSOL, METERED RESPIRATORY (INHALATION)
Qty: 18 G | Refills: 2 | Status: SHIPPED | OUTPATIENT
Start: 2021-11-19 | End: 2022-07-07 | Stop reason: SDUPTHER

## 2021-11-26 DIAGNOSIS — G89.29 CHRONIC MIDLINE LOW BACK PAIN WITH BILATERAL SCIATICA: ICD-10-CM

## 2021-11-26 DIAGNOSIS — F51.01 PRIMARY INSOMNIA: ICD-10-CM

## 2021-11-26 DIAGNOSIS — M54.41 CHRONIC MIDLINE LOW BACK PAIN WITH BILATERAL SCIATICA: ICD-10-CM

## 2021-11-26 DIAGNOSIS — M54.42 CHRONIC MIDLINE LOW BACK PAIN WITH BILATERAL SCIATICA: ICD-10-CM

## 2021-11-29 RX ORDER — HYDROXYZINE HYDROCHLORIDE 25 MG/1
TABLET, FILM COATED ORAL
Qty: 40 TABLET | Refills: 0 | Status: SHIPPED | OUTPATIENT
Start: 2021-11-29 | End: 2021-12-20

## 2021-11-29 RX ORDER — TIZANIDINE 4 MG/1
TABLET ORAL
Qty: 90 TABLET | Refills: 2 | Status: SHIPPED | OUTPATIENT
Start: 2021-11-29 | End: 2022-07-07 | Stop reason: SDUPTHER

## 2021-12-17 DIAGNOSIS — F51.01 PRIMARY INSOMNIA: ICD-10-CM

## 2021-12-20 RX ORDER — HYDROXYZINE HYDROCHLORIDE 25 MG/1
TABLET, FILM COATED ORAL
Qty: 40 TABLET | Refills: 0 | Status: SHIPPED | OUTPATIENT
Start: 2021-12-20 | End: 2022-01-13

## 2022-07-07 ENCOUNTER — OFFICE VISIT (OUTPATIENT)
Dept: INTERNAL MEDICINE CLINIC | Age: 37
End: 2022-07-07
Payer: COMMERCIAL

## 2022-07-07 VITALS
WEIGHT: 218.6 LBS | OXYGEN SATURATION: 94 % | BODY MASS INDEX: 44.07 KG/M2 | HEART RATE: 79 BPM | SYSTOLIC BLOOD PRESSURE: 120 MMHG | HEIGHT: 59 IN | DIASTOLIC BLOOD PRESSURE: 86 MMHG

## 2022-07-07 DIAGNOSIS — J45.40 MODERATE PERSISTENT ASTHMA WITHOUT COMPLICATION: ICD-10-CM

## 2022-07-07 DIAGNOSIS — M35.00 SICCA COMPLEX (HCC): ICD-10-CM

## 2022-07-07 DIAGNOSIS — B37.2 CANDIDAL INTERTRIGO: ICD-10-CM

## 2022-07-07 DIAGNOSIS — M25.50 ARTHRALGIA OF MULTIPLE JOINTS: ICD-10-CM

## 2022-07-07 DIAGNOSIS — E78.01 FAMILIAL HYPERCHOLESTEROLEMIA: ICD-10-CM

## 2022-07-07 DIAGNOSIS — Z13.1 SCREENING FOR DIABETES MELLITUS: ICD-10-CM

## 2022-07-07 DIAGNOSIS — J45.41 MODERATE PERSISTENT ASTHMA WITH EXACERBATION: ICD-10-CM

## 2022-07-07 DIAGNOSIS — F51.01 PRIMARY INSOMNIA: ICD-10-CM

## 2022-07-07 DIAGNOSIS — G89.29 CHRONIC BACK PAIN GREATER THAN 3 MONTHS DURATION: ICD-10-CM

## 2022-07-07 DIAGNOSIS — R21 SKIN RASH: ICD-10-CM

## 2022-07-07 DIAGNOSIS — E55.9 VITAMIN D DEFICIENCY: ICD-10-CM

## 2022-07-07 DIAGNOSIS — Z83.3 FAMILY HISTORY OF DIABETES MELLITUS: ICD-10-CM

## 2022-07-07 DIAGNOSIS — E78.5 HYPERLIPIDEMIA, UNSPECIFIED HYPERLIPIDEMIA TYPE: ICD-10-CM

## 2022-07-07 DIAGNOSIS — M54.42 CHRONIC MIDLINE LOW BACK PAIN WITH BILATERAL SCIATICA: ICD-10-CM

## 2022-07-07 DIAGNOSIS — M54.9 CHRONIC BACK PAIN GREATER THAN 3 MONTHS DURATION: ICD-10-CM

## 2022-07-07 DIAGNOSIS — E27.8 LEFT ADRENAL MASS (HCC): ICD-10-CM

## 2022-07-07 DIAGNOSIS — F32.A DEPRESSION, UNSPECIFIED DEPRESSION TYPE: Primary | ICD-10-CM

## 2022-07-07 DIAGNOSIS — G89.29 CHRONIC MIDLINE LOW BACK PAIN WITH BILATERAL SCIATICA: ICD-10-CM

## 2022-07-07 DIAGNOSIS — Q27.32: ICD-10-CM

## 2022-07-07 DIAGNOSIS — M54.41 CHRONIC MIDLINE LOW BACK PAIN WITH BILATERAL SCIATICA: ICD-10-CM

## 2022-07-07 PROCEDURE — 99215 OFFICE O/P EST HI 40 MIN: CPT | Performed by: INTERNAL MEDICINE

## 2022-07-07 RX ORDER — ALBUTEROL SULFATE 90 UG/1
2 AEROSOL, METERED RESPIRATORY (INHALATION) EVERY 6 HOURS PRN
Qty: 3 EACH | Refills: 0 | Status: SHIPPED | OUTPATIENT
Start: 2022-07-07

## 2022-07-07 RX ORDER — HYDROXYZINE HYDROCHLORIDE 25 MG/1
TABLET, FILM COATED ORAL
Qty: 90 TABLET | Refills: 2 | Status: SHIPPED | OUTPATIENT
Start: 2022-07-07 | End: 2022-10-05 | Stop reason: SDUPTHER

## 2022-07-07 RX ORDER — ATORVASTATIN CALCIUM 20 MG/1
20 TABLET, FILM COATED ORAL DAILY
Qty: 90 TABLET | Refills: 0 | Status: SHIPPED | OUTPATIENT
Start: 2022-07-07 | End: 2022-10-05 | Stop reason: SDUPTHER

## 2022-07-07 RX ORDER — CHOLECALCIFEROL (VITAMIN D3) 1250 MCG
50000 CAPSULE ORAL WEEKLY
Qty: 12 CAPSULE | Refills: 0 | Status: SHIPPED | OUTPATIENT
Start: 2022-07-07

## 2022-07-07 RX ORDER — FLUTICASONE PROPIONATE AND SALMETEROL 250; 50 UG/1; UG/1
1 POWDER RESPIRATORY (INHALATION) EVERY 12 HOURS
Qty: 3 EACH | Refills: 0 | Status: SHIPPED | OUTPATIENT
Start: 2022-07-07 | End: 2022-10-05 | Stop reason: SDUPTHER

## 2022-07-07 RX ORDER — TIZANIDINE 4 MG/1
TABLET ORAL
Qty: 90 TABLET | Refills: 2 | Status: SHIPPED | OUTPATIENT
Start: 2022-07-07

## 2022-07-07 RX ORDER — NAPROXEN 500 MG/1
500 TABLET ORAL 2 TIMES DAILY PRN
Qty: 180 TABLET | Refills: 1 | Status: SHIPPED | OUTPATIENT
Start: 2022-07-07

## 2022-07-07 RX ORDER — FLUOXETINE HYDROCHLORIDE 20 MG/1
CAPSULE ORAL
Qty: 90 CAPSULE | Refills: 0 | Status: SHIPPED | OUTPATIENT
Start: 2022-07-07 | End: 2022-10-05 | Stop reason: SDUPTHER

## 2022-07-07 RX ORDER — SPIRONOLACTONE 50 MG/1
50 TABLET, FILM COATED ORAL 2 TIMES DAILY
Qty: 90 TABLET | Refills: 0 | Status: SHIPPED | OUTPATIENT
Start: 2022-07-07 | End: 2022-07-07

## 2022-07-07 RX ORDER — NYSTATIN 100000 [USP'U]/G
POWDER TOPICAL 4 TIMES DAILY
Qty: 3 EACH | Refills: 0 | Status: SHIPPED | OUTPATIENT
Start: 2022-07-07

## 2022-07-07 SDOH — ECONOMIC STABILITY: FOOD INSECURITY: WITHIN THE PAST 12 MONTHS, THE FOOD YOU BOUGHT JUST DIDN'T LAST AND YOU DIDN'T HAVE MONEY TO GET MORE.: NEVER TRUE

## 2022-07-07 SDOH — ECONOMIC STABILITY: FOOD INSECURITY: WITHIN THE PAST 12 MONTHS, YOU WORRIED THAT YOUR FOOD WOULD RUN OUT BEFORE YOU GOT MONEY TO BUY MORE.: NEVER TRUE

## 2022-07-07 ASSESSMENT — PATIENT HEALTH QUESTIONNAIRE - PHQ9
4. FEELING TIRED OR HAVING LITTLE ENERGY: 3
2. FEELING DOWN, DEPRESSED OR HOPELESS: 2
SUM OF ALL RESPONSES TO PHQ9 QUESTIONS 1 & 2: 5
3. TROUBLE FALLING OR STAYING ASLEEP: 2
SUM OF ALL RESPONSES TO PHQ QUESTIONS 1-9: 15
SUM OF ALL RESPONSES TO PHQ QUESTIONS 1-9: 15
6. FEELING BAD ABOUT YOURSELF - OR THAT YOU ARE A FAILURE OR HAVE LET YOURSELF OR YOUR FAMILY DOWN: 0
7. TROUBLE CONCENTRATING ON THINGS, SUCH AS READING THE NEWSPAPER OR WATCHING TELEVISION: 1
SUM OF ALL RESPONSES TO PHQ QUESTIONS 1-9: 15
5. POOR APPETITE OR OVEREATING: 1
SUM OF ALL RESPONSES TO PHQ QUESTIONS 1-9: 15
9. THOUGHTS THAT YOU WOULD BE BETTER OFF DEAD, OR OF HURTING YOURSELF: 0
1. LITTLE INTEREST OR PLEASURE IN DOING THINGS: 3
10. IF YOU CHECKED OFF ANY PROBLEMS, HOW DIFFICULT HAVE THESE PROBLEMS MADE IT FOR YOU TO DO YOUR WORK, TAKE CARE OF THINGS AT HOME, OR GET ALONG WITH OTHER PEOPLE: 2
8. MOVING OR SPEAKING SO SLOWLY THAT OTHER PEOPLE COULD HAVE NOTICED. OR THE OPPOSITE, BEING SO FIGETY OR RESTLESS THAT YOU HAVE BEEN MOVING AROUND A LOT MORE THAN USUAL: 3

## 2022-07-07 ASSESSMENT — ENCOUNTER SYMPTOMS
VOMITING: 0
NAUSEA: 0
TROUBLE SWALLOWING: 0
PHOTOPHOBIA: 0
VOICE CHANGE: 0
WHEEZING: 1
ABDOMINAL PAIN: 0

## 2022-07-07 ASSESSMENT — SOCIAL DETERMINANTS OF HEALTH (SDOH): HOW HARD IS IT FOR YOU TO PAY FOR THE VERY BASICS LIKE FOOD, HOUSING, MEDICAL CARE, AND HEATING?: NOT HARD AT ALL

## 2022-07-07 NOTE — PROGRESS NOTES
Shahla Rodriguez  1985  female  40 y.o. SUBJECTIVE:       Chief Complaint   Patient presents with    Follow-up     med refill       HPI:  Follow-up visit for chronic problems. Patient could not keep appointment as advised since last visit as she is struggling with her insurance coverage. She is out of all of her asthma medicine anxiety depression medicine cholesterol medicine. She reports only physician she is seeing now the dermatologist for hidradenitis suppurativa. History of chronic back pain. No longer following with Dr. Hugh Uribe. History of anxiety depression. PHQ-9 score today is 15. Patient denies any suicidal homicidal ideation  She did not have follow-up test for adrenal nodules. History of questionable small arteriovenous malformation in the right thigh from previous CTA. She still get occasional pain at the right upper and lateral thigh. Patient reports she is disabled for multiple reasons. She is stays home most of the time.     Past Medical History:   Diagnosis Date    Asthma     Depression     HS (hereditary spherocytosis) (HCC)     HTN (hypertension)     Thyroid disease      Past Surgical History:   Procedure Laterality Date     SECTION      OTHER SURGICAL HISTORY Right 2018    EXCISION OF SEBACEOUS CYST OF BACK UPPER NECK    PAIN MANAGEMENT PROCEDURE Right 2021    RIGHT L3 TRANSFORAMINAL EPIDURAL STEROID INJECTION WITH FLUOROSCOPY performed by Ken Oconnor MD at [place] [address] History     Socioeconomic History    Marital status: Single     Spouse name: None    Number of children: 1    Years of education: None    Highest education level: None   Occupational History    Occupation: / mobile phone expert     Comment: [employer]/on [employer] 12 hr days   Tobacco Use    Smoking status: Current Every Day Smoker     Packs/day: 0.10     Types: Cigarettes     Start date: 1998    Smokeless tobacco: Never Used   Vaping Use    Vaping Use: Unknown   Substance and Sexual Activity    Alcohol use: Yes    Drug use: No    Sexual activity: Yes     Partners: Male     Birth control/protection: I.U.D. Other Topics Concern    None   Social History Narrative    None     Social Determinants of Health     Financial Resource Strain: Low Risk     Difficulty of Paying Living Expenses: Not hard at all   Food Insecurity: No Food Insecurity    Worried About Running Out of Food in the Last Year: Never true    Kian of Food in the Last Year: Never true   Transportation Needs:     Lack of Transportation (Medical): Not on file    Lack of Transportation (Non-Medical): Not on file   Physical Activity:     Days of Exercise per Week: Not on file    Minutes of Exercise per Session: Not on file   Stress:     Feeling of Stress : Not on file   Social Connections:     Frequency of Communication with Friends and Family: Not on file    Frequency of Social Gatherings with Friends and Family: Not on file    Attends Temple Services: Not on file    Active Member of [address] [place] [address] or Organizations: Not on file    Attends Club or Organization Meetings: Not on file    Marital Status: Not on file   Intimate Partner Violence:     Fear of Current or Ex-Partner: Not on file    Emotionally Abused: Not on file    Physically Abused: Not on file    Sexually Abused: Not on file   Housing Stability:     Unable to Pay for Housing in the Last Year: Not on file    Number of [place] in the Last Year: Not on file    Unstable Housing in the Last Year: Not on file     Family History   Adopted: Yes   Family history unknown: Yes       Review of Systems   Constitutional: Negative for appetite change, diaphoresis and unexpected weight change. HENT: Negative for trouble swallowing and voice change. Eyes: Negative for photophobia and visual disturbance. Respiratory: Positive for wheezing.          Occasional wheezing and cough   Cardiovascular: Negative for chest pain and palpitations. Gastrointestinal: Negative for abdominal pain, nausea and vomiting. Genitourinary: Negative for difficulty urinating, flank pain and frequency. Musculoskeletal:        Chronic back pain. Neurological: Negative for dizziness and light-headedness. Psychiatric/Behavioral: Negative for hallucinations and suicidal ideas. OBJECTIVE:  Pulse Readings from Last 4 Encounters:   07/07/22 79   10/13/21 83   07/22/21 94   05/20/21 74     Wt Readings from Last 4 Encounters:   07/07/22 218 lb 9.6 oz (99.2 kg)   10/13/21 214 lb (97.1 kg)   07/22/21 228 lb 3.2 oz (103.5 kg)   05/20/21 232 lb (105.2 kg)     BP Readings from Last 4 Encounters:   07/07/22 120/86   10/13/21 122/82   07/22/21 132/86   05/20/21 98/72     Physical Exam  Vitals and nursing note reviewed. Constitutional:       Appearance: She is obese. She is not ill-appearing. Eyes:      General: No scleral icterus. Conjunctiva/sclera: Conjunctivae normal.   Cardiovascular:      Rate and Rhythm: Normal rate and regular rhythm. Pulses: Normal pulses. Heart sounds: Normal heart sounds. Pulmonary:      Effort: Pulmonary effort is normal.      Breath sounds: Wheezing present. Comments: Bilateral few scattered wheezing. Good air entry  Abdominal:      General: Bowel sounds are normal.   Musculoskeletal:      Right lower leg: No edema. Left lower leg: No edema. Skin:     Comments: Superficial varicose vein of the right lateral and anterior thigh   Neurological:      General: No focal deficit present. Mental Status: She is alert and oriented to person, place, and time.    Psychiatric:         Mood and Affect: Mood normal.         Behavior: Behavior normal.         CBC:   Lab Results   Component Value Date/Time    WBC 10.4 10/13/2021 09:44 AM    HGB 15.5 10/13/2021 09:44 AM    HCT 46.6 10/13/2021 09:44 AM     10/13/2021 09:44 AM     CMP:  Lab Results   Component Value Date/Time     10/13/2021 09:44 AM K 4.4 10/13/2021 09:44 AM     10/13/2021 09:44 AM    CO2 19 10/13/2021 09:44 AM    ANIONGAP 17 10/13/2021 09:44 AM    GLUCOSE 95 10/13/2021 09:44 AM    BUN 7 10/13/2021 09:44 AM    CREATININE 0.7 10/13/2021 09:44 AM    GFRAA >60 10/13/2021 09:44 AM    CALCIUM 9.9 10/13/2021 09:44 AM    PROT 7.5 10/13/2021 09:44 AM    LABALBU 4.6 10/13/2021 09:44 AM    AGRATIO 1.5 01/15/2020 11:58 AM    BILITOT 0.4 10/13/2021 09:44 AM    ALKPHOS 70 10/13/2021 09:44 AM    ALT 28 10/13/2021 09:44 AM    AST 20 10/13/2021 09:44 AM    GLOB 3.0 01/15/2020 11:58 AM     URINALYSIS:  Lab Results   Component Value Date/Time    GLUCOSEU neg 05/02/2018 09:04 AM    GLUCOSEU Negative 01/15/2018 02:03 PM    KETUA neg 05/02/2018 09:04 AM    KETUA Negative 01/15/2018 02:03 PM    SPECGRAV 1.020 05/02/2018 09:04 AM    SPECGRAV 1.022 01/15/2018 02:03 PM    BLOODU trace-lysed 05/02/2018 09:04 AM    BLOODU Negative 01/15/2018 02:03 PM    PHUR 7.0 05/02/2018 09:04 AM    PHUR 6.0 01/15/2018 02:03 PM    PROTEINU neg 05/02/2018 09:04 AM    PROTEINU Negative 01/15/2018 02:03 PM    NITRU POSITIVE 01/15/2018 02:03 PM    LEUKOCYTESUR trace 05/02/2018 09:04 AM    LEUKOCYTESUR TRACE 01/15/2018 02:03 PM    LABMICR YES 01/15/2018 02:03 PM    URINETYPE Voided 01/15/2018 02:03 PM     MICRO/ALB:   Lab Results   Component Value Date/Time    LABMICR YES 01/15/2018 02:03 PM     LIPID:  Lab Results   Component Value Date/Time    CHOL 154 10/22/2020 07:47 AM    TRIG 157 10/22/2020 07:47 AM    HDL 52 10/13/2021 09:44 AM    LDLCALC 110 10/13/2021 09:44 AM    LABVLDL 30 10/13/2021 09:44 AM     TSH:   Lab Results   Component Value Date/Time    TSHREFLEX 2.77 01/15/2018 01:49 PM         ASSESSMENT/PLAN:  Patient will have lab and follow-up testing done. She will reestablish appointment with Dr. Soumya Jarvis, rheumatologist,  Refill of multiple medication. Assessment/Plan:  Crissy Dye was seen today for follow-up.     Diagnoses and all orders for this visit:    Depression, unspecified depression type  Not in remission as patient is out off her medications. No suicidal homicidal ideations  -     CBC; Future  -     FLUoxetine (PROZAC) 20 MG capsule; TAKE ONE (1) CAPSULE BY MOUTH ONCE DAILY    Sicca complex (HCC)  Patient continued to complain of dry mouth and dry eyes. She will make follow-up appointment with rheumatologist  Moderate persistent asthma with exacerbation  Albuterol as needed  -     fluticasone-salmeterol (ADVAIR) 250-50 MCG/ACT AEPB diskus inhaler; Inhale 1 puff into the lungs every 12 hours    Left adrenal mass (HCC)  -     CT ABDOMEN PELVIS WO CONTRAST Additional Contrast? 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 We will get follow-up test to see the stability of the atrial venous malformation  Which was very mild in the past  -     CTA LOWER EXTREMITY RIGHT W CONTRAST; Future    Familial hypercholesterolemia  -     CBC; Future  -     Comprehensive Metabolic Panel; Future  -     TSH with Reflex; Future  -     Lipid Panel; Future      Primary insomnia  As needed-     hydrOXYzine HCl (ATARAX) 25 MG tablet; TAKE 1 TABLET BY MOUTH EVERY 8 HOURS AS NEEDED FOR ANXIETY    Chronic midline low back pain with bilateral sciatica  -     tiZANidine (ZANAFLEX) 4 MG tablet; TAKE 1 TABLET BY MOUTH 3 TIMES DAILY    Moderate persistent asthma without complication  -     albuterol sulfate HFA (PROVENTIL;VENTOLIN;PROAIR) 108 (90 Base) MCG/ACT inhaler; Inhale 2 puffs into the lungs every 6 hours as needed for Wheezing    Hyperlipidemia, unspecified hyperlipidemia type  Currently off medication  -     atorvastatin (LIPITOR) 20 MG tablet; Take 1 tablet by mouth daily    Vitamin D deficiency  -     Cholecalciferol (VITAMIN D3) 1.25 MG (41917 UT) CAPS;  Take 50,000 capsules by mouth once a week    Arthralgia of multiple joints  -     naproxen (NAPROSYN) 500 MG tablet; Take 1 tablet by mouth 2 times daily as needed for Pain    Chronic back pain greater than 3 months duration  -     naproxen (NAPROSYN) 500 MG tablet; Take 1 tablet by mouth 2 times daily as needed for Pain    Skin rash  -     nystatin (MYCOSTATIN) 395640 UNIT/GM powder; Apply topically 4 times daily Apply topically 4 times daily. Candidal intertrigo  -     nystatin (MYCOSTATIN) 809565 UNIT/GM powder; Apply topically 4 times daily Apply topically 4 times daily. Other orders  -     Discontinue: spironolactone (ALDACTONE) 50 MG tablet;  Take 1 tablet by mouth 2 times daily            Orders Placed This Encounter   Procedures    CT ABDOMEN PELVIS WO CONTRAST Additional Contrast? None     Standing Status:   Future     Standing Expiration Date:   7/7/2023     Order Specific Question:   Additional Contrast?     Answer:   None    CTA LOWER EXTREMITY RIGHT W CONTRAST     Standing Status:   Future     Standing Expiration Date:   7/7/2023     Order Specific Question:   STAT Creatinine as needed:     Answer:   No    CBC     Standing Status:   Future     Standing Expiration Date:   7/7/2023    Comprehensive Metabolic Panel     Standing Status:   Future     Standing Expiration Date:   7/7/2023    TSH with Reflex     Standing Status:   Future     Standing Expiration Date:   7/7/2023    Lipid Panel     Standing Status:   Future     Standing Expiration Date:   7/7/2023     Order Specific Question:   Is Patient Fasting?/# of Hours     Answer:   10    Urinalysis     Standing Status:   Future     Standing Expiration Date:   7/7/2023     Current Outpatient Medications   Medication Sig Dispense Refill    hydrOXYzine HCl (ATARAX) 25 MG tablet TAKE 1 TABLET BY MOUTH EVERY 8 HOURS AS NEEDED FOR ANXIETY 90 tablet 2    tiZANidine (ZANAFLEX) 4 MG tablet TAKE 1 TABLET BY MOUTH 3 TIMES DAILY 90 tablet 2    albuterol sulfate HFA (PROVENTIL;VENTOLIN;PROAIR) 108 (90 Base) MCG/ACT inhaler Inhale 2 puffs into the lungs every 6 hours as needed for Wheezing 3 each 0    FLUoxetine (PROZAC) 20 MG capsule TAKE ONE (1) CAPSULE BY MOUTH ONCE DAILY 90 capsule 0    atorvastatin (LIPITOR) 20 MG tablet Take 1 tablet by mouth daily 90 tablet 0    Cholecalciferol (VITAMIN D3) 1.25 MG (34220 UT) CAPS Take 50,000 capsules by mouth once a week 12 capsule 0    naproxen (NAPROSYN) 500 MG tablet Take 1 tablet by mouth 2 times daily as needed for Pain 180 tablet 1    nystatin (MYCOSTATIN) 823860 UNIT/GM powder Apply topically 4 times daily Apply topically 4 times daily. 3 each 0    fluticasone-salmeterol (ADVAIR) 250-50 MCG/ACT AEPB diskus inhaler Inhale 1 puff into the lungs every 12 hours 3 each 0    fluticasone-salmeterol (ADVAIR) 250-50 MCG/DOSE AEPB INHALE ONE (1) PUFF BY MOUTH TWICE DAILY 1 each 2    melatonin 3 MG TABS tablet Take 1 tablet by mouth daily 90 tablet 1    clindamycin (CLINDAGEL) 1 % gel Apply topically to affected areas twice a day      BENZOYL PEROXIDE 5 % external wash Apply 142 g topically daily      Levonorgestrel (MIRENA IU) by Intrauterine route.  adalimumab (HUMIRA PEN-PS/UV/ADOL HS START) 40 MG/0.8ML injection Inject 80 mg into the skin once for 1 dose Inject 80 mg SC x 3 doses on days 1, 2, 15, then 40 mg SC qwk on day 29. 2 each 0     No current facility-administered medications for this visit. Return in about 4 weeks (around 8/4/2022). For chronic problems. An After Visit Summary was printed and given to the patient. Documentation was done using voice recognition dragon software. Every effort was made to ensure accuracy; however, inadvertent  Unintentional computerized transcription errors may be present.

## 2022-08-08 ENCOUNTER — OFFICE VISIT (OUTPATIENT)
Dept: INTERNAL MEDICINE CLINIC | Age: 37
End: 2022-08-08
Payer: COMMERCIAL

## 2022-08-08 VITALS
BODY MASS INDEX: 44.35 KG/M2 | HEART RATE: 83 BPM | DIASTOLIC BLOOD PRESSURE: 72 MMHG | OXYGEN SATURATION: 97 % | SYSTOLIC BLOOD PRESSURE: 98 MMHG | HEIGHT: 59 IN | WEIGHT: 220 LBS

## 2022-08-08 DIAGNOSIS — J45.41 MODERATE PERSISTENT ASTHMA WITH EXACERBATION: Primary | ICD-10-CM

## 2022-08-08 PROCEDURE — 99213 OFFICE O/P EST LOW 20 MIN: CPT | Performed by: INTERNAL MEDICINE

## 2022-08-08 NOTE — PROGRESS NOTES
Shonda Westfall  1985  female  40 y.o. SUBJECTIVE:       Chief Complaint   Patient presents with    Follow-up     1 month    Other     Pt states that she has not ecvd any of her medications from [facility]--I called them---they state that they have been trying to reach her since . They have insurance questions. Will give her their phone #--she needs to call. HPI:  Follow-up visit. Patient report her pharmacy has not filling her medication. Currently she is off all the medications. She has been complaining of occasional wheezing and shortness of breath. Patient continues to smoke cigarettes. My nurse did contact the pharmacy and verified that pharmacist has been unable to  reach patient and not responding to  their call l to clarify insurance coverage. Patient has been declining preventive vaccinations  all alone.     Past Medical History:   Diagnosis Date    Asthma     Depression     HS (hereditary spherocytosis) (HCC)     HTN (hypertension)     Thyroid disease      Past Surgical History:   Procedure Laterality Date     SECTION      OTHER SURGICAL HISTORY Right 2018    EXCISION OF SEBACEOUS CYST OF BACK UPPER NECK    PAIN MANAGEMENT PROCEDURE Right 2021    RIGHT L3 TRANSFORAMINAL EPIDURAL STEROID INJECTION WITH FLUOROSCOPY performed by Martinez Ramos MD at [place] [address] History     Socioeconomic History    Marital status: Single     Spouse name: None    Number of children: 1    Years of education: None    Highest education level: None   Occupational History    Occupation: / mobile phone expert     Comment: [employer]/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 Social Determinants of Health     Financial Resource Strain: Low Risk     Difficulty of Paying Living Expenses: Not hard at all   Food Insecurity: No Food Insecurity    Worried About Running Out of Food in the Last Year: Never true    Ran Out of Food in the Last Year: Never true     Family History   Adopted: Yes   Family history unknown: Yes       Review of Systems   Constitutional:  Negative for activity change and diaphoresis. Cardiovascular:  Negative for chest pain and palpitations. Neurological:  Negative for dizziness and light-headedness. OBJECTIVE:  Pulse Readings from Last 4 Encounters:   08/08/22 83   07/07/22 79   10/13/21 83   07/22/21 94     Wt Readings from Last 4 Encounters:   08/08/22 220 lb (99.8 kg)   07/07/22 218 lb 9.6 oz (99.2 kg)   10/13/21 214 lb (97.1 kg)   07/22/21 228 lb 3.2 oz (103.5 kg)     BP Readings from Last 4 Encounters:   08/08/22 98/72   07/07/22 120/86   10/13/21 122/82   07/22/21 132/86     Physical Exam  Vitals and nursing note reviewed. Cardiovascular:      Rate and Rhythm: Normal rate and regular rhythm. Pulses: Normal pulses. Heart sounds: Normal heart sounds. Pulmonary:      Effort: Pulmonary effort is normal.      Breath sounds: Normal breath sounds. No wheezing or rhonchi. Musculoskeletal:      Right lower leg: No edema. Left lower leg: No edema. Neurological:      General: No focal deficit present. Mental Status: She is oriented to person, place, and time.        CBC:   Lab Results   Component Value Date/Time    WBC 10.4 10/13/2021 09:44 AM    HGB 15.5 10/13/2021 09:44 AM    HCT 46.6 10/13/2021 09:44 AM     10/13/2021 09:44 AM     CMP:  Lab Results   Component Value Date/Time     10/13/2021 09:44 AM    K 4.4 10/13/2021 09:44 AM     10/13/2021 09:44 AM    CO2 19 10/13/2021 09:44 AM    ANIONGAP 17 10/13/2021 09:44 AM    GLUCOSE 95 10/13/2021 09:44 AM    BUN 7 10/13/2021 09:44 AM    CREATININE 0.7 10/13/2021 09:44 AM    GFRAA >60 10/13/2021 09:44 AM    CALCIUM 9.9 10/13/2021 09:44 AM    PROT 7.5 10/13/2021 09:44 AM    LABALBU 4.6 10/13/2021 09:44 AM    AGRATIO 1.5 01/15/2020 11:58 AM    BILITOT 0.4 10/13/2021 09:44 AM    ALKPHOS 70 10/13/2021 09:44 AM    ALT 28 10/13/2021 09:44 AM    AST 20 10/13/2021 09:44 AM    GLOB 3.0 01/15/2020 11:58 AM     URINALYSIS:  Lab Results   Component Value Date/Time    GLUCOSEU neg 05/02/2018 09:04 AM    GLUCOSEU Negative 01/15/2018 02:03 PM    KETUA neg 05/02/2018 09:04 AM    KETUA Negative 01/15/2018 02:03 PM    SPECGRAV 1.020 05/02/2018 09:04 AM    SPECGRAV 1.022 01/15/2018 02:03 PM    BLOODU trace-lysed 05/02/2018 09:04 AM    BLOODU Negative 01/15/2018 02:03 PM    PHUR 7.0 05/02/2018 09:04 AM    PHUR 6.0 01/15/2018 02:03 PM    PROTEINU neg 05/02/2018 09:04 AM    PROTEINU Negative 01/15/2018 02:03 PM    NITRU POSITIVE 01/15/2018 02:03 PM    LEUKOCYTESUR trace 05/02/2018 09:04 AM    LEUKOCYTESUR TRACE 01/15/2018 02:03 PM    LABMICR YES 01/15/2018 02:03 PM    URINETYPE Voided 01/15/2018 02:03 PM       MICRO/ALB:   Lab Results   Component Value Date/Time    LABMICR YES 01/15/2018 02:03 PM     LIPID:  Lab Results   Component Value Date/Time    CHOL 154 10/22/2020 07:47 AM    TRIG 157 10/22/2020 07:47 AM    HDL 52 10/13/2021 09:44 AM    LDLCALC 110 10/13/2021 09:44 AM    LABVLDL 30 10/13/2021 09:44 AM     TSH:   Lab Results   Component Value Date/Time    TSHREFLEX 2.77 01/15/2018 01:49 PM         ASSESSMENT/PLAN:    Since last visit patient have not done the lab work as well as other testings. She reports she is a scheduled to have CT scan next week and will get all the blood work. We have given patient pharmacy phone number to clarify and get her routine medications as soon as possible. 1. Moderate persistent asthma with exacerbation      Does not appear to be in any active distress. Advised medication compliance.       No orders of the defined types were placed in this encounter. Current Outpatient Medications   Medication Sig Dispense Refill    hydrOXYzine HCl (ATARAX) 25 MG tablet TAKE 1 TABLET BY MOUTH EVERY 8 HOURS AS NEEDED FOR ANXIETY (Patient not taking: Reported on 8/8/2022) 90 tablet 2    tiZANidine (ZANAFLEX) 4 MG tablet TAKE 1 TABLET BY MOUTH 3 TIMES DAILY (Patient not taking: Reported on 8/8/2022) 90 tablet 2    albuterol sulfate HFA (PROVENTIL;VENTOLIN;PROAIR) 108 (90 Base) MCG/ACT inhaler Inhale 2 puffs into the lungs every 6 hours as needed for Wheezing (Patient not taking: Reported on 8/8/2022) 3 each 0    FLUoxetine (PROZAC) 20 MG capsule TAKE ONE (1) CAPSULE BY MOUTH ONCE DAILY (Patient not taking: Reported on 8/8/2022) 90 capsule 0    atorvastatin (LIPITOR) 20 MG tablet Take 1 tablet by mouth daily (Patient not taking: Reported on 8/8/2022) 90 tablet 0    Cholecalciferol (VITAMIN D3) 1.25 MG (59980 UT) CAPS Take 50,000 capsules by mouth once a week (Patient not taking: Reported on 8/8/2022) 12 capsule 0    naproxen (NAPROSYN) 500 MG tablet Take 1 tablet by mouth 2 times daily as needed for Pain (Patient not taking: Reported on 8/8/2022) 180 tablet 1    nystatin (MYCOSTATIN) 266128 UNIT/GM powder Apply topically 4 times daily Apply topically 4 times daily.  (Patient not taking: Reported on 8/8/2022) 3 each 0    fluticasone-salmeterol (ADVAIR) 250-50 MCG/ACT AEPB diskus inhaler Inhale 1 puff into the lungs every 12 hours (Patient not taking: Reported on 8/8/2022) 3 each 0    melatonin 3 MG TABS tablet Take 1 tablet by mouth daily (Patient not taking: Reported on 8/8/2022) 90 tablet 1    clindamycin (CLINDAGEL) 1 % gel Apply topically to affected areas twice a day (Patient not taking: Reported on 8/8/2022)      BENZOYL PEROXIDE 5 % external wash Apply 142 g topically daily (Patient not taking: Reported on 8/8/2022)      adalimumab (HUMIRA PEN-PS/UV/ADOL HS START) 40 MG/0.8ML injection Inject 80 mg into the skin once for 1 dose Inject 80 mg SC x 3 doses on days 1,

## 2022-08-16 DIAGNOSIS — F51.01 PRIMARY INSOMNIA: ICD-10-CM

## 2022-08-16 RX ORDER — LANOLIN ALCOHOL/MO/W.PET/CERES
3 CREAM (GRAM) TOPICAL DAILY
Qty: 30 TABLET | Refills: 10 | Status: SHIPPED | OUTPATIENT
Start: 2022-08-16

## 2022-08-31 ENCOUNTER — TELEPHONE (OUTPATIENT)
Dept: INTERNAL MEDICINE CLINIC | Age: 37
End: 2022-08-31

## 2022-08-31 NOTE — TELEPHONE ENCOUNTER
----- Message from Ellis Graf sent at 8/31/2022 10:13 AM EDT -----  Subject: Message to Provider    QUESTIONS  Information for Provider? Patient is requesting a prescription for renewal   of her handicap placard. Patient will pick it up. Please call to let her   know when it is ready. ---------------------------------------------------------------------------  --------------  Steffany Duncan QUX  [phone]; OK to leave message on voicemail  ---------------------------------------------------------------------------  --------------  SCRIPT ANSWERS  Relationship to Patient?  Self

## 2022-09-02 ENCOUNTER — HOSPITAL ENCOUNTER (OUTPATIENT)
Dept: CT IMAGING | Age: 37
Discharge: HOME | End: 2022-09-02
Payer: COMMERCIAL

## 2022-09-02 DIAGNOSIS — Q27.32: ICD-10-CM

## 2022-09-02 DIAGNOSIS — E27.8 LEFT ADRENAL MASS (HCC): ICD-10-CM

## 2022-09-02 PROCEDURE — 74176 CT ABD & PELVIS W/O CONTRAST: CPT

## 2022-09-02 PROCEDURE — 73706 CT ANGIO LWR EXTR W/O&W/DYE: CPT

## 2022-09-02 PROCEDURE — 6360000004 HC RX CONTRAST MEDICATION: Performed by: INTERNAL MEDICINE

## 2022-09-02 RX ADMIN — IOPAMIDOL 75 ML: 755 INJECTION, SOLUTION INTRAVENOUS at 17:09

## 2022-09-04 PROBLEM — Q27.32: Status: RESOLVED | Noted: 2020-10-28 | Resolved: 2022-09-04

## 2022-09-04 NOTE — RESULT ENCOUNTER NOTE
Benign adrenal adenoma stable for the at least last 5 years. Incidentally noted 1 mm left renal stone. No further work-up or testing needed. Increase hydration. CTA of the lower extremity  Failed to show any significant abnormality other than minor varicose vein.   No further testing needed

## 2022-09-06 NOTE — PROGRESS NOTES
PATIENT REACHED   YES____NO__X__    PREOP INSTRUCTIONS LEFT ON VM NUMBER_______________      DATE_9-15-22________ TIME__1415_______ARRIVAL__1315______PLACE___[facility]_________  NOTHING TO EAT OR DRINK  AFTER MIDNIGHT THE EVENING PRIOR OR AS INSTRUCTED BY YOUR DR.  Olimpia Matos NEED A RESPONSIBLE ADULT AGE 18 OR OLDER TO DRIVE YOU HOME  PLEASE BRING INSURANCE CARD. PICTURE ID AND COMPLETE LIST OF MEDS  WEAR LOOSE COMFORTABLE CLOTHING  FOLLOW ANY INSTRUCTIONS YOUR DRS OFFICE HAS GIVEN YOU,INCLUDING WHAT MEDICATIONS TO TAKE THE AM OF PROCEDURE AND WHEN AND IF YOU NEED TO STOP ANY BLOOD THINNERS. IF YOU HAVE QUESTIONS REGARDING THIS CALL THE OFFICE  THE GOAL BLOOD SUGAR THE AM OF PROCEDURE  OR LESS ABOVE THAT THE PROCEDURE MAY BE CANCELLED  ANY QUESTIONS CALL YOUR DOCTOR. ALSO,PLEASE READ THE INSTRUCTION PACKET FROM YOUR DR IF YOU RECEIVED ONE. SPINE INTERVENTION NUMBER [phone]      OTHER___________________________________      VISITOR POLICY(subject to change)    Current policy is 2 visitors per patient. No children. Masks are required.

## 2022-09-15 ENCOUNTER — HOSPITAL ENCOUNTER (OUTPATIENT)
Age: 37
Setting detail: OUTPATIENT SURGERY
Discharge: HOME | End: 2022-09-15
Attending: PHYSICAL MEDICINE & REHABILITATION | Admitting: PHYSICAL MEDICINE & REHABILITATION
Payer: COMMERCIAL

## 2022-09-15 ENCOUNTER — APPOINTMENT (OUTPATIENT)
Dept: GENERAL RADIOLOGY | Age: 37
End: 2022-09-15
Attending: PHYSICAL MEDICINE & REHABILITATION
Payer: COMMERCIAL

## 2022-09-15 VITALS
RESPIRATION RATE: 16 BRPM | SYSTOLIC BLOOD PRESSURE: 115 MMHG | BODY MASS INDEX: 43.34 KG/M2 | HEIGHT: 59 IN | DIASTOLIC BLOOD PRESSURE: 77 MMHG | TEMPERATURE: 97.6 F | HEART RATE: 72 BPM | OXYGEN SATURATION: 100 % | WEIGHT: 215 LBS

## 2022-09-15 PROCEDURE — 2500000003 HC RX 250 WO HCPCS: Performed by: PHYSICAL MEDICINE & REHABILITATION

## 2022-09-15 PROCEDURE — 3610000056 HC PAIN LEVEL 4 BASE (NON-OR): Performed by: PHYSICAL MEDICINE & REHABILITATION

## 2022-09-15 PROCEDURE — 6360000002 HC RX W HCPCS: Performed by: PHYSICAL MEDICINE & REHABILITATION

## 2022-09-15 PROCEDURE — 99152 MOD SED SAME PHYS/QHP 5/>YRS: CPT | Performed by: PHYSICAL MEDICINE & REHABILITATION

## 2022-09-15 PROCEDURE — 2709999900 HC NON-CHARGEABLE SUPPLY: Performed by: PHYSICAL MEDICINE & REHABILITATION

## 2022-09-15 PROCEDURE — 77003 FLUOROGUIDE FOR SPINE INJECT: CPT

## 2022-09-15 RX ORDER — LIDOCAINE HYDROCHLORIDE 10 MG/ML
INJECTION, SOLUTION EPIDURAL; INFILTRATION; INTRACAUDAL; PERINEURAL
Status: COMPLETED | OUTPATIENT
Start: 2022-09-15 | End: 2022-09-15

## 2022-09-15 RX ORDER — TRAZODONE HYDROCHLORIDE 50 MG/1
50 TABLET ORAL NIGHTLY
COMMUNITY
End: 2022-10-05

## 2022-09-15 RX ORDER — MIDAZOLAM HYDROCHLORIDE 1 MG/ML
INJECTION INTRAMUSCULAR; INTRAVENOUS
Status: COMPLETED | OUTPATIENT
Start: 2022-09-15 | End: 2022-09-15

## 2022-09-15 RX ORDER — FENTANYL CITRATE 50 UG/ML
INJECTION, SOLUTION INTRAMUSCULAR; INTRAVENOUS
Status: COMPLETED | OUTPATIENT
Start: 2022-09-15 | End: 2022-09-15

## 2022-09-15 RX ORDER — DEXAMETHASONE SODIUM PHOSPHATE 10 MG/ML
INJECTION, SOLUTION INTRAMUSCULAR; INTRAVENOUS
Status: COMPLETED | OUTPATIENT
Start: 2022-09-15 | End: 2022-09-15

## 2022-09-15 ASSESSMENT — PAIN DESCRIPTION - DESCRIPTORS: DESCRIPTORS: OTHER (COMMENT)

## 2022-09-15 ASSESSMENT — PAIN SCALES - GENERAL
PAINLEVEL_OUTOF10: 0
PAINLEVEL_OUTOF10: 0

## 2022-09-15 ASSESSMENT — PAIN - FUNCTIONAL ASSESSMENT: PAIN_FUNCTIONAL_ASSESSMENT: 0-10

## 2022-09-15 NOTE — OP NOTE
PATIENT:  Radha Sarmiento  AGE:  40 yrs  MEDICAL RECORD #:  [id number]  YOB: 1985     DATE:  9/15/2022  PHYSICIAN: Daniel Starr M.D. PROCEDURE: Right L3 transforaminal epidural steroid injection under fluoroscopy. PRE-OP DIAGNOSIS:  Low Back Pain/Radiculopathy     POST-OP DIAGNOSIS:  same     HISTORY OF PRESENT ILLNESS:  See office notes. Patient has failed previous less-invasive treatments. ALLERGIES:  Chantix [varenicline] and Seroquel [quetiapine fumarate]     MEDICATIONS:    No current facility-administered medications for this encounter. PHYSICAL EXAMINATION:              General:  Awake, alert              Heart:  No audible murmurs, extremities well perfused              Lungs:  No increased WOB or audible wheezing              Extremities:  Normal tone. Warm. No swelling. Anesthesia: 1 mg Versed and 50 mcg fentanyl    Estimated blood loss: None    DESCRIPTION OF PROCEDURE:     Components of the procedure were again reviewed with the patient prior to the procedure. She is aware of risks including infection, bleeding, allergic reaction, and nerve injury. She had ample opportunity for additional questions. She elected to proceed with treatment. The patient was placed in the prone position. Cardiovascular monitoring was initiated, and vital signs were stable prior to, during, and after the procedure. Utilizing fluoroscopy, the L3 vertebrae was identified. The area was sterilely prepped and draped. Skin anesthesia was achieved at each entry site using 1-2 cc of Lidocaine 1%. A 22 g 5.0 inch spinal needle was slowly inserted into the right L3 neuroforamen using AP, lateral and oblique fluoroscopic imaging. Negative aspiration was confirmed. 1 cc Isovue-M 300 was injected at each site showing contrast spread into the epidural space and along the nerve root. A combination of 1 cc Lidocaine 1% and 10 mg dexamethasone were slowly injected at each site.  The needles were removed after the stylets were repositioned. A sterile bandage was applied. The patient was brought to recovery in stable condition. The patient tolerated the procedure well. DISPOSITION:  The patient was transported to recovery. The patient was monitored for 15 to 20 minutes post-procedure. Precautions were discussed and written instructions provided. Comment: Good epidural and NR flow.

## 2022-09-15 NOTE — H&P
HISTORY AND PHYSICAL/PRE-SEDATION ASSESSMENT    Patient:  Francisco Walker   :  1985  Medical Record No.:  [id number]   Date:  9/15/2022  Physician:  Chao Murray MD  Facility: [address] [place] [address] Drive:                 The patient is a 40 y.o. female whom presents with leg pain. Review of the imaging and physical exam of the patient confirmed the pre-procedure diagnosis. After a thorough discussion of risks, benefits and alternatives informed consent was obtained. Diagnosis:  M54.16    Past Medical History:   Past Medical History:   Diagnosis Date    Asthma     Depression     HS (hereditary spherocytosis) ([facility] [address]     HTN (hypertension)     Thyroid disease         Past Surgical History:     Past Surgical History:   Procedure Laterality Date     SECTION      OTHER SURGICAL HISTORY Right 2018    EXCISION OF SEBACEOUS CYST OF BACK UPPER NECK    PAIN MANAGEMENT PROCEDURE Right 2021    RIGHT L3 TRANSFORAMINAL EPIDURAL STEROID INJECTION WITH FLUOROSCOPY performed by Chao Murray MD at [facility]       Current Medications:   Prior to Admission medications    Medication Sig Start Date End Date Taking?  Authorizing Provider   traZODone (DESYREL) 50 MG tablet Take 50 mg by mouth nightly Pt unsure of dose   Yes Historical Provider, MD   melatonin 3 MG TABS tablet TAKE 1 TABLET BY MOUTH DAILY 22   Filiberto Ramos MD   hydrOXYzine HCl (ATARAX) 25 MG tablet TAKE 1 TABLET BY MOUTH EVERY 8 HOURS AS NEEDED FOR ANXIETY  Patient not taking: Reported on 2022   Davidson Fierro MD   tiZANidine (ZANAFLEX) 4 MG tablet TAKE 1 TABLET BY MOUTH 3 TIMES DAILY  Patient not taking: Reported on 2022   Filiberto Ramos MD   albuterol sulfate HFA (PROVENTIL;VENTOLIN;PROAIR) 108 (90 Base) MCG/ACT inhaler Inhale 2 puffs into the lungs every 6 hours as needed for Wheezing  Patient not taking: Reported on 2022   Davidson Fierro MD FLUoxetine (PROZAC) 20 MG capsule TAKE ONE (1) CAPSULE BY MOUTH ONCE DAILY  Patient not taking: Reported on 8/8/2022 7/7/22   Elden Cheadle, MD   atorvastatin (LIPITOR) 20 MG tablet Take 1 tablet by mouth daily  Patient not taking: Reported on 8/8/2022 7/7/22   Elden Cheadle, MD   Cholecalciferol (VITAMIN D3) 1.25 MG (74311 UT) CAPS Take 50,000 capsules by mouth once a week  Patient not taking: Reported on 8/8/2022 7/7/22   Elden Cheadle, MD   naproxen (NAPROSYN) 500 MG tablet Take 1 tablet by mouth 2 times daily as needed for Pain  Patient not taking: Reported on 8/8/2022 7/7/22   Elden Cheadle, MD   nystatin (MYCOSTATIN) 467094 UNIT/GM powder Apply topically 4 times daily Apply topically 4 times daily. Patient not taking: Reported on 8/8/2022 7/7/22   Elden Cheadle, MD   fluticasone-salmeterol (ADVAIR) 250-50 MCG/ACT AEPB diskus inhaler Inhale 1 puff into the lungs every 12 hours  Patient not taking: Reported on 8/8/2022 7/7/22   Elden Cheadle, MD   clindamycin (CLINDAGEL) 1 % gel Apply topically to affected areas twice a day  Patient not taking: Reported on 8/8/2022 1/22/20   Historical Provider, MD   BENZOYL PEROXIDE 5 % external wash Apply 142 g topically daily  Patient not taking: Reported on 8/8/2022 12/31/19   Historical Provider, MD   adalimumab (HUMIRA PEN-PS/UV/ADOL HS START) 40 MG/0.8ML injection Inject 80 mg into the skin once for 1 dose Inject 80 mg SC x 3 doses on days 1, 2, 15, then 40 mg SC qwk on day 29. 1/15/20 10/13/21  Natali Daigle MD   Levonorgestrel (MIRENA IU) by Intrauterine route. Patient not taking: Reported on 8/8/2022    Historical Provider, MD       Allergies:  Chantix [varenicline] and Seroquel [quetiapine fumarate]    Social History:    reports that she has been smoking cigarettes. She started smoking about 24 years ago. She has been smoking an average of .1 packs per day. She has never used smokeless tobacco. She reports current alcohol use.  She reports that she does not use drugs. Family History:   Family History   Adopted: Yes   Family history unknown: Yes        Vitals: Blood pressure 121/83, pulse 75, temperature 97.6 °F (36.4 °C), temperature source Temporal, resp. rate 16, height 4' 11\" (1.499 m), weight 215 lb (97.5 kg), SpO2 95 %, not currently breastfeeding. PHYSICAL EXAM:  HENT: Airway patent and reviewed  Cardiovascular: Normal rate, regular rhythm, normal heart sounds. Pulmonary/Chest: No wheezes. No rhonchi. No rales. Abdominal: Soft. Bowel sounds are normal. No distension. Extremities: Moves all extremities equally  Lumbar Spine: Painful range of motion, no midline tenderness     ASA CLASS:         []   I. Normal, healthy adult           [x]   II.  Mild systemic disease            []   III. Severe systemic disease    Mallampati: Mallampati Class II - (soft palate, fauces & uvula are visible)      Sedation plan:   []  Local              [x]  Minimal                  []  General anesthesia    Treatment plan:  Patient's condition acceptable for planned procedure/sedation. Proceed with planned procedure   Post Procedure Plan   Return to same level of care   ______________________     The risks and benefits as well as alternatives to the procedure have been discussed with the patient and or family. The patient and/or next of kin understands and agrees to proceed.     Fidel Hernandez MD

## 2022-09-15 NOTE — DISCHARGE INSTRUCTIONS
DR. Lidya Esteban DISCHARGE INSTRUCTIONS           1. Do not drive today for ( 8 hours with no sedation)  (24 hours if had sedation)     2. If you received sedation during your procedure, be advised for the next 24 hour       Do not drink alcohol    Do not operate machinery    Do not make any important decisions or sign any legal documents    You may experience light headedness,dizziness or sleepiness     3. You may apply ice for 15 minutes 4-5 times a day as needed. 4. No heating pad for 48 hours     5. Follow up with the Provider who referred you. 6.Keep site dry for 24 hours after procedure, then remove bandaid. 7.It may take 24-48 hours to feel improvement. 8. Side effects of Steroids may include:      Elevated glucose levels ( in diabetics)    Fluid retention    Tenderness at site    Nervous energy    Sleeplessness    Muscle spasms    Facial flushing    Hot flashes     9. Call if:    Your symptoms worsen severely    You experience a severe headache that worsens when upright    You develop a fever greater than 101 degrees     ([phone])                             10. You may restart any blood thinning medications tomorrow.

## 2022-09-29 ENCOUNTER — TELEPHONE (OUTPATIENT)
Dept: INTERNAL MEDICINE CLINIC | Age: 37
End: 2022-09-29

## 2022-09-29 NOTE — TELEPHONE ENCOUNTER
[address] calling requesting refill of  Spironolactone     Last written   7/7/22  Last OV   8/8/22  Next OV  10/5/22  Last recommended OV   NA     [facility]

## 2022-10-05 ENCOUNTER — HOSPITAL ENCOUNTER (OUTPATIENT)
Age: 37
Discharge: HOME | End: 2022-10-05
Payer: COMMERCIAL

## 2022-10-05 ENCOUNTER — OFFICE VISIT (OUTPATIENT)
Dept: INTERNAL MEDICINE CLINIC | Age: 37
End: 2022-10-05
Payer: COMMERCIAL

## 2022-10-05 VITALS
SYSTOLIC BLOOD PRESSURE: 96 MMHG | OXYGEN SATURATION: 97 % | WEIGHT: 214 LBS | HEIGHT: 59 IN | DIASTOLIC BLOOD PRESSURE: 60 MMHG | BODY MASS INDEX: 43.14 KG/M2 | HEART RATE: 93 BPM

## 2022-10-05 DIAGNOSIS — F32.A DEPRESSION, UNSPECIFIED DEPRESSION TYPE: ICD-10-CM

## 2022-10-05 DIAGNOSIS — J45.41 MODERATE PERSISTENT ASTHMA WITH EXACERBATION: ICD-10-CM

## 2022-10-05 DIAGNOSIS — F51.01 PRIMARY INSOMNIA: ICD-10-CM

## 2022-10-05 DIAGNOSIS — E78.01 FAMILIAL HYPERCHOLESTEROLEMIA: ICD-10-CM

## 2022-10-05 DIAGNOSIS — E78.5 HYPERLIPIDEMIA, UNSPECIFIED HYPERLIPIDEMIA TYPE: ICD-10-CM

## 2022-10-05 DIAGNOSIS — E27.8 LEFT ADRENAL MASS (HCC): ICD-10-CM

## 2022-10-05 LAB
A/G RATIO: 1.5 (ref 1.1–2.2)
ALBUMIN SERPL-MCNC: 4.3 G/DL (ref 3.4–5)
ALP BLD-CCNC: 84 U/L (ref 40–129)
ALT SERPL-CCNC: 16 U/L (ref 10–40)
ANION GAP SERPL CALCULATED.3IONS-SCNC: 10 MMOL/L (ref 3–16)
AST SERPL-CCNC: 20 U/L (ref 15–37)
BILIRUB SERPL-MCNC: 0.4 MG/DL (ref 0–1)
BILIRUBIN URINE: NEGATIVE
BLOOD, URINE: NEGATIVE
BUN BLDV-MCNC: 7 MG/DL (ref 7–20)
CALCIUM SERPL-MCNC: 9 MG/DL (ref 8.3–10.6)
CHLORIDE BLD-SCNC: 103 MMOL/L (ref 99–110)
CHOLESTEROL, TOTAL: 160 MG/DL (ref 0–199)
CLARITY: CLEAR
CO2: 22 MMOL/L (ref 21–32)
COLOR: YELLOW
CREAT SERPL-MCNC: 0.6 MG/DL (ref 0.6–1.1)
GFR AFRICAN AMERICAN: >60
GFR NON-AFRICAN AMERICAN: >60
GLUCOSE BLD-MCNC: 93 MG/DL (ref 70–99)
GLUCOSE URINE: NEGATIVE MG/DL
HCT VFR BLD CALC: 42.7 % (ref 36–48)
HDLC SERPL-MCNC: 52 MG/DL (ref 40–60)
HEMOGLOBIN: 14.7 G/DL (ref 12–16)
KETONES, URINE: NEGATIVE MG/DL
LDL CHOLESTEROL CALCULATED: 88 MG/DL
LEUKOCYTE ESTERASE, URINE: NEGATIVE
MCH RBC QN AUTO: 33.3 PG (ref 26–34)
MCHC RBC AUTO-ENTMCNC: 34.5 G/DL (ref 31–36)
MCV RBC AUTO: 96.6 FL (ref 80–100)
MICROSCOPIC EXAMINATION: NORMAL
NITRITE, URINE: NEGATIVE
PDW BLD-RTO: 13.7 % (ref 12.4–15.4)
PH UA: 6.5 (ref 5–8)
PLATELET # BLD: 237 K/UL (ref 135–450)
PMV BLD AUTO: 10.4 FL (ref 5–10.5)
POTASSIUM SERPL-SCNC: 4.1 MMOL/L (ref 3.5–5.1)
PROTEIN UA: NEGATIVE MG/DL
RBC # BLD: 4.42 M/UL (ref 4–5.2)
SODIUM BLD-SCNC: 135 MMOL/L (ref 136–145)
SPECIFIC GRAVITY UA: 1.01 (ref 1–1.03)
TOTAL PROTEIN: 7.1 G/DL (ref 6.4–8.2)
TRIGL SERPL-MCNC: 102 MG/DL (ref 0–150)
TSH REFLEX: 3.44 UIU/ML (ref 0.27–4.2)
URINE TYPE: NORMAL
UROBILINOGEN, URINE: 0.2 E.U./DL
VLDLC SERPL CALC-MCNC: 20 MG/DL
WBC # BLD: 9.3 K/UL (ref 4–11)

## 2022-10-05 PROCEDURE — 80053 COMPREHEN METABOLIC PANEL: CPT

## 2022-10-05 PROCEDURE — 80061 LIPID PANEL: CPT

## 2022-10-05 PROCEDURE — 81003 URINALYSIS AUTO W/O SCOPE: CPT

## 2022-10-05 PROCEDURE — 99214 OFFICE O/P EST MOD 30 MIN: CPT | Performed by: INTERNAL MEDICINE

## 2022-10-05 PROCEDURE — 84443 ASSAY THYROID STIM HORMONE: CPT

## 2022-10-05 PROCEDURE — 36415 COLL VENOUS BLD VENIPUNCTURE: CPT

## 2022-10-05 PROCEDURE — 85027 COMPLETE CBC AUTOMATED: CPT

## 2022-10-05 RX ORDER — HYDROXYZINE HYDROCHLORIDE 25 MG/1
TABLET, FILM COATED ORAL
Qty: 90 TABLET | Refills: 2 | Status: SHIPPED | OUTPATIENT
Start: 2022-10-05

## 2022-10-05 RX ORDER — FLUOXETINE HYDROCHLORIDE 20 MG/1
CAPSULE ORAL
Qty: 90 CAPSULE | Refills: 0 | Status: SHIPPED | OUTPATIENT
Start: 2022-10-05

## 2022-10-05 RX ORDER — FLUTICASONE PROPIONATE AND SALMETEROL 250; 50 UG/1; UG/1
1 POWDER RESPIRATORY (INHALATION) EVERY 12 HOURS
Qty: 3 EACH | Refills: 0 | Status: SHIPPED | OUTPATIENT
Start: 2022-10-05

## 2022-10-05 RX ORDER — ATORVASTATIN CALCIUM 20 MG/1
20 TABLET, FILM COATED ORAL DAILY
Qty: 90 TABLET | Refills: 0 | Status: SHIPPED | OUTPATIENT
Start: 2022-10-05

## 2022-10-05 ASSESSMENT — ENCOUNTER SYMPTOMS
WHEEZING: 0
CHEST TIGHTNESS: 0
ABDOMINAL PAIN: 0
NAUSEA: 0
PHOTOPHOBIA: 0
VOMITING: 0

## 2022-10-05 NOTE — PROGRESS NOTES
Delmy Moran  1985  female  40 y.o. SUBJECTIVE:       Chief Complaint   Patient presents with    Insomnia     TROUBLE STAYING ASLEEP. 2-3 hours    Other     Denies wanting flu vaccine       HPI:  Follow-up visit for chronic problems. Patient is not sure whether she is taking hydroxyzine. She continues to feel difficulty to stay asleep. Since last visit she had CT scan abdomen and CT scan lower extremities. All the results have been reviewed with the patient. Patient continues to complain of pain affecting lower back. Is status post epidural injection. History of asthma. Using Advair every day. Still need to use albuterol at night whenever weather is humid.       Past Medical History:   Diagnosis Date    Asthma     Depression     HS (hereditary spherocytosis) ([facility])     HTN (hypertension)     Thyroid disease      Past Surgical History:   Procedure Laterality Date     SECTION      OTHER SURGICAL HISTORY Right 2018    EXCISION OF SEBACEOUS CYST OF BACK UPPER NECK    PAIN MANAGEMENT PROCEDURE Right 2021    RIGHT L3 TRANSFORAMINAL EPIDURAL STEROID INJECTION WITH FLUOROSCOPY performed by Chiki Sherman MD at [address] [place] [address] Right 9/15/2022    RIGHT L3 TRANSFORAMINAL EPIDURAL STEROID INJECTION WITH FLUOROSCOPY performed by Chiki Sherman MD at [place] [address] History     Socioeconomic History    Marital status: Single     Spouse name: None    Number of children: 1    Years of education: None    Highest education level: None   Occupational History    Occupation: / mobile phone expert     Comment: [employer]/on [employer] 12 hr days   Tobacco Use    Smoking status: Every Day     Packs/day: 0.40     Types: Cigarettes     Start date: 1998     Passive exposure: Past    Smokeless tobacco: Never   Vaping Use    Vaping Use: Unknown   Substance and Sexual Activity    Alcohol use: Yes    Drug use: No    Sexual activity: Yes     Partners: Male Birth control/protection: I.U.D. Social Determinants of Health     Financial Resource Strain: Low Risk     Difficulty of Paying Living Expenses: Not hard at all   Food Insecurity: No Food Insecurity    Worried About Running Out of Food in the Last Year: Never true    Ran Out of Food in the Last Year: Never true     Family History   Adopted: Yes   Family history unknown: Yes       Review of Systems   Constitutional:  Negative for unexpected weight change. Eyes:  Negative for photophobia and visual disturbance. Respiratory:  Negative for chest tightness and wheezing. Cardiovascular:  Negative for chest pain and palpitations. Gastrointestinal:  Negative for abdominal pain, nausea and vomiting. Neurological:  Negative for dizziness and light-headedness. OBJECTIVE:  Pulse Readings from Last 4 Encounters:   10/05/22 93   09/15/22 72   08/08/22 83   07/07/22 79     Wt Readings from Last 4 Encounters:   10/05/22 214 lb (97.1 kg)   09/15/22 215 lb (97.5 kg)   08/08/22 220 lb (99.8 kg)   07/07/22 218 lb 9.6 oz (99.2 kg)     BP Readings from Last 4 Encounters:   10/05/22 96/60   09/15/22 115/77   08/08/22 98/72   07/07/22 120/86     Physical Exam  Vitals and nursing note reviewed. Constitutional:       Appearance: Normal appearance. Eyes:      Conjunctiva/sclera: Conjunctivae normal.   Cardiovascular:      Rate and Rhythm: Normal rate and regular rhythm. Pulses: Normal pulses. Heart sounds: Normal heart sounds. Pulmonary:      Effort: Pulmonary effort is normal.      Breath sounds: Normal breath sounds. Abdominal:      General: Bowel sounds are normal.   Musculoskeletal:      Right lower leg: No edema. Left lower leg: No edema. Neurological:      General: No focal deficit present. Mental Status: She is alert and oriented to person, place, and time.    Psychiatric:         Mood and Affect: Mood normal.         Behavior: Behavior normal.       CBC:   Lab Results   Component Value Date/Time    WBC 9.3 10/05/2022 08:02 AM    HGB 14.7 10/05/2022 08:02 AM    HCT 42.7 10/05/2022 08:02 AM     10/05/2022 08:02 AM     CMP:  Lab Results   Component Value Date/Time     10/05/2022 08:02 AM    K 4.1 10/05/2022 08:02 AM     10/05/2022 08:02 AM    CO2 22 10/05/2022 08:02 AM    ANIONGAP 10 10/05/2022 08:02 AM    GLUCOSE 93 10/05/2022 08:02 AM    BUN 7 10/05/2022 08:02 AM    CREATININE 0.6 10/05/2022 08:02 AM    GFRAA >60 10/05/2022 08:02 AM    CALCIUM 9.0 10/05/2022 08:02 AM    PROT 7.1 10/05/2022 08:02 AM    LABALBU 4.3 10/05/2022 08:02 AM    AGRATIO 1.5 10/05/2022 08:02 AM    BILITOT 0.4 10/05/2022 08:02 AM    ALKPHOS 84 10/05/2022 08:02 AM    ALT 16 10/05/2022 08:02 AM    AST 20 10/05/2022 08:02 AM    GLOB 3.0 01/15/2020 11:58 AM     URINALYSIS:  Lab Results   Component Value Date/Time    GLUCOSEU Negative 10/05/2022 08:06 AM    KETUA Negative 10/05/2022 08:06 AM    SPECGRAV 1.015 10/05/2022 08:06 AM    BLOODU Negative 10/05/2022 08:06 AM    PHUR 6.5 10/05/2022 08:06 AM    PROTEINU Negative 10/05/2022 08:06 AM    NITRU Negative 10/05/2022 08:06 AM    LEUKOCYTESUR Negative 10/05/2022 08:06 AM    LABMICR Not Indicated 10/05/2022 08:06 AM    URINETYPE Voided 10/05/2022 08:06 AM     HBA1C: No results found for: LABA1C, EAG  MICRO/ALB:   Lab Results   Component Value Date/Time    LABMICR Not Indicated 10/05/2022 08:06 AM     LIPID:  Lab Results   Component Value Date/Time    CHOL 160 10/05/2022 08:02 AM    TRIG 102 10/05/2022 08:02 AM    HDL 52 10/05/2022 08:02 AM    LDLCALC 88 10/05/2022 08:02 AM    LABVLDL 20 10/05/2022 08:02 AM     TSH:   Lab Results   Component Value Date/Time    TSHREFLEX 3.44 10/05/2022 08:02 AM       ASSESSMENT/PLAN:  Assessment/Plan:  Gordon Mchugh was seen today for insomnia and other.     Diagnoses and all orders for this visit:    Primary insomnia  -     hydrOXYzine HCl (ATARAX) 25 MG tablet; TAKE 1 TABLET BY MOUTH EVERY 8 HOURS AS NEEDED FOR ANXIETY    Depression, unspecified depression type  We will continue.-     FLUoxetine (PROZAC) 20 MG capsule; TAKE ONE (1) CAPSULE BY MOUTH ONCE DAILY  Discontinue trazodone. Doubt Patient is taking this medicine. I advised her to bring all the medicine she is actively taking with her at next visit. Hyperlipidemia, unspecified hyperlipidemia type  Patient lipid profile reviewed today. In addition to diet lifestyle changes we will continue  -     atorvastatin (LIPITOR) 20 MG tablet; Take 1 tablet by mouth daily    Moderate persistent asthma with exacerbation  -     fluticasone-salmeterol (ADVAIR) 250-50 MCG/ACT AEPB diskus inhaler; Inhale 1 puff into the lungs in the morning and 1 puff in the evening. Albuterol as needed. Smoking cessation. Not ready to quit smoking. No orders of the defined types were placed in this encounter. Current Outpatient Medications   Medication Sig Dispense Refill    hydrOXYzine HCl (ATARAX) 25 MG tablet TAKE 1 TABLET BY MOUTH EVERY 8 HOURS AS NEEDED FOR ANXIETY 90 tablet 2    FLUoxetine (PROZAC) 20 MG capsule TAKE ONE (1) CAPSULE BY MOUTH ONCE DAILY 90 capsule 0    atorvastatin (LIPITOR) 20 MG tablet Take 1 tablet by mouth daily 90 tablet 0    fluticasone-salmeterol (ADVAIR) 250-50 MCG/ACT AEPB diskus inhaler Inhale 1 puff into the lungs in the morning and 1 puff in the evening.  3 each 0    melatonin 3 MG TABS tablet TAKE 1 TABLET BY MOUTH DAILY 30 tablet 10    tiZANidine (ZANAFLEX) 4 MG tablet TAKE 1 TABLET BY MOUTH 3 TIMES DAILY 90 tablet 2    albuterol sulfate HFA (PROVENTIL;VENTOLIN;PROAIR) 108 (90 Base) MCG/ACT inhaler Inhale 2 puffs into the lungs every 6 hours as needed for Wheezing 3 each 0    Cholecalciferol (VITAMIN D3) 1.25 MG (13167 UT) CAPS Take 50,000 capsules by mouth once a week 12 capsule 0    naproxen (NAPROSYN) 500 MG tablet Take 1 tablet by mouth 2 times daily as needed for Pain 180 tablet 1    nystatin (MYCOSTATIN) 599541 UNIT/GM powder Apply topically 4 times daily Apply topically 4 times daily. 3 each 0    clindamycin (CLINDAGEL) 1 % gel       BENZOYL PEROXIDE 5 % external wash Apply 142 g topically daily      adalimumab (HUMIRA PEN-PS/UV/ADOL HS START) 40 MG/0.8ML injection Inject 80 mg into the skin once for 1 dose Inject 80 mg SC x 3 doses on days 1, 2, 15, then 40 mg SC qwk on day 29. 2 each 0    Levonorgestrel (MIRENA IU) by IntraUTERine route       No current facility-administered medications for this visit. Return in about 3 months (around 1/5/2023). An After Visit Summary was printed and given to the patient. Documentation was done using voice recognition dragon software. Every effort was made to ensure accuracy; however, inadvertent  Unintentional computerized transcription errors may be present.

## 2022-11-06 DIAGNOSIS — J45.40 MODERATE PERSISTENT ASTHMA WITHOUT COMPLICATION: ICD-10-CM

## 2022-11-07 DIAGNOSIS — M54.42 CHRONIC MIDLINE LOW BACK PAIN WITH BILATERAL SCIATICA: ICD-10-CM

## 2022-11-07 DIAGNOSIS — G89.29 CHRONIC MIDLINE LOW BACK PAIN WITH BILATERAL SCIATICA: ICD-10-CM

## 2022-11-07 DIAGNOSIS — M54.41 CHRONIC MIDLINE LOW BACK PAIN WITH BILATERAL SCIATICA: ICD-10-CM

## 2022-11-07 RX ORDER — ALBUTEROL SULFATE 90 UG/1
AEROSOL, METERED RESPIRATORY (INHALATION)
Qty: 8.5 G | Refills: 5 | Status: SHIPPED | OUTPATIENT
Start: 2022-11-07

## 2022-11-08 RX ORDER — TIZANIDINE 4 MG/1
TABLET ORAL
Qty: 90 TABLET | Refills: 5 | Status: SHIPPED | OUTPATIENT
Start: 2022-11-08

## 2022-12-06 DIAGNOSIS — E55.9 VITAMIN D DEFICIENCY: ICD-10-CM

## 2022-12-07 RX ORDER — METHOCARBAMOL 750 MG/1
TABLET ORAL
Qty: 4 CAPSULE | Refills: 10 | Status: SHIPPED | OUTPATIENT
Start: 2022-12-07

## 2022-12-07 NOTE — TELEPHONE ENCOUNTER
Future Appointments    Encounter Information    Provider Department Appt Notes   1/19/2023 Chester Tena MD [facility] Internal Medicine 3 months     Past Visits    Date Provider Specialty Visit Type Primary Dx   10/05/2022 Chester Tena MD Internal Medicine Office Visit Primary insomnia

## 2023-01-04 DIAGNOSIS — M54.9 CHRONIC BACK PAIN GREATER THAN 3 MONTHS DURATION: ICD-10-CM

## 2023-01-04 DIAGNOSIS — F51.01 PRIMARY INSOMNIA: ICD-10-CM

## 2023-01-04 DIAGNOSIS — G89.29 CHRONIC BACK PAIN GREATER THAN 3 MONTHS DURATION: ICD-10-CM

## 2023-01-04 DIAGNOSIS — J45.41 MODERATE PERSISTENT ASTHMA WITH EXACERBATION: ICD-10-CM

## 2023-01-04 DIAGNOSIS — M25.50 ARTHRALGIA OF MULTIPLE JOINTS: ICD-10-CM

## 2023-01-05 RX ORDER — NAPROXEN 500 MG/1
TABLET ORAL
Qty: 60 TABLET | Refills: 5 | Status: SHIPPED | OUTPATIENT
Start: 2023-01-05

## 2023-01-05 RX ORDER — FLUTICASONE PROPIONATE AND SALMETEROL 50; 250 UG/1; UG/1
POWDER RESPIRATORY (INHALATION)
Qty: 1 EACH | Refills: 2 | Status: SHIPPED | OUTPATIENT
Start: 2023-01-05

## 2023-01-05 RX ORDER — HYDROXYZINE HYDROCHLORIDE 25 MG/1
TABLET, FILM COATED ORAL
Qty: 90 TABLET | Refills: 5 | Status: SHIPPED | OUTPATIENT
Start: 2023-01-05 | End: 2023-01-19

## 2023-01-05 NOTE — TELEPHONE ENCOUNTER
Future Appointments    Encounter Information    Provider Department Appt Notes   1/19/2023 Darci Baumgarten, MD [facility] Internal Medicine 3 months     Past Visits    Date Provider Specialty Visit Type Primary Dx   10/05/2022 Darci Baumgarten, MD Internal Medicine Office Visit Primary insomnia

## 2023-01-19 ENCOUNTER — OFFICE VISIT (OUTPATIENT)
Dept: INTERNAL MEDICINE CLINIC | Age: 38
End: 2023-01-19
Payer: COMMERCIAL

## 2023-01-19 VITALS
DIASTOLIC BLOOD PRESSURE: 82 MMHG | SYSTOLIC BLOOD PRESSURE: 132 MMHG | WEIGHT: 223.8 LBS | OXYGEN SATURATION: 96 % | HEART RATE: 88 BPM | BODY MASS INDEX: 45.2 KG/M2

## 2023-01-19 DIAGNOSIS — J45.41 MODERATE PERSISTENT ASTHMA WITH EXACERBATION: ICD-10-CM

## 2023-01-19 DIAGNOSIS — G25.81 RESTLESS LEG: ICD-10-CM

## 2023-01-19 DIAGNOSIS — F51.01 PRIMARY INSOMNIA: Primary | ICD-10-CM

## 2023-01-19 DIAGNOSIS — M35.00 SICCA COMPLEX (HCC): ICD-10-CM

## 2023-01-19 DIAGNOSIS — E27.8 LEFT ADRENAL MASS (HCC): ICD-10-CM

## 2023-01-19 PROCEDURE — 99214 OFFICE O/P EST MOD 30 MIN: CPT | Performed by: INTERNAL MEDICINE

## 2023-01-19 RX ORDER — GABAPENTIN 300 MG/1
300 CAPSULE ORAL NIGHTLY
Qty: 90 CAPSULE | Refills: 1 | Status: SHIPPED | OUTPATIENT
Start: 2023-01-19 | End: 2023-07-18

## 2023-01-19 ASSESSMENT — ENCOUNTER SYMPTOMS
ABDOMINAL PAIN: 0
SHORTNESS OF BREATH: 0
WHEEZING: 0
VOMITING: 0
NAUSEA: 0

## 2023-01-19 NOTE — PROGRESS NOTES
Susana Raymundo  1985  female  40 y.o. SUBJECTIVE:       Chief Complaint   Patient presents with    3 Month Follow-Up    Insomnia     Still not sleeping    Other     Restless legs to silvino legs bad       HPI:  Follow-up visit for chronic problems. Patient continues to suffer from insomnia. Despite taking hydroxyzine and melatonin, she did not notice any significant improvement of symptoms. On further questioning patient does complain of snoring as well as bilateral restless leg symptoms at night. History of asthma which has been excellent control. Rarely need rescue inhaler. She is using Advair regularly. Past Medical History:   Diagnosis Date    Asthma     Depression     HS (hereditary spherocytosis) (HCC)     HTN (hypertension)     Thyroid disease      Past Surgical History:   Procedure Laterality Date     SECTION      OTHER SURGICAL HISTORY Right 2018    EXCISION OF SEBACEOUS CYST OF BACK UPPER NECK    PAIN MANAGEMENT PROCEDURE Right 2021    RIGHT L3 TRANSFORAMINAL EPIDURAL STEROID INJECTION WITH FLUOROSCOPY performed by Gerry Greenwood MD at [address] [place] [address] Right 9/15/2022    RIGHT L3 TRANSFORAMINAL EPIDURAL STEROID INJECTION WITH FLUOROSCOPY performed by Gerry Greenwood MD at [place] [address] History     Socioeconomic History    Marital status: Single     Spouse name: None    Number of children: 1    Years of education: None    Highest education level: None   Occupational History    Occupation: / mobile phone expert     Comment: [employer]/on feet 12 hr days   Tobacco Use    Smoking status: Every Day     Packs/day: 0.40     Types: Cigarettes     Start date: 1998     Passive exposure: Past    Smokeless tobacco: Never   Vaping Use    Vaping Use: Unknown   Substance and Sexual Activity    Alcohol use: Yes    Drug use: No    Sexual activity: Yes     Partners: Male     Birth control/protection: I.U.D.      Social Determinants of Health     Financial Resource Strain: Low Risk     Difficulty of Paying Living Expenses: Not hard at all   Food Insecurity: No Food Insecurity    Worried About Running Out of Food in the Last Year: Never true    Ran Out of Food in the Last Year: Never true     Family History   Adopted: Yes   Family history unknown: Yes       Review of Systems   Constitutional:  Negative for diaphoresis and unexpected weight change.   Respiratory:  Negative for shortness of breath and wheezing.    Cardiovascular:  Negative for chest pain and palpitations.   Gastrointestinal:  Negative for abdominal pain, nausea and vomiting.   Neurological:  Negative for dizziness, light-headedness and headaches.     OBJECTIVE:  Pulse Readings from Last 4 Encounters:   01/19/23 88   10/05/22 93   09/15/22 72   08/08/22 83     Wt Readings from Last 4 Encounters:   01/19/23 223 lb 12.8 oz (101.5 kg)   10/05/22 214 lb (97.1 kg)   09/15/22 215 lb (97.5 kg)   08/08/22 220 lb (99.8 kg)     BP Readings from Last 4 Encounters:   01/19/23 132/82   10/05/22 96/60   09/15/22 115/77   08/08/22 98/72     Physical Exam  Vitals and nursing note reviewed.   Constitutional:       Appearance: She is obese. She is not ill-appearing.   Eyes:      Conjunctiva/sclera: Conjunctivae normal.   Cardiovascular:      Rate and Rhythm: Normal rate and regular rhythm.      Pulses: Normal pulses.      Heart sounds: Normal heart sounds.   Pulmonary:      Effort: Pulmonary effort is normal.      Breath sounds: Normal breath sounds. No wheezing or rhonchi.   Abdominal:      General: Bowel sounds are normal.   Musculoskeletal:      Right lower leg: No edema.      Left lower leg: No edema.   Neurological:      General: No focal deficit present.      Mental Status: She is alert and oriented to person, place, and time.       CBC:   Lab Results   Component Value Date/Time    WBC 9.3 10/05/2022 08:02 AM    HGB 14.7 10/05/2022 08:02 AM    HCT 42.7 10/05/2022 08:02 AM     10/05/2022 08:02 AM  CMP:  Lab Results   Component Value Date/Time     10/05/2022 08:02 AM    K 4.1 10/05/2022 08:02 AM     10/05/2022 08:02 AM    CO2 22 10/05/2022 08:02 AM    ANIONGAP 10 10/05/2022 08:02 AM    GLUCOSE 93 10/05/2022 08:02 AM    BUN 7 10/05/2022 08:02 AM    CREATININE 0.6 10/05/2022 08:02 AM    GFRAA >60 10/05/2022 08:02 AM    CALCIUM 9.0 10/05/2022 08:02 AM    PROT 7.1 10/05/2022 08:02 AM    LABALBU 4.3 10/05/2022 08:02 AM    AGRATIO 1.5 10/05/2022 08:02 AM    BILITOT 0.4 10/05/2022 08:02 AM    ALKPHOS 84 10/05/2022 08:02 AM    ALT 16 10/05/2022 08:02 AM    AST 20 10/05/2022 08:02 AM    GLOB 3.0 01/15/2020 11:58 AM     URINALYSIS:  Lab Results   Component Value Date/Time    GLUCOSEU Negative 10/05/2022 08:06 AM    KETUA Negative 10/05/2022 08:06 AM    SPECGRAV 1.015 10/05/2022 08:06 AM    BLOODU Negative 10/05/2022 08:06 AM    PHUR 6.5 10/05/2022 08:06 AM    PROTEINU Negative 10/05/2022 08:06 AM    NITRU Negative 10/05/2022 08:06 AM    LEUKOCYTESUR Negative 10/05/2022 08:06 AM    LABMICR Not Indicated 10/05/2022 08:06 AM    URINETYPE Voided 10/05/2022 08:06 AM       MICRO/ALB:   Lab Results   Component Value Date/Time    LABMICR Not Indicated 10/05/2022 08:06 AM     LIPID:  Lab Results   Component Value Date/Time    CHOL 160 10/05/2022 08:02 AM    TRIG 102 10/05/2022 08:02 AM    HDL 52 10/05/2022 08:02 AM    LDLCALC 88 10/05/2022 08:02 AM    LABVLDL 20 10/05/2022 08:02 AM     TSH:   Lab Results   Component Value Date/Time    TSHREFLEX 3.44 10/05/2022 08:02 AM         ASSESSMENT/PLAN:  Assessment/Plan:  Dagoberto Overall was seen today for 3 month follow-up, insomnia and other. Diagnoses and all orders for this visit:    Primary insomnia   restless legs  Trial of gabapentin. Need to rule out obstructive sleep apnea. -     Ambulatory referral to Sleep Medicine  Discussed use, benefit, and side effects of prescribed medications. Pt voiced understanding.  Advise to call me if there is any concerning symptoms. Sicca complex ([address]  Patient denies any dry eyes or dry mouth  In the past evaluated by rheumatologist.  No longer following up with rheumatologist.  No other signs symptoms of connective tissue disease. Left adrenal mass (HCC)  No further follow-up testing needed. As of last CT it has been stable  Moderate persistent asthma   No exacerbation at this time. Continue Advair. As needed albuterol. Restless leg  -     Ambulatory referral to Sleep Medicine  -     gabapentin (NEURONTIN) 300 MG capsule; Take 1 capsule by mouth nightly for 180 days. Intended supply: 90 days            Orders Placed This Encounter   Procedures    Ambulatory referral to Sleep Medicine     Referral Priority:   Routine     Referral Type:   Consult for Advice and Opinion     Referral Reason:   Specialty Services Required     Number of Visits Requested:   1     Current Outpatient Medications   Medication Sig Dispense Refill    gabapentin (NEURONTIN) 300 MG capsule Take 1 capsule by mouth nightly for 180 days.  Intended supply: 90 days 90 capsule 1    ADVAIR DISKUS 250-50 MCG/ACT AEPB diskus inhaler INHALE ONE (1) PUFF BY MOUTH INTO THE LUNGS IN THE MORNING AND INHALE ONE (1) PUFF IN THE EVENING 1 each 2    naproxen (NAPROSYN) 500 MG tablet TAKE 1 TABLET BY MOUTH TWICE DAILY AS NEEDED FOR PAIN 60 tablet 5    D3-50 1.25 MG (08095 UT) CAPS TAKE 1 CAPSULE BY MOUTH ONCE A WEEK 4 capsule 10    tiZANidine (ZANAFLEX) 4 MG tablet TAKE 1 TABLET BY MOUTH THREE TIMES A DAY 90 tablet 5    albuterol sulfate HFA (PROVENTIL;VENTOLIN;PROAIR) 108 (90 Base) MCG/ACT inhaler INHALE TWO (2) PUFFS BY MOUTH EVERY 6 HOURS AS NEEDED FOR WHEEZING 8.5 g 5    FLUoxetine (PROZAC) 20 MG capsule TAKE ONE (1) CAPSULE BY MOUTH ONCE DAILY 90 capsule 0    atorvastatin (LIPITOR) 20 MG tablet Take 1 tablet by mouth daily 90 tablet 0    melatonin 3 MG TABS tablet TAKE 1 TABLET BY MOUTH DAILY 30 tablet 10    nystatin (MYCOSTATIN) 865305 UNIT/GM powder Apply topically 4 times daily Apply topically 4 times daily. 3 each 0    clindamycin (CLINDAGEL) 1 % gel       BENZOYL PEROXIDE 5 % external wash Apply 142 g topically daily      Levonorgestrel (MIRENA IU) by IntraUTERine route      adalimumab (HUMIRA PEN-PS/UV/ADOL HS START) 40 MG/0.8ML injection Inject 80 mg into the skin once for 1 dose Inject 80 mg SC x 3 doses on days 1, 2, 15, then 40 mg SC qwk on day 29. 2 each 0     No current facility-administered medications for this visit. Return in about 3 months (around 4/19/2023). An After Visit Summary was printed and given to the patient. Documentation was done using voice recognition dragon software. Every effort was made to ensure accuracy; however, inadvertent  Unintentional computerized transcription errors may be present.

## 2023-02-02 DIAGNOSIS — F32.A DEPRESSION, UNSPECIFIED DEPRESSION TYPE: ICD-10-CM

## 2023-02-02 DIAGNOSIS — E78.5 HYPERLIPIDEMIA, UNSPECIFIED HYPERLIPIDEMIA TYPE: ICD-10-CM

## 2023-02-03 RX ORDER — ATORVASTATIN CALCIUM 20 MG/1
20 TABLET, FILM COATED ORAL DAILY
Qty: 30 TABLET | Refills: 5 | Status: SHIPPED | OUTPATIENT
Start: 2023-02-03

## 2023-02-03 RX ORDER — FLUOXETINE HYDROCHLORIDE 20 MG/1
CAPSULE ORAL
Qty: 30 CAPSULE | Refills: 5 | Status: SHIPPED | OUTPATIENT
Start: 2023-02-03

## 2023-02-03 NOTE — TELEPHONE ENCOUNTER
Future Appointments    Encounter Information    Provider Department Appt Notes   2/7/2023 Kerline Hernandez MD [facility] Internal Medicine (Insurance: Matteson Case, Member ID: [id number], Group ID: -, Referring Prov: -, Has Referral: No, Language: English,  Req: NoCresencio Opt In: Yes, Email: [email or web address], Phone: [phone]) REASON: loss and then maintain stanility in weight   4/19/2023 Pj Siu MD [facility] Internal Medicine Return in about 3 months (around 4/19/2023).      Past Visits    Date Provider Specialty Visit Type Primary Dx   01/19/2023 Pj Siu MD Internal Medicine Office Visit Primary insomnia   10/05/2022 Pj Siu MD Internal Medicine Office Visit Primary insomnia

## 2023-03-14 NOTE — PROGRESS NOTES
PATIENT REACHED   YES_X___NO____        DATE 3/16/2023________ TIME_1:00________ARRIVAL_12:00_______PLACE_[id number] [facility] [place] [address]___________      NOTHING TO EAT OR DRINK  AFTER MIDNIGHT THE EVENING PRIOR OR AS INSTRUCTED BY YOUR DR.  Shree De La Torre NEED A RESPONSIBLE ADULT AGE 25 OR OLDER TO DRIVE YOU HOME  PLEASE BRING INSURANCE CARD. PICTURE ID AND COMPLETE LIST OF MEDS  WEAR LOOSE COMFORTABLE CLOTHING  FOLLOW ANY INSTRUCTIONS YOUR DRS OFFICE HAS GIVEN YOU,INCLUDING WHAT MEDICATIONS TO TAKE THE AM OF PROCEDURE AND WHEN AND IF YOU NEED TO STOP ANY BLOOD THINNERS. IF YOU HAVE QUESTIONS REGARDING THIS CALL THE OFFICE  THE GOAL BLOOD SUGAR THE AM OF PROCEDURE  OR LESS ABOVE THAT THE PROCEDURE MAY BE CANCELLED  ANY QUESTIONS CALL YOUR DOCTOR. ALSO,PLEASE READ THE INSTRUCTION PACKET FROM YOUR DR IF YOU RECEIVED ONE. SPINE INTERVENTION NUMBER [phone]      OTHER___________________________________      VISITOR POLICY(subject to change)    Current policy is 2 visitors per patient. No children. Masks are required.

## 2023-03-16 ENCOUNTER — HOSPITAL ENCOUNTER (OUTPATIENT)
Age: 38
Setting detail: OUTPATIENT SURGERY
Discharge: HOME | End: 2023-03-16
Attending: PHYSICAL MEDICINE & REHABILITATION | Admitting: PHYSICAL MEDICINE & REHABILITATION
Payer: COMMERCIAL

## 2023-03-16 ENCOUNTER — APPOINTMENT (OUTPATIENT)
Dept: GENERAL RADIOLOGY | Age: 38
End: 2023-03-16
Attending: PHYSICAL MEDICINE & REHABILITATION
Payer: COMMERCIAL

## 2023-03-16 VITALS
SYSTOLIC BLOOD PRESSURE: 105 MMHG | HEIGHT: 59 IN | BODY MASS INDEX: 44.35 KG/M2 | TEMPERATURE: 97.2 F | WEIGHT: 220 LBS | RESPIRATION RATE: 16 BRPM | DIASTOLIC BLOOD PRESSURE: 84 MMHG | HEART RATE: 79 BPM | OXYGEN SATURATION: 99 %

## 2023-03-16 PROCEDURE — 77003 FLUOROGUIDE FOR SPINE INJECT: CPT

## 2023-03-16 PROCEDURE — 2709999900 HC NON-CHARGEABLE SUPPLY: Performed by: PHYSICAL MEDICINE & REHABILITATION

## 2023-03-16 PROCEDURE — 3610000056 HC PAIN LEVEL 4 BASE (NON-OR): Performed by: PHYSICAL MEDICINE & REHABILITATION

## 2023-03-16 PROCEDURE — 2500000003 HC RX 250 WO HCPCS: Performed by: PHYSICAL MEDICINE & REHABILITATION

## 2023-03-16 PROCEDURE — 99152 MOD SED SAME PHYS/QHP 5/>YRS: CPT | Performed by: PHYSICAL MEDICINE & REHABILITATION

## 2023-03-16 PROCEDURE — 6360000002 HC RX W HCPCS: Performed by: PHYSICAL MEDICINE & REHABILITATION

## 2023-03-16 RX ORDER — FENTANYL CITRATE 50 UG/ML
INJECTION, SOLUTION INTRAMUSCULAR; INTRAVENOUS
Status: COMPLETED | OUTPATIENT
Start: 2023-03-16 | End: 2023-03-16

## 2023-03-16 RX ORDER — MIDAZOLAM HYDROCHLORIDE 1 MG/ML
INJECTION INTRAMUSCULAR; INTRAVENOUS
Status: COMPLETED | OUTPATIENT
Start: 2023-03-16 | End: 2023-03-16

## 2023-03-16 RX ORDER — DEXAMETHASONE SODIUM PHOSPHATE 10 MG/ML
INJECTION, SOLUTION INTRAMUSCULAR; INTRAVENOUS
Status: COMPLETED | OUTPATIENT
Start: 2023-03-16 | End: 2023-03-16

## 2023-03-16 RX ORDER — LIDOCAINE HYDROCHLORIDE 10 MG/ML
INJECTION, SOLUTION EPIDURAL; INFILTRATION; INTRACAUDAL; PERINEURAL
Status: COMPLETED | OUTPATIENT
Start: 2023-03-16 | End: 2023-03-16

## 2023-03-16 ASSESSMENT — PAIN DESCRIPTION - PAIN TYPE
TYPE: CHRONIC PAIN
TYPE: CHRONIC PAIN

## 2023-03-16 ASSESSMENT — PAIN - FUNCTIONAL ASSESSMENT
PAIN_FUNCTIONAL_ASSESSMENT: PREVENTS OR INTERFERES SOME ACTIVE ACTIVITIES AND ADLS
PAIN_FUNCTIONAL_ASSESSMENT: 0-10

## 2023-03-16 ASSESSMENT — PAIN DESCRIPTION - FREQUENCY
FREQUENCY: CONTINUOUS
FREQUENCY: CONTINUOUS

## 2023-03-16 ASSESSMENT — PAIN DESCRIPTION - ORIENTATION
ORIENTATION: RIGHT;LEFT;LOWER
ORIENTATION: RIGHT;LEFT;LOWER

## 2023-03-16 ASSESSMENT — PAIN DESCRIPTION - LOCATION
LOCATION: BACK
LOCATION: BACK

## 2023-03-16 ASSESSMENT — PAIN DESCRIPTION - DESCRIPTORS: DESCRIPTORS: BURNING;TINGLING

## 2023-03-16 ASSESSMENT — PAIN SCALES - GENERAL
PAINLEVEL_OUTOF10: 6
PAINLEVEL_OUTOF10: 5

## 2023-03-16 ASSESSMENT — PAIN DESCRIPTION - DIRECTION: RADIATING_TOWARDS: DOWN BILATERAL LEGS

## 2023-03-16 NOTE — OP NOTE
PATIENT:  Josie Doherty  AGE:  45 yrs  MEDICAL RECORD #:  [id number]  YOB: 1985     DATE:  3/16/2023  PHYSICIAN: Mavis Barksdale M.D. PROCEDURE: Bilateral L3 transforaminal epidural steroid injection under fluoroscopy. PRE-OP DIAGNOSIS:  Low Back Pain/Radiculopathy     POST-OP DIAGNOSIS:  same     HISTORY OF PRESENT ILLNESS:  See office notes. Patient has failed previous less-invasive treatments. ALLERGIES:  Chantix [varenicline] and Seroquel [quetiapine fumarate]     MEDICATIONS:    No current facility-administered medications for this encounter. PHYSICAL EXAMINATION:              General:  Awake, alert              Heart:  No audible murmurs, extremities well perfused              Lungs:  No increased WOB or audible wheezing              Extremities:  Normal tone. Warm. No swelling. Anesthesia: 1 mg Versed and 50 mcg fentanyl    Estimated blood loss: None  Complications: None  Specimen: None    DESCRIPTION OF PROCEDURE:     Components of the procedure were again reviewed with the patient prior to the procedure. She is aware of risks including infection, bleeding, allergic reaction, and nerve injury. She had ample opportunity for additional questions. She elected to proceed with treatment. The patient was placed in the prone position. Cardiovascular monitoring was initiated, and vital signs were stable prior to, during, and after the procedure. Utilizing fluoroscopy, the L3 vertebrae was identified. The area was sterilely prepped and draped. Skin anesthesia was achieved at each entry site using 1-2 cc of Lidocaine 1%. A 22 g 5.0 inch spinal needle was slowly inserted into the bilateral L3 neuroforamen using AP, lateral and oblique fluoroscopic imaging. Negative aspiration was confirmed. 1 cc Isovue-M 300 was injected at each site showing contrast spread into the epidural space and along the nerve root.   A combination of 1 cc Lidocaine 1% and 10 mg dexamethasone were slowly injected at each site. The needles were removed after the stylets were repositioned. A sterile bandage was applied. The patient was brought to recovery in stable condition. The patient tolerated the procedure well. DISPOSITION:  The patient was transported to recovery. The patient was monitored for 15 to 20 minutes post-procedure. Precautions were discussed and written instructions provided. Comment: Good epidural and NR flow at both sites.

## 2023-03-16 NOTE — DISCHARGE INSTRUCTIONS
DR. Rafiq Uriostegui DISCHARGE INSTRUCTIONS           1. Do not drive today for ( 8 hours with no sedation)  (24 hours if had sedation)     2. If you received sedation during your procedure, be advised for the next 24 hour       Do not drink alcohol    Do not operate machinery    Do not make any important decisions or sign any legal documents    You may experience light headedness,dizziness or sleepiness     3. You may apply ice for 15 minutes 4-5 times a day as needed. 4. No heating pad for 48 hours     5. Follow up with the Provider who referred you. 6.Keep site dry for 24 hours after procedure, then remove bandaid. 7.It may take 24-48 hours to feel improvement. 8. Side effects of Steroids may include:      Elevated glucose levels ( in diabetics)    Fluid retention    Tenderness at site    Nervous energy    Sleeplessness    Muscle spasms    Facial flushing    Hot flashes     9. Call if:    Your symptoms worsen severely    You experience a severe headache that worsens when upright    You develop a fever greater than 101 degrees     ([phone])                             10. You may restart any blood thinning medications tomorrow.

## 2023-03-16 NOTE — H&P
HISTORY AND PHYSICAL/PRE-SEDATION ASSESSMENT    Patient:  Yue Rodriguez   :  1985  Medical Record No.:  [id number]   Date:  3/16/2023  Physician:  Arlin Soriano MD  Facility: [address] [place] [address] Drive:                 The patient is a 45 y.o. female whom presents with leg pain. Review of the imaging and physical exam of the patient confirmed the pre-procedure diagnosis. After a thorough discussion of risks, benefits and alternatives informed consent was obtained. Diagnosis:  Lumbar radiculopathy [M54.16]    Past Medical History:   Past Medical History:   Diagnosis Date    Asthma     Depression     HS (hereditary spherocytosis) ([facility] [address]     HTN (hypertension)     Thyroid disease         Past Surgical History:     Past Surgical History:   Procedure Laterality Date     SECTION      OTHER SURGICAL HISTORY Right 2018    EXCISION OF SEBACEOUS CYST OF BACK UPPER NECK    PAIN MANAGEMENT PROCEDURE Right 2021    RIGHT L3 TRANSFORAMINAL EPIDURAL STEROID INJECTION WITH FLUOROSCOPY performed by Arlin Soriano MD at [address] [place] [address] Right 9/15/2022    RIGHT L3 TRANSFORAMINAL EPIDURAL STEROID INJECTION WITH FLUOROSCOPY performed by Arlin Soriano MD at [facility]       Current Medications:   Prior to Admission medications    Medication Sig Start Date End Date Taking? Authorizing Provider   atorvastatin (LIPITOR) 20 MG tablet TAKE 1 TABLET BY MOUTH DAILY 2/3/23   Yue Ramos MD   FLUoxetine (PROZAC) 20 MG capsule TAKE 1 CAPSULE BY MOUTH ONCE DAILY 2/3/23   M Chelo Boucher MD   gabapentin (NEURONTIN) 300 MG capsule Take 1 capsule by mouth nightly for 180 days.  Intended supply: 90 days 23  Yue Ramos MD   ADVAIR DISKUS 250-50 MCG/ACT AEPB diskus inhaler INHALE ONE (1) PUFF BY MOUTH INTO THE LUNGS IN THE MORNING AND INHALE ONE (1) PUFF IN THE EVENING 23   M Sunny Ramos MD   naproxen (NAPROSYN) 500 MG tablet TAKE 1 TABLET BY MOUTH TWICE DAILY AS NEEDED FOR PAIN 1/5/23   Ismael Ramos MD   D3-50 1.25 MG (44543 UT) CAPS TAKE 1 CAPSULE BY MOUTH ONCE A WEEK 12/7/22   SANCHO Blue MD   tiZANidine (ZANAFLEX) 4 MG tablet TAKE 1 TABLET BY MOUTH THREE TIMES A DAY 11/8/22   SANCHO Ramos MD   albuterol sulfate HFA (PROVENTIL;VENTOLIN;PROAIR) 108 (90 Base) MCG/ACT inhaler INHALE TWO (2) PUFFS BY MOUTH EVERY 6 HOURS AS NEEDED FOR WHEEZING 11/7/22   Mickey Christine MD   melatonin 3 MG TABS tablet TAKE 1 TABLET BY MOUTH DAILY 8/16/22   Ismael Ramos MD   nystatin (MYCOSTATIN) 348201 UNIT/GM powder Apply topically 4 times daily Apply topically 4 times daily. 7/7/22   Mickey Christine MD   clindamycin (CLINDAGEL) 1 % gel  1/22/20   Historical Provider, MD   BENZOYL PEROXIDE 5 % external wash Apply 142 g topically daily 12/31/19   Historical Provider, MD   adalimumab (HUMIRA PEN-PS/UV/ADOL HS START) 40 MG/0.8ML injection Inject 80 mg into the skin once for 1 dose Inject 80 mg SC x 3 doses on days 1, 2, 15, then 40 mg SC qwk on day 29. 1/15/20 10/5/22  Corina Triplett MD   Levonorgestrel (MIRENA IU) by IntraUTERine route    Historical Provider, MD       Allergies:  Chantix [varenicline] and Seroquel [quetiapine fumarate]    Social History:    reports that she has been smoking cigarettes. She started smoking about 25 years ago. She has been smoking an average of .4 packs per day. She has been exposed to tobacco smoke. She has never used smokeless tobacco. She reports current alcohol use. She reports that she does not use drugs. Family History:   Family History   Adopted: Yes   Family history unknown: Yes        Vitals: Blood pressure 115/79, pulse 85, temperature 97.2 °F (36.2 °C), temperature source Temporal, resp. rate 18, height 4' 11\" (1.499 m), weight 220 lb (99.8 kg), SpO2 95 %, not currently breastfeeding.     PHYSICAL EXAM:  HENT: Airway patent and reviewed  Cardiovascular: Normal rate, regular rhythm, normal heart sounds. Pulmonary/Chest: No wheezes. No rhonchi. No rales. Abdominal: Soft. Bowel sounds are normal. No distension. Extremities: Moves all extremities equally  Lumbar Spine: Painful range of motion, no midline tenderness     ASA CLASS:         []   I. Normal, healthy adult           [x]   II.  Mild systemic disease            []   III. Severe systemic disease    Mallampati: Mallampati Class II - (soft palate, fauces & uvula are visible)      Sedation plan:   []  Local              [x]  Minimal                  []  General anesthesia    Treatment plan:  Patient's condition acceptable for planned procedure/sedation. Proceed with planned procedure   Post Procedure Plan   Return to same level of care   ______________________     The risks and benefits as well as alternatives to the procedure have been discussed with the patient and or family. The patient and/or next of kin understands and agrees to proceed.     Chao Murray MD

## 2023-04-03 DIAGNOSIS — J45.41 MODERATE PERSISTENT ASTHMA WITH EXACERBATION: ICD-10-CM

## 2023-04-04 RX ORDER — FLUTICASONE PROPIONATE AND SALMETEROL 50; 250 UG/1; UG/1
POWDER RESPIRATORY (INHALATION)
Qty: 1 EACH | Refills: 3 | Status: SHIPPED | OUTPATIENT
Start: 2023-04-04

## 2023-04-26 ENCOUNTER — OFFICE VISIT (OUTPATIENT)
Dept: PULMONOLOGY | Age: 38
End: 2023-04-26
Payer: COMMERCIAL

## 2023-04-26 VITALS
SYSTOLIC BLOOD PRESSURE: 136 MMHG | WEIGHT: 213 LBS | DIASTOLIC BLOOD PRESSURE: 80 MMHG | BODY MASS INDEX: 42.94 KG/M2 | OXYGEN SATURATION: 98 % | HEIGHT: 59 IN

## 2023-04-26 DIAGNOSIS — G47.10 HYPERSOMNIA: Primary | ICD-10-CM

## 2023-04-26 DIAGNOSIS — F55.8 ABUSE OF OTHER NON-PSYCHOACTIVE SUBSTANCES: ICD-10-CM

## 2023-04-26 DIAGNOSIS — G47.00 INSOMNIA, UNSPECIFIED TYPE: ICD-10-CM

## 2023-04-26 DIAGNOSIS — R53.83 FATIGUE, UNSPECIFIED TYPE: ICD-10-CM

## 2023-04-26 DIAGNOSIS — R06.81 WITNESSED EPISODE OF APNEA: ICD-10-CM

## 2023-04-26 PROCEDURE — 99204 OFFICE O/P NEW MOD 45 MIN: CPT | Performed by: STUDENT IN AN ORGANIZED HEALTH CARE EDUCATION/TRAINING PROGRAM

## 2023-04-26 RX ORDER — ESZOPICLONE 3 MG/1
3 TABLET, FILM COATED ORAL NIGHTLY
Qty: 30 TABLET | Refills: 0 | Status: SHIPPED | OUTPATIENT
Start: 2023-04-26 | End: 2023-05-26

## 2023-04-26 ASSESSMENT — SLEEP AND FATIGUE QUESTIONNAIRES
HOW LIKELY ARE YOU TO NOD OFF OR FALL ASLEEP WHILE SITTING AND TALKING TO SOMEONE: 0
HOW LIKELY ARE YOU TO NOD OFF OR FALL ASLEEP WHEN YOU ARE A PASSENGER IN A CAR FOR AN HOUR WITHOUT A BREAK: 3
HOW LIKELY ARE YOU TO NOD OFF OR FALL ASLEEP WHILE WATCHING TV: 3
HOW LIKELY ARE YOU TO NOD OFF OR FALL ASLEEP IN A CAR, WHILE STOPPED FOR A FEW MINUTES IN TRAFFIC: 0
NECK CIRCUMFERENCE (INCHES): 15
HOW LIKELY ARE YOU TO NOD OFF OR FALL ASLEEP WHILE SITTING QUIETLY AFTER LUNCH WITHOUT ALCOHOL: 0
ESS TOTAL SCORE: 11
HOW LIKELY ARE YOU TO NOD OFF OR FALL ASLEEP WHILE SITTING INACTIVE IN A PUBLIC PLACE: 0
HOW LIKELY ARE YOU TO NOD OFF OR FALL ASLEEP WHILE SITTING AND READING: 2
HOW LIKELY ARE YOU TO NOD OFF OR FALL ASLEEP WHILE LYING DOWN TO REST IN THE AFTERNOON WHEN CIRCUMSTANCES PERMIT: 3

## 2023-04-26 NOTE — PROGRESS NOTES
[facility]  Sleep Medicine  [address], [address] [facility],[address]  [facility], [address]    Chief Complaint   Patient presents with    Insomnia     Hasnt slept in a few days ,   Only gets about 2 to 4 hours of sleep if able to sleep , caches herself dozing daily        Maame Erickson is a 45 y.o. female who comes in for sleep evaluation. Her complaints include insomnia. Onset of symptoms was several years ago (she reports that her hydradenitis suppurativa). Pertinent PMHx includes: insomnia, hydradenitis, asthma/COPD, anxiety and depression. She goes to bed at 10pm or after. She falls asleep in  hours. She awakens at 5-6am.    She awakens several times per night. She does use medication for sleep: melatonin. She does take daytime naps 2-3 naps a day. She describes the symptoms as daytime sleepiness, snoring, witnessed apneas, disrupted sleep, difficulty falling asleep, difficulty staying asleep, restless sleep, waking up in the middle of the night gasping for air, falling asleep/dosing off during idle situations, and morning dry mouth. She denies recent significant weight gain. She has not fallen asleep while driving. She does not have symptoms that fulfill the criteria for restless legs syndrome. Previous evaluation and treatment has included: none.     Family hx of sleep disorders: grandmother with sleep problem  Caffeinated drinks/day: 2-7 of cups of coffee a day  Alcohol intake/day/week: none  Occupation: disability      DATA REVIEWED TODAY:  [place] & Insomnia Severity Index scores  Sleep Medicine 4/26/2023   Sitting and reading 2   Watching TV 3   Sitting, inactive in a public place (e.g. a theatre or a meeting) 0   As a passenger in a car for an hour without a break 3   Lying down to rest in the afternoon when circumstances permit 3   Sitting and talking to someone 0   Sitting quietly after a lunch without alcohol 0   In a car, while stopped for a few minutes in traffic 0   [place] Sleepiness

## 2023-04-26 NOTE — PATIENT INSTRUCTIONS
Patient information on the evaluation procedure for sleep apnea:    1. You are going to have a portable sleep study: This is a home sleep study that will be done to see if you have obstructive sleep apnea. You will have a flow monitor on your nose, belt on your chest and a oxygen/heart rate monitor on your finger. Please do not wear nail polish on day of the study as it will interfere with the oxygen reading probe that is placed on your finger during the study. Once you receive the device, you will also receive instructions on how to put it on and turn it on. After 2 nights you will return it. 2. The sleep office will call you with the results a week after the study. If the study showed that you have obstructive sleep apnea you will be told of the next step in your care. Commonly the recommended treatment is CPAP Therapy. If you agree to this therapy and you receive your CPAP before your next appointment, please bring it with you to your first follow-up appointment. Patients who have obstructive sleep apnea on the sleep study and have chosen to try CPAP:  A home equipment company will contact you to set you up with a CPAP machine and fit you for a mask. Please bring your CPAP, the mask and all supplies including the electrical cord with you to all your first follow up appointments with the sleep physician    Please keep trying to use your CPAP until you have seen in the sleep office in follow up as a lot of the problems patients have with CPAP can be addressed and corrected by your sleep provider. Sleep center contact information:  [id number] [facility]: [phone]  [facility]: [phone]             What are the risk factors for Obstructive Sleep Apnea (GRECIA)? Obesity  Snoring  Daytime sleepiness  Increasing age  Male gender  Taking sedating medications  Alcohol use  Hypertension  Stroke  Diabetes  Smoking     What is GRECIA?   People with GRECIA experience recurrent

## 2023-05-03 DIAGNOSIS — M54.42 CHRONIC MIDLINE LOW BACK PAIN WITH BILATERAL SCIATICA: ICD-10-CM

## 2023-05-03 DIAGNOSIS — G89.29 CHRONIC MIDLINE LOW BACK PAIN WITH BILATERAL SCIATICA: ICD-10-CM

## 2023-05-03 DIAGNOSIS — J45.40 MODERATE PERSISTENT ASTHMA WITHOUT COMPLICATION: ICD-10-CM

## 2023-05-03 DIAGNOSIS — M54.41 CHRONIC MIDLINE LOW BACK PAIN WITH BILATERAL SCIATICA: ICD-10-CM

## 2023-05-04 RX ORDER — TIZANIDINE 4 MG/1
TABLET ORAL
Qty: 90 TABLET | Refills: 0 | Status: SHIPPED | OUTPATIENT
Start: 2023-05-04 | End: 2023-06-06

## 2023-05-04 RX ORDER — ALBUTEROL SULFATE 90 UG/1
AEROSOL, METERED RESPIRATORY (INHALATION)
Qty: 8.5 G | Refills: 1 | Status: SHIPPED | OUTPATIENT
Start: 2023-05-04 | End: 2023-07-05

## 2023-06-04 DIAGNOSIS — G25.81 RESTLESS LEG: ICD-10-CM

## 2023-06-04 DIAGNOSIS — M54.42 CHRONIC MIDLINE LOW BACK PAIN WITH BILATERAL SCIATICA: ICD-10-CM

## 2023-06-04 DIAGNOSIS — M54.41 CHRONIC MIDLINE LOW BACK PAIN WITH BILATERAL SCIATICA: ICD-10-CM

## 2023-06-04 DIAGNOSIS — G89.29 CHRONIC MIDLINE LOW BACK PAIN WITH BILATERAL SCIATICA: ICD-10-CM

## 2023-06-06 ENCOUNTER — HOSPITAL ENCOUNTER (OUTPATIENT)
Dept: SLEEP CENTER | Age: 38
Discharge: HOME | End: 2023-06-06
Payer: COMMERCIAL

## 2023-06-06 DIAGNOSIS — M54.41 CHRONIC MIDLINE LOW BACK PAIN WITH BILATERAL SCIATICA: ICD-10-CM

## 2023-06-06 DIAGNOSIS — M54.42 CHRONIC MIDLINE LOW BACK PAIN WITH BILATERAL SCIATICA: ICD-10-CM

## 2023-06-06 DIAGNOSIS — R53.83 FATIGUE, UNSPECIFIED TYPE: ICD-10-CM

## 2023-06-06 DIAGNOSIS — G25.81 RESTLESS LEG: ICD-10-CM

## 2023-06-06 DIAGNOSIS — G47.10 HYPERSOMNIA: ICD-10-CM

## 2023-06-06 DIAGNOSIS — R06.81 WITNESSED EPISODE OF APNEA: ICD-10-CM

## 2023-06-06 DIAGNOSIS — G89.29 CHRONIC MIDLINE LOW BACK PAIN WITH BILATERAL SCIATICA: ICD-10-CM

## 2023-06-06 DIAGNOSIS — G47.00 INSOMNIA, UNSPECIFIED TYPE: ICD-10-CM

## 2023-06-06 PROCEDURE — 95806 SLEEP STUDY UNATT&RESP EFFT: CPT

## 2023-06-06 RX ORDER — GABAPENTIN 300 MG/1
300 CAPSULE ORAL NIGHTLY
Qty: 90 CAPSULE | Refills: 0 | Status: SHIPPED | OUTPATIENT
Start: 2023-06-06 | End: 2023-06-06 | Stop reason: SDUPTHER

## 2023-06-06 RX ORDER — GABAPENTIN 300 MG/1
CAPSULE ORAL
Qty: 90 CAPSULE | Refills: 0 | Status: SHIPPED | OUTPATIENT
Start: 2023-06-06 | End: 2023-06-06 | Stop reason: SDUPTHER

## 2023-06-06 RX ORDER — TIZANIDINE 4 MG/1
TABLET ORAL
Qty: 90 TABLET | Refills: 0 | Status: SHIPPED | OUTPATIENT
Start: 2023-06-06 | End: 2023-06-06 | Stop reason: SDUPTHER

## 2023-06-06 RX ORDER — GABAPENTIN 300 MG/1
300 CAPSULE ORAL NIGHTLY
Qty: 90 CAPSULE | Refills: 0 | Status: SHIPPED | OUTPATIENT
Start: 2023-06-06 | End: 2023-09-04

## 2023-06-06 RX ORDER — TIZANIDINE 4 MG/1
TABLET ORAL
Qty: 90 TABLET | Refills: 0 | Status: SHIPPED | OUTPATIENT
Start: 2023-06-06

## 2023-06-06 NOTE — TELEPHONE ENCOUNTER
Pt was due for follow up visit around 4/19/23.     Past Visits    Date Provider Specialty Visit Type Primary Dx   01/19/2023 Julieth Quinones MD Internal Medicine Office Visit Primary insomnia

## 2023-06-06 NOTE — PROGRESS NOTES
Previous refills printed out in error - no provider was listed in RX. RX updated and sent electronically to pharmacy.

## 2023-06-14 PROBLEM — R06.81 WITNESSED EPISODE OF APNEA: Status: ACTIVE | Noted: 2023-06-14

## 2023-07-03 DIAGNOSIS — F51.01 PRIMARY INSOMNIA: ICD-10-CM

## 2023-07-03 DIAGNOSIS — M54.9 CHRONIC BACK PAIN GREATER THAN 3 MONTHS DURATION: ICD-10-CM

## 2023-07-03 DIAGNOSIS — G89.29 CHRONIC BACK PAIN GREATER THAN 3 MONTHS DURATION: ICD-10-CM

## 2023-07-03 DIAGNOSIS — M25.50 ARTHRALGIA OF MULTIPLE JOINTS: ICD-10-CM

## 2023-07-03 DIAGNOSIS — J45.40 MODERATE PERSISTENT ASTHMA WITHOUT COMPLICATION: ICD-10-CM

## 2023-07-05 RX ORDER — HYDROXYZINE HYDROCHLORIDE 25 MG/1
TABLET, FILM COATED ORAL
Qty: 90 TABLET | Refills: 10 | OUTPATIENT
Start: 2023-07-05

## 2023-07-05 RX ORDER — NAPROXEN 500 MG/1
TABLET ORAL
Qty: 60 TABLET | Refills: 10 | OUTPATIENT
Start: 2023-07-05

## 2023-07-05 RX ORDER — LANOLIN ALCOHOL/MO/W.PET/CERES
3 CREAM (GRAM) TOPICAL DAILY
Qty: 30 TABLET | Refills: 0 | Status: SHIPPED | OUTPATIENT
Start: 2023-07-05

## 2023-07-05 RX ORDER — ALBUTEROL SULFATE 90 UG/1
AEROSOL, METERED RESPIRATORY (INHALATION)
Qty: 8.5 G | Refills: 0 | Status: SHIPPED | OUTPATIENT
Start: 2023-07-05 | End: 2023-08-03

## 2023-07-10 DIAGNOSIS — M25.50 ARTHRALGIA OF MULTIPLE JOINTS: ICD-10-CM

## 2023-07-10 DIAGNOSIS — G89.29 CHRONIC BACK PAIN GREATER THAN 3 MONTHS DURATION: ICD-10-CM

## 2023-07-10 DIAGNOSIS — M54.9 CHRONIC BACK PAIN GREATER THAN 3 MONTHS DURATION: ICD-10-CM

## 2023-07-10 DIAGNOSIS — F51.01 PRIMARY INSOMNIA: ICD-10-CM

## 2023-07-10 RX ORDER — NAPROXEN 500 MG/1
TABLET ORAL
Qty: 60 TABLET | Refills: 10 | OUTPATIENT
Start: 2023-07-10

## 2023-07-10 RX ORDER — HYDROXYZINE HYDROCHLORIDE 25 MG/1
TABLET, FILM COATED ORAL
Qty: 90 TABLET | Refills: 10 | OUTPATIENT
Start: 2023-07-10

## 2023-07-16 DIAGNOSIS — F51.01 PRIMARY INSOMNIA: ICD-10-CM

## 2023-07-16 DIAGNOSIS — M54.9 CHRONIC BACK PAIN GREATER THAN 3 MONTHS DURATION: ICD-10-CM

## 2023-07-16 DIAGNOSIS — M25.50 ARTHRALGIA OF MULTIPLE JOINTS: ICD-10-CM

## 2023-07-16 DIAGNOSIS — G89.29 CHRONIC BACK PAIN GREATER THAN 3 MONTHS DURATION: ICD-10-CM

## 2023-07-17 RX ORDER — HYDROXYZINE HYDROCHLORIDE 25 MG/1
TABLET, FILM COATED ORAL
Qty: 90 TABLET | Refills: 10 | OUTPATIENT
Start: 2023-07-17

## 2023-07-17 RX ORDER — NAPROXEN 500 MG/1
TABLET ORAL
Qty: 60 TABLET | Refills: 10 | OUTPATIENT
Start: 2023-07-17

## 2023-07-18 DIAGNOSIS — F51.01 PRIMARY INSOMNIA: ICD-10-CM

## 2023-07-18 DIAGNOSIS — M25.50 ARTHRALGIA OF MULTIPLE JOINTS: ICD-10-CM

## 2023-07-18 DIAGNOSIS — G89.29 CHRONIC BACK PAIN GREATER THAN 3 MONTHS DURATION: ICD-10-CM

## 2023-07-18 DIAGNOSIS — M54.9 CHRONIC BACK PAIN GREATER THAN 3 MONTHS DURATION: ICD-10-CM

## 2023-07-18 RX ORDER — NAPROXEN 500 MG/1
TABLET ORAL
Qty: 60 TABLET | Refills: 10 | OUTPATIENT
Start: 2023-07-18

## 2023-07-18 RX ORDER — HYDROXYZINE HYDROCHLORIDE 25 MG/1
TABLET, FILM COATED ORAL
Qty: 90 TABLET | Refills: 10 | OUTPATIENT
Start: 2023-07-18

## 2023-07-20 ENCOUNTER — OFFICE VISIT (OUTPATIENT)
Dept: INTERNAL MEDICINE CLINIC | Age: 38
End: 2023-07-20
Payer: COMMERCIAL

## 2023-07-20 VITALS
DIASTOLIC BLOOD PRESSURE: 82 MMHG | WEIGHT: 220.8 LBS | BODY MASS INDEX: 44.6 KG/M2 | OXYGEN SATURATION: 99 % | HEART RATE: 87 BPM | SYSTOLIC BLOOD PRESSURE: 120 MMHG

## 2023-07-20 DIAGNOSIS — E66.01 CLASS 3 SEVERE OBESITY WITH SERIOUS COMORBIDITY IN ADULT, UNSPECIFIED BMI, UNSPECIFIED OBESITY TYPE (HCC): ICD-10-CM

## 2023-07-20 DIAGNOSIS — Z13.1 SCREENING FOR DIABETES MELLITUS: ICD-10-CM

## 2023-07-20 DIAGNOSIS — F32.A DEPRESSION, UNSPECIFIED DEPRESSION TYPE: ICD-10-CM

## 2023-07-20 DIAGNOSIS — J45.40 MODERATE PERSISTENT ASTHMA WITHOUT COMPLICATION: Primary | ICD-10-CM

## 2023-07-20 DIAGNOSIS — E78.5 HYPERLIPIDEMIA, UNSPECIFIED HYPERLIPIDEMIA TYPE: ICD-10-CM

## 2023-07-20 DIAGNOSIS — E78.01 FAMILIAL HYPERCHOLESTEROLEMIA: ICD-10-CM

## 2023-07-20 PROCEDURE — 99214 OFFICE O/P EST MOD 30 MIN: CPT | Performed by: INTERNAL MEDICINE

## 2023-07-20 RX ORDER — FLUOXETINE HYDROCHLORIDE 40 MG/1
CAPSULE ORAL
Qty: 30 CAPSULE | Refills: 2 | Status: SHIPPED | OUTPATIENT
Start: 2023-07-20

## 2023-07-20 ASSESSMENT — PATIENT HEALTH QUESTIONNAIRE - PHQ9
2. FEELING DOWN, DEPRESSED OR HOPELESS: 3
8. MOVING OR SPEAKING SO SLOWLY THAT OTHER PEOPLE COULD HAVE NOTICED. OR THE OPPOSITE, BEING SO FIGETY OR RESTLESS THAT YOU HAVE BEEN MOVING AROUND A LOT MORE THAN USUAL: 3
10. IF YOU CHECKED OFF ANY PROBLEMS, HOW DIFFICULT HAVE THESE PROBLEMS MADE IT FOR YOU TO DO YOUR WORK, TAKE CARE OF THINGS AT HOME, OR GET ALONG WITH OTHER PEOPLE: 1
1. LITTLE INTEREST OR PLEASURE IN DOING THINGS: 3
SUM OF ALL RESPONSES TO PHQ QUESTIONS 1-9: 22
5. POOR APPETITE OR OVEREATING: 3
SUM OF ALL RESPONSES TO PHQ QUESTIONS 1-9: 22
6. FEELING BAD ABOUT YOURSELF - OR THAT YOU ARE A FAILURE OR HAVE LET YOURSELF OR YOUR FAMILY DOWN: 3
SUM OF ALL RESPONSES TO PHQ QUESTIONS 1-9: 22
7. TROUBLE CONCENTRATING ON THINGS, SUCH AS READING THE NEWSPAPER OR WATCHING TELEVISION: 1
9. THOUGHTS THAT YOU WOULD BE BETTER OFF DEAD, OR OF HURTING YOURSELF: 0
4. FEELING TIRED OR HAVING LITTLE ENERGY: 3
3. TROUBLE FALLING OR STAYING ASLEEP: 3
SUM OF ALL RESPONSES TO PHQ QUESTIONS 1-9: 22
SUM OF ALL RESPONSES TO PHQ9 QUESTIONS 1 & 2: 6

## 2023-07-20 ASSESSMENT — ENCOUNTER SYMPTOMS
SHORTNESS OF BREATH: 0
VOICE CHANGE: 0
TROUBLE SWALLOWING: 0
NAUSEA: 0
PHOTOPHOBIA: 0
ABDOMINAL PAIN: 0
WHEEZING: 0
VOMITING: 0

## 2023-07-20 ASSESSMENT — COLUMBIA-SUICIDE SEVERITY RATING SCALE - C-SSRS
1. WITHIN THE PAST MONTH, HAVE YOU WISHED YOU WERE DEAD OR WISHED YOU COULD GO TO SLEEP AND NOT WAKE UP?: NO
6. HAVE YOU EVER DONE ANYTHING, STARTED TO DO ANYTHING, OR PREPARED TO DO ANYTHING TO END YOUR LIFE?: NO
2. HAVE YOU ACTUALLY HAD ANY THOUGHTS OF KILLING YOURSELF?: NO

## 2023-07-20 NOTE — PROGRESS NOTES
10/05/2022 08:02 AM     TSH:   Lab Results   Component Value Date/Time    TSHREFLEX 3.44 10/05/2022 08:02 AM         ASSESSMENT/PLAN:  Assessment/Plan:  Delmy Cobb was seen today for follow-up and insomnia. Diagnoses and all orders for this visit:    Moderate persistent asthma without complication  Continue Advair  Albuterol as needed  Hyperlipidemia, unspecified hyperlipidemia type  Last LDL 88. Taking atorvastatin.  -     TSH with Reflex; Future  Diet therapeutic lifestyle changes  Depression, unspecified depression type  Not well controlled. Increase Prozac to 40 mg  -     TSH with Reflex; Future  -     FLUoxetine (PROZAC) 40 MG capsule; TAKE 1 CAPSULE BY MOUTH ONCE DAILY      Screening for diabetes mellitus  Family history of diabetes mellitus  Blood glucose was 93    Class 3 severe obesity with serious comorbidity in adult, unspecified BMI, unspecified obesity type (HCC)  -     TSH with Reflex; Future  Diet lifestyle changes. Patient was referred to weight loss clinic in the past.          Orders Placed This Encounter   Procedures    TSH with Reflex     Standing Status:   Future     Number of Occurrences:   1     Standing Expiration Date:   7/20/2024     Current Outpatient Medications   Medication Sig Dispense Refill    FLUoxetine (PROZAC) 40 MG capsule TAKE 1 CAPSULE BY MOUTH ONCE DAILY 30 capsule 2    albuterol sulfate HFA (PROVENTIL;VENTOLIN;PROAIR) 108 (90 Base) MCG/ACT inhaler INHALE TWO (2) PUFFS BY MOUTH EVERY 6 HOURS AS NEEDED FOR WHEEZING 8.5 g 0    zaleplon (SONATA) 10 MG capsule Take 1 capsule by mouth nightly for 60 days. Max Daily Amount: 10 mg 30 capsule 1    tiZANidine (ZANAFLEX) 4 MG tablet TAKE 1 TABLET BY MOUTH 3 TIMES DAILY 90 tablet 0    gabapentin (NEURONTIN) 300 MG capsule Take 1 capsule by mouth nightly for 90 days.  90 capsule 0    ADVAIR DISKUS 250-50 MCG/ACT AEPB diskus inhaler INHALE 1 PUFF BY MOUTH TWICE DAILY IN MORNING AND EVENING 1 each 3    atorvastatin (LIPITOR) 20 MG tablet TAKE

## 2023-07-21 LAB — TSH SERPL DL<=0.005 MIU/L-ACNC: 2.83 UIU/ML (ref 0.27–4.2)

## 2023-07-23 DIAGNOSIS — G89.29 CHRONIC BACK PAIN GREATER THAN 3 MONTHS DURATION: ICD-10-CM

## 2023-07-23 DIAGNOSIS — F51.01 PRIMARY INSOMNIA: ICD-10-CM

## 2023-07-23 DIAGNOSIS — M25.50 ARTHRALGIA OF MULTIPLE JOINTS: ICD-10-CM

## 2023-07-23 DIAGNOSIS — M54.9 CHRONIC BACK PAIN GREATER THAN 3 MONTHS DURATION: ICD-10-CM

## 2023-07-24 RX ORDER — NAPROXEN 500 MG/1
TABLET ORAL
Qty: 60 TABLET | Refills: 10 | OUTPATIENT
Start: 2023-07-24

## 2023-07-24 RX ORDER — HYDROXYZINE HYDROCHLORIDE 25 MG/1
TABLET, FILM COATED ORAL
Qty: 90 TABLET | Refills: 10 | OUTPATIENT
Start: 2023-07-24

## 2023-07-27 ENCOUNTER — TELEPHONE (OUTPATIENT)
Dept: INTERNAL MEDICINE CLINIC | Age: 38
End: 2023-07-27

## 2023-07-27 RX ORDER — NAPROXEN 500 MG/1
500 TABLET ORAL 2 TIMES DAILY WITH MEALS
Qty: 60 TABLET | Refills: 5 | Status: SHIPPED | OUTPATIENT
Start: 2023-07-27

## 2023-07-27 RX ORDER — HYDROXYZINE HYDROCHLORIDE 25 MG/1
TABLET, FILM COATED ORAL
COMMUNITY
Start: 2023-07-03 | End: 2023-07-27 | Stop reason: SDUPTHER

## 2023-07-27 RX ORDER — HYDROXYZINE HYDROCHLORIDE 25 MG/1
25 TABLET, FILM COATED ORAL EVERY 8 HOURS PRN
Qty: 90 TABLET | Refills: 5 | Status: SHIPPED | OUTPATIENT
Start: 2023-07-27

## 2023-07-27 RX ORDER — NAPROXEN 500 MG/1
TABLET ORAL
COMMUNITY
Start: 2023-07-03 | End: 2023-07-27 | Stop reason: SDUPTHER

## 2023-07-27 NOTE — TELEPHONE ENCOUNTER
Pharmacy called for refill  Hydroxyzine 25mg #90     Last fill 1/2023   Naproxen 500 mg tabs  #60    Last fill 1/ 2023     Patient Phone Number: [phone] (home)     Last appt: 7/20/2023   Next appt: 10/20/2023    Last OARRS: No flowsheet data found.

## 2023-08-02 DIAGNOSIS — J45.41 MODERATE PERSISTENT ASTHMA WITH EXACERBATION: ICD-10-CM

## 2023-08-02 DIAGNOSIS — J45.40 MODERATE PERSISTENT ASTHMA WITHOUT COMPLICATION: ICD-10-CM

## 2023-08-02 DIAGNOSIS — E78.5 HYPERLIPIDEMIA, UNSPECIFIED HYPERLIPIDEMIA TYPE: ICD-10-CM

## 2023-08-03 RX ORDER — ALBUTEROL SULFATE 90 UG/1
AEROSOL, METERED RESPIRATORY (INHALATION)
Qty: 8.5 G | Refills: 3 | Status: SHIPPED | OUTPATIENT
Start: 2023-08-03

## 2023-08-03 RX ORDER — FLUTICASONE PROPIONATE AND SALMETEROL 50; 250 UG/1; UG/1
POWDER RESPIRATORY (INHALATION)
Qty: 1 EACH | Refills: 3 | Status: SHIPPED | OUTPATIENT
Start: 2023-08-03

## 2023-08-03 RX ORDER — ATORVASTATIN CALCIUM 20 MG/1
20 TABLET, FILM COATED ORAL DAILY
Qty: 90 TABLET | Refills: 1 | Status: SHIPPED | OUTPATIENT
Start: 2023-08-03

## 2023-08-08 DIAGNOSIS — M54.41 CHRONIC MIDLINE LOW BACK PAIN WITH BILATERAL SCIATICA: ICD-10-CM

## 2023-08-08 DIAGNOSIS — G89.29 CHRONIC MIDLINE LOW BACK PAIN WITH BILATERAL SCIATICA: ICD-10-CM

## 2023-08-08 DIAGNOSIS — M54.42 CHRONIC MIDLINE LOW BACK PAIN WITH BILATERAL SCIATICA: ICD-10-CM

## 2023-08-09 RX ORDER — TIZANIDINE 4 MG/1
TABLET ORAL
Qty: 270 TABLET | Refills: 1 | Status: SHIPPED | OUTPATIENT
Start: 2023-08-09

## 2023-08-31 DIAGNOSIS — G25.81 RESTLESS LEG: ICD-10-CM

## 2023-09-01 ENCOUNTER — TELEPHONE (OUTPATIENT)
Dept: INTERNAL MEDICINE CLINIC | Age: 38
End: 2023-09-01

## 2023-09-01 RX ORDER — GABAPENTIN 300 MG/1
CAPSULE ORAL
Qty: 90 CAPSULE | Refills: 1 | Status: SHIPPED | OUTPATIENT
Start: 2023-09-01 | End: 2024-02-28

## 2023-09-01 NOTE — TELEPHONE ENCOUNTER
----- Message from Amaury Singh sent at 2023 11:08 AM EDT -----  Subject: Message to Provider    QUESTIONS  Information for Provider? Pt has called in to inform Dr. Gerardo Gonsales that her   handicap placard has  and she needs a new script.   ---------------------------------------------------------------------------  --------------  Shania Brennan [facility]  [phone]; OK to leave message on voicemail  ---------------------------------------------------------------------------  --------------  SCRIPT ANSWERS  Relationship to Patient?  Self

## 2023-09-06 DIAGNOSIS — F32.A DEPRESSION, UNSPECIFIED DEPRESSION TYPE: ICD-10-CM

## 2023-09-06 DIAGNOSIS — G25.81 RESTLESS LEG: ICD-10-CM

## 2023-09-06 RX ORDER — GABAPENTIN 300 MG/1
CAPSULE ORAL
Qty: 30 CAPSULE | Refills: 10 | OUTPATIENT
Start: 2023-09-06 | End: 2024-03-04

## 2023-09-06 RX ORDER — FLUOXETINE HYDROCHLORIDE 40 MG/1
CAPSULE ORAL
Qty: 30 CAPSULE | Refills: 2 | Status: SHIPPED | OUTPATIENT
Start: 2023-09-06

## 2023-10-06 DIAGNOSIS — E55.9 VITAMIN D DEFICIENCY: ICD-10-CM

## 2023-10-09 RX ORDER — METHOCARBAMOL 750 MG/1
TABLET ORAL
Qty: 12 CAPSULE | Refills: 3 | Status: SHIPPED | OUTPATIENT
Start: 2023-10-09

## 2023-10-20 ENCOUNTER — OFFICE VISIT (OUTPATIENT)
Dept: INTERNAL MEDICINE CLINIC | Age: 38
End: 2023-10-20
Payer: MEDICARE

## 2023-10-20 VITALS
BODY MASS INDEX: 47.46 KG/M2 | HEART RATE: 71 BPM | DIASTOLIC BLOOD PRESSURE: 64 MMHG | OXYGEN SATURATION: 98 % | WEIGHT: 235 LBS | SYSTOLIC BLOOD PRESSURE: 118 MMHG

## 2023-10-20 DIAGNOSIS — M54.41 CHRONIC MIDLINE LOW BACK PAIN WITH BILATERAL SCIATICA: Primary | ICD-10-CM

## 2023-10-20 DIAGNOSIS — E78.5 HYPERLIPIDEMIA, UNSPECIFIED HYPERLIPIDEMIA TYPE: ICD-10-CM

## 2023-10-20 DIAGNOSIS — J45.40 MODERATE PERSISTENT ASTHMA WITHOUT COMPLICATION: ICD-10-CM

## 2023-10-20 DIAGNOSIS — M54.42 CHRONIC MIDLINE LOW BACK PAIN WITH BILATERAL SCIATICA: Primary | ICD-10-CM

## 2023-10-20 DIAGNOSIS — G25.81 RESTLESS LEG: ICD-10-CM

## 2023-10-20 DIAGNOSIS — E78.01 FAMILIAL HYPERCHOLESTEROLEMIA: ICD-10-CM

## 2023-10-20 DIAGNOSIS — G89.29 CHRONIC MIDLINE LOW BACK PAIN WITH BILATERAL SCIATICA: Primary | ICD-10-CM

## 2023-10-20 PROCEDURE — G8427 DOCREV CUR MEDS BY ELIG CLIN: HCPCS | Performed by: INTERNAL MEDICINE

## 2023-10-20 PROCEDURE — 99214 OFFICE O/P EST MOD 30 MIN: CPT | Performed by: INTERNAL MEDICINE

## 2023-10-20 PROCEDURE — G8417 CALC BMI ABV UP PARAM F/U: HCPCS | Performed by: INTERNAL MEDICINE

## 2023-10-20 PROCEDURE — G8484 FLU IMMUNIZE NO ADMIN: HCPCS | Performed by: INTERNAL MEDICINE

## 2023-10-20 PROCEDURE — 4004F PT TOBACCO SCREEN RCVD TLK: CPT | Performed by: INTERNAL MEDICINE

## 2023-10-20 RX ORDER — GABAPENTIN 300 MG/1
300 CAPSULE ORAL NIGHTLY
Qty: 90 CAPSULE | Refills: 1 | Status: CANCELLED | OUTPATIENT
Start: 2023-10-20 | End: 2024-04-17

## 2023-10-20 RX ORDER — ATORVASTATIN CALCIUM 20 MG/1
20 TABLET, FILM COATED ORAL DAILY
Qty: 90 TABLET | Refills: 1 | Status: SHIPPED | OUTPATIENT
Start: 2023-10-20

## 2023-10-20 SDOH — ECONOMIC STABILITY: INCOME INSECURITY: HOW HARD IS IT FOR YOU TO PAY FOR THE VERY BASICS LIKE FOOD, HOUSING, MEDICAL CARE, AND HEATING?: NOT HARD AT ALL

## 2023-10-20 SDOH — ECONOMIC STABILITY: FOOD INSECURITY: WITHIN THE PAST 12 MONTHS, YOU WORRIED THAT YOUR FOOD WOULD RUN OUT BEFORE YOU GOT MONEY TO BUY MORE.: NEVER TRUE

## 2023-10-20 SDOH — ECONOMIC STABILITY: TRANSPORTATION INSECURITY
IN THE PAST 12 MONTHS, HAS LACK OF TRANSPORTATION KEPT YOU FROM MEETINGS, WORK, OR FROM GETTING THINGS NEEDED FOR DAILY LIVING?: YES

## 2023-10-20 SDOH — ECONOMIC STABILITY: HOUSING INSECURITY
IN THE LAST 12 MONTHS, WAS THERE A TIME WHEN YOU DID NOT HAVE A STEADY PLACE TO SLEEP OR SLEPT IN A SHELTER (INCLUDING NOW)?: NO

## 2023-10-20 SDOH — ECONOMIC STABILITY: FOOD INSECURITY: WITHIN THE PAST 12 MONTHS, THE FOOD YOU BOUGHT JUST DIDN'T LAST AND YOU DIDN'T HAVE MONEY TO GET MORE.: NEVER TRUE

## 2023-10-20 ASSESSMENT — ENCOUNTER SYMPTOMS
VOMITING: 0
NAUSEA: 0
WHEEZING: 0
ABDOMINAL PAIN: 0
SHORTNESS OF BREATH: 0
VOICE CHANGE: 0
TROUBLE SWALLOWING: 0

## 2023-10-20 NOTE — PROGRESS NOTES
Leandro Navarro  1985  female  45 y.o. SUBJECTIVE:       Chief Complaint   Patient presents with    Follow-up     3 month f/u       HPI:  Follow-up visit for chronic problems. Patient denies any new or concerning symptoms. History of anxiety depression which has been in full remission with current fluoxetine. Patient denies any manic symptoms. Denies any suicidal homicidal ideation. Past Medical History:   Diagnosis Date    Asthma     Depression     HS (hereditary spherocytosis) (HCC)     HTN (hypertension)     Thyroid disease      Past Surgical History:   Procedure Laterality Date     SECTION      OTHER SURGICAL HISTORY Right 2018    EXCISION OF SEBACEOUS CYST OF BACK UPPER NECK    PAIN MANAGEMENT PROCEDURE Right 2021    RIGHT L3 TRANSFORAMINAL EPIDURAL STEROID INJECTION WITH FLUOROSCOPY performed by Gavin Armendariz MD at [address] [place] [address] Right 9/15/2022    RIGHT L3 TRANSFORAMINAL EPIDURAL STEROID INJECTION WITH FLUOROSCOPY performed by Gavin Armendariz MD at [address] [place] [address] Bilateral 3/16/2023    BILATERAL L3 TRANSFORAMINAL EPIDURAL STEROID INJECTION WITH FLUOROSCOPY performed by Gavin Armendariz MD at [address] [place] [address] History     Socioeconomic History    Marital status: Single    Number of children: 1   Occupational History    Occupation: / mobile phone expert     Comment: [employer]/on feet 12 hr days   Tobacco Use    Smoking status: Every Day     Packs/day: .4     Types: Cigarettes     Start date: 1998     Passive exposure: Past    Smokeless tobacco: Never   Vaping Use    Vaping Use: Unknown   Substance and Sexual Activity    Alcohol use: Yes    Drug use: No    Sexual activity: Yes     Partners: Male     Birth control/protection: I.U.D.      Social Determinants of Health     Financial Resource Strain: Low Risk  (2022)    Overall Financial Resource Strain (CARDIA)     Difficulty of Paying Living Expenses: Not Left message for patient at with [facility] (P.I)     Telephone Information:   Mobile [phone]    to schedule procedure.  Patient to return call to [facility] Scheduling Patient Line [phone].   ,

## 2023-12-01 DIAGNOSIS — J45.40 MODERATE PERSISTENT ASTHMA WITHOUT COMPLICATION: ICD-10-CM

## 2023-12-01 DIAGNOSIS — F32.A DEPRESSION, UNSPECIFIED DEPRESSION TYPE: ICD-10-CM

## 2023-12-01 DIAGNOSIS — J45.41 MODERATE PERSISTENT ASTHMA WITH EXACERBATION: ICD-10-CM

## 2023-12-04 RX ORDER — FLUTICASONE PROPIONATE AND SALMETEROL 50; 250 UG/1; UG/1
POWDER RESPIRATORY (INHALATION)
Qty: 1 EACH | Refills: 2 | Status: SHIPPED | OUTPATIENT
Start: 2023-12-04

## 2023-12-04 RX ORDER — ALBUTEROL SULFATE 90 UG/1
AEROSOL, METERED RESPIRATORY (INHALATION)
Qty: 8.5 G | Refills: 2 | Status: SHIPPED | OUTPATIENT
Start: 2023-12-04

## 2023-12-04 RX ORDER — FLUOXETINE HYDROCHLORIDE 40 MG/1
CAPSULE ORAL
Qty: 30 CAPSULE | Refills: 2 | Status: SHIPPED | OUTPATIENT
Start: 2023-12-04

## 2023-12-29 DIAGNOSIS — G47.00 INSOMNIA, UNSPECIFIED TYPE: ICD-10-CM

## 2024-01-03 RX ORDER — NAPROXEN 500 MG/1
TABLET ORAL
Qty: 60 TABLET | Refills: 10 | Status: SHIPPED | OUTPATIENT
Start: 2024-01-03

## 2024-01-03 RX ORDER — ZALEPLON 10 MG/1
CAPSULE ORAL
Qty: 30 CAPSULE | Refills: 0 | OUTPATIENT
Start: 2024-01-03

## 2024-01-03 RX ORDER — HYDROXYZINE HYDROCHLORIDE 25 MG/1
25 TABLET, FILM COATED ORAL EVERY 8 HOURS PRN
Qty: 90 TABLET | Refills: 3 | Status: SHIPPED | OUTPATIENT
Start: 2024-01-03

## 2024-01-16 DIAGNOSIS — G25.81 RESTLESS LEG: ICD-10-CM

## 2024-01-16 DIAGNOSIS — E78.01 FAMILIAL HYPERCHOLESTEROLEMIA: ICD-10-CM

## 2024-01-16 DIAGNOSIS — E78.5 HYPERLIPIDEMIA, UNSPECIFIED HYPERLIPIDEMIA TYPE: ICD-10-CM

## 2024-01-16 DIAGNOSIS — J45.40 MODERATE PERSISTENT ASTHMA WITHOUT COMPLICATION: ICD-10-CM

## 2024-01-16 LAB
ALBUMIN SERPL-MCNC: 4.3 G/DL (ref 3.4–5)
ALP SERPL-CCNC: 73 U/L (ref 40–129)
ANION GAP SERPL CALCULATED.3IONS-SCNC: 10 MMOL/L (ref 3–16)
AST SERPL-CCNC: 14 U/L (ref 15–37)
BILIRUB DIRECT SERPL-MCNC: <0.2 MG/DL (ref 0–0.3)
BILIRUB INDIRECT SERPL-MCNC: ABNORMAL MG/DL (ref 0–1)
BILIRUB SERPL-MCNC: 0.3 MG/DL (ref 0–1)
BUN SERPL-MCNC: 8 MG/DL (ref 7–20)
CALCIUM SERPL-MCNC: 9.1 MG/DL (ref 8.3–10.6)
CHLORIDE SERPL-SCNC: 106 MMOL/L (ref 99–110)
CHOLEST SERPL-MCNC: 162 MG/DL (ref 0–199)
CO2 SERPL-SCNC: 23 MMOL/L (ref 21–32)
CREAT SERPL-MCNC: 0.7 MG/DL (ref 0.6–1.1)
DEPRECATED RDW RBC AUTO: 13.5 % (ref 12.4–15.4)
GFR SERPLBLD CREATININE-BSD FMLA CKD-EPI: >60 ML/MIN/{1.73_M2}
GLUCOSE SERPL-MCNC: 86 MG/DL (ref 70–99)
HCT VFR BLD AUTO: 42.8 % (ref 36–48)
HDLC SERPL-MCNC: 44 MG/DL (ref 40–60)
HGB BLD-MCNC: 14.7 G/DL (ref 12–16)
LDL CHOLESTEROL CALCULATED: 94 MG/DL
MCH RBC QN AUTO: 33 PG (ref 26–34)
MCHC RBC AUTO-ENTMCNC: 34.4 G/DL (ref 31–36)
MCV RBC AUTO: 95.9 FL (ref 80–100)
PLATELET # BLD AUTO: 238 K/UL (ref 135–450)
PMV BLD AUTO: 11.7 FL (ref 5–10.5)
POTASSIUM SERPL-SCNC: 4.5 MMOL/L (ref 3.5–5.1)
PROT SERPL-MCNC: 6.7 G/DL (ref 6.4–8.2)
RBC # BLD AUTO: 4.46 M/UL (ref 4–5.2)
SODIUM SERPL-SCNC: 139 MMOL/L (ref 136–145)
TRIGL SERPL-MCNC: 118 MG/DL (ref 0–150)
VLDLC SERPL CALC-MCNC: 24 MG/DL
WBC # BLD AUTO: 9.6 K/UL (ref 4–11)

## 2024-01-17 ENCOUNTER — TELEMEDICINE (OUTPATIENT)
Dept: INTERNAL MEDICINE CLINIC | Age: 39
End: 2024-01-17
Payer: MEDICARE

## 2024-01-17 ENCOUNTER — TELEPHONE (OUTPATIENT)
Dept: PULMONOLOGY | Age: 39
End: 2024-01-17

## 2024-01-17 DIAGNOSIS — E78.01 FAMILIAL HYPERCHOLESTEROLEMIA: ICD-10-CM

## 2024-01-17 DIAGNOSIS — J45.40 MODERATE PERSISTENT ASTHMA WITHOUT COMPLICATION: ICD-10-CM

## 2024-01-17 DIAGNOSIS — G89.29 CHRONIC MIDLINE LOW BACK PAIN WITH BILATERAL SCIATICA: ICD-10-CM

## 2024-01-17 DIAGNOSIS — E78.5 HYPERLIPIDEMIA, UNSPECIFIED HYPERLIPIDEMIA TYPE: ICD-10-CM

## 2024-01-17 DIAGNOSIS — M54.42 CHRONIC MIDLINE LOW BACK PAIN WITH BILATERAL SCIATICA: ICD-10-CM

## 2024-01-17 DIAGNOSIS — J45.41 MODERATE PERSISTENT ASTHMA WITH EXACERBATION: ICD-10-CM

## 2024-01-17 DIAGNOSIS — M54.41 CHRONIC MIDLINE LOW BACK PAIN WITH BILATERAL SCIATICA: ICD-10-CM

## 2024-01-17 DIAGNOSIS — F32.A DEPRESSION, UNSPECIFIED DEPRESSION TYPE: Primary | ICD-10-CM

## 2024-01-17 PROCEDURE — 4004F PT TOBACCO SCREEN RCVD TLK: CPT | Performed by: INTERNAL MEDICINE

## 2024-01-17 PROCEDURE — G8484 FLU IMMUNIZE NO ADMIN: HCPCS | Performed by: INTERNAL MEDICINE

## 2024-01-17 PROCEDURE — G8427 DOCREV CUR MEDS BY ELIG CLIN: HCPCS | Performed by: INTERNAL MEDICINE

## 2024-01-17 PROCEDURE — G8417 CALC BMI ABV UP PARAM F/U: HCPCS | Performed by: INTERNAL MEDICINE

## 2024-01-17 PROCEDURE — 99214 OFFICE O/P EST MOD 30 MIN: CPT | Performed by: INTERNAL MEDICINE

## 2024-01-17 RX ORDER — FLUTICASONE PROPIONATE AND SALMETEROL 50; 250 UG/1; UG/1
POWDER RESPIRATORY (INHALATION)
Qty: 1 EACH | Refills: 5 | Status: SHIPPED | OUTPATIENT
Start: 2024-01-17

## 2024-01-17 RX ORDER — IBUPROFEN 600 MG/1
600 TABLET ORAL 3 TIMES DAILY PRN
Qty: 90 TABLET | Refills: 0 | Status: SHIPPED | OUTPATIENT
Start: 2024-01-17 | End: 2024-02-16

## 2024-01-17 ASSESSMENT — PATIENT HEALTH QUESTIONNAIRE - PHQ9
1. LITTLE INTEREST OR PLEASURE IN DOING THINGS: 2
SUM OF ALL RESPONSES TO PHQ QUESTIONS 1-9: 4
7. TROUBLE CONCENTRATING ON THINGS, SUCH AS READING THE NEWSPAPER OR WATCHING TELEVISION: 0
3. TROUBLE FALLING OR STAYING ASLEEP: 0
8. MOVING OR SPEAKING SO SLOWLY THAT OTHER PEOPLE COULD HAVE NOTICED. OR THE OPPOSITE, BEING SO FIGETY OR RESTLESS THAT YOU HAVE BEEN MOVING AROUND A LOT MORE THAN USUAL: 0
SUM OF ALL RESPONSES TO PHQ QUESTIONS 1-9: 4
6. FEELING BAD ABOUT YOURSELF - OR THAT YOU ARE A FAILURE OR HAVE LET YOURSELF OR YOUR FAMILY DOWN: 0
2. FEELING DOWN, DEPRESSED OR HOPELESS: 1
9. THOUGHTS THAT YOU WOULD BE BETTER OFF DEAD, OR OF HURTING YOURSELF: 0
SUM OF ALL RESPONSES TO PHQ QUESTIONS 1-9: 4
4. FEELING TIRED OR HAVING LITTLE ENERGY: 0
10. IF YOU CHECKED OFF ANY PROBLEMS, HOW DIFFICULT HAVE THESE PROBLEMS MADE IT FOR YOU TO DO YOUR WORK, TAKE CARE OF THINGS AT HOME, OR GET ALONG WITH OTHER PEOPLE: 1
5. POOR APPETITE OR OVEREATING: 1
SUM OF ALL RESPONSES TO PHQ9 QUESTIONS 1 & 2: 3
SUM OF ALL RESPONSES TO PHQ QUESTIONS 1-9: 4

## 2024-01-17 ASSESSMENT — ENCOUNTER SYMPTOMS
WHEEZING: 0
VOMITING: 0
NAUSEA: 0
PHOTOPHOBIA: 0
TROUBLE SWALLOWING: 0
SHORTNESS OF BREATH: 0
VOICE CHANGE: 0
ABDOMINAL PAIN: 0

## 2024-01-17 NOTE — PROGRESS NOTES
Diagnosis: [x] GRECIA  (G47.33) [] CSA (G47.31) []  Other:__________________   Length of Need: [] 13 months [x]  99 Months                                          []  Other:__________________   Machine (LAURENT): [] Respironics Auto (with modem for remote monitoring)       [] Other:____________________    [x]  ResMed Auto (with modem for remote monitoring)    [x]  CPAP () [] Bilevel ()   Mode: Mode:   [x] Auto [] Fixed [] Auto [] Spontaneous   Pmin:_____5____cmH2O      Pmax:_____15____cmH2O   P:_________cmH2O    EPAPmin:__________cmH2O IPAP:__________cmH2O     IPAPmax:__________cmH2O EPAP:__________cmH2O     PSmin:_______  PSmax:_______       (ResMed) PS:_________     Flex/EPR - 3 full time                          Ramp time: 30 min Flex/EPR - 3 full time                 Ramp time: 30 min   Ramp Pressure:_____4______cmH2O Ramp Pressure:____________cmH2O         Humidifier: [x] Heated ()                                               [] Passive     [x] Water chamber replacement ()/ 1 per 6 months   Mask:   [x] Nasal () /1 per 3 months [] Full Face () /1 per 3 months   [x] Patient choice -Size and fit mask [] Patient Choice - Size and fit mask   [x] Dispense: nasal cushion  [] Dispense:    [] Dispense:    [x] Headgear () / 1 per 3 months [] Headgear () / 1 per 3 months   [x] Replacement Nasal Cushion ()/2 per month [] Interface Replacement ()/1 per month   [] Replacement Nasal Pillows ()/2 per month       Tubing: [x] Heated ()/1 per 3 months                           [] Standard ()/1 per 3 months  [] Other:____________________   Filters: [x] Non-disposable ()/1 per 6 months                 [x] Ultra-Fine, Disposable ()/2 per month     Miscellaneous: [] Chin Strap ()/ 1 per 6months      [] Other:__________________________________         Start Order Date: 01/17/24    MEDICAL JUSTIFICATION:  I, the undersigned, certify that the above prescribed

## 2024-01-17 NOTE — PROGRESS NOTES
2024    TELEHEALTH EVALUATION -- Audio/Visual    HPI:    Brigette Betancourt (:  1985) has requested an audio/video evaluation for the following concern(s):  Chief Complaint   Patient presents with    Follow-up     3 month f/u     Depression     Winter /seasonal       Follow-up visit.  History of chronic back pain.  Patient report her previous physician is no longer practicing.  Requesting a new pain management specialist for chronic back pain.  She continued to complain of back pain radiate to the both posterolateral thigh.    History of asthma.  With recent weather since she feels may be slightly worse than baseline however not bad enough.  Patient denies any night symptoms.  She continues to use Advair regularly.  Denies increasing shortness of breath chest pain nausea vomiting or systemic symptoms.    Review of Systems   Constitutional:  Negative for diaphoresis and unexpected weight change.   HENT:  Negative for trouble swallowing and voice change.    Eyes:  Negative for photophobia and visual disturbance.   Respiratory:  Negative for shortness of breath and wheezing.    Cardiovascular:  Negative for chest pain and palpitations.   Gastrointestinal:  Negative for abdominal pain, nausea and vomiting.   Endocrine: Negative for polyphagia and polyuria.   Neurological:  Negative for dizziness, light-headedness and headaches.   Psychiatric/Behavioral:  Negative for dysphoric mood, hallucinations and suicidal ideas.        Current Outpatient Medications on File Prior to Visit   Medication Sig Dispense Refill    hydrOXYzine HCl (ATARAX) 25 MG tablet TAKE 1 TABLET BY MOUTH EVERY 8 HOURS AS NEEDED FOR ITCHING 90 tablet 3    zaleplon (SONATA) 10 MG capsule Take 1 capsule by mouth nightly for 30 days. Max Daily Amount: 10 mg 30 capsule 0    FLUoxetine (PROZAC) 40 MG capsule TAKE 1 CAPSULE BY MOUTH ONCE DAILY 30 capsule 2    albuterol sulfate HFA (PROVENTIL;VENTOLIN;PROAIR) 108 (90 Base) MCG/ACT inhaler INHALE TWO (2)

## 2024-01-25 ENCOUNTER — TELEPHONE (OUTPATIENT)
Dept: INTERNAL MEDICINE CLINIC | Age: 39
End: 2024-01-25

## 2024-01-25 NOTE — TELEPHONE ENCOUNTER
Patient calling in requesting pain management referral made on 1/17/24 be faxed to Dr. Shipman's office.    Referral and face sheet faxed per request to [phone]

## 2024-01-31 DIAGNOSIS — G89.29 CHRONIC MIDLINE LOW BACK PAIN WITH BILATERAL SCIATICA: ICD-10-CM

## 2024-01-31 DIAGNOSIS — M54.42 CHRONIC MIDLINE LOW BACK PAIN WITH BILATERAL SCIATICA: ICD-10-CM

## 2024-01-31 DIAGNOSIS — M54.41 CHRONIC MIDLINE LOW BACK PAIN WITH BILATERAL SCIATICA: ICD-10-CM

## 2024-02-01 DIAGNOSIS — M54.41 CHRONIC MIDLINE LOW BACK PAIN WITH BILATERAL SCIATICA: ICD-10-CM

## 2024-02-01 DIAGNOSIS — M54.42 CHRONIC MIDLINE LOW BACK PAIN WITH BILATERAL SCIATICA: ICD-10-CM

## 2024-02-01 DIAGNOSIS — G89.29 CHRONIC MIDLINE LOW BACK PAIN WITH BILATERAL SCIATICA: ICD-10-CM

## 2024-02-01 RX ORDER — TIZANIDINE 4 MG/1
TABLET ORAL
Qty: 90 TABLET | Refills: 0 | Status: SHIPPED | OUTPATIENT
Start: 2024-02-01

## 2024-02-02 RX ORDER — IBUPROFEN 600 MG/1
600 TABLET ORAL 3 TIMES DAILY PRN
Qty: 90 TABLET | Refills: 0 | Status: SHIPPED | OUTPATIENT
Start: 2024-02-02

## 2024-02-19 NOTE — TELEPHONE ENCOUNTER
Called patient to advise. Spoke with pt and nurse verbalized provider does not do telephone visit. Pt stated understanding and hung up the phone.

## 2024-02-29 DIAGNOSIS — G89.29 CHRONIC MIDLINE LOW BACK PAIN WITH BILATERAL SCIATICA: ICD-10-CM

## 2024-02-29 DIAGNOSIS — M54.41 CHRONIC MIDLINE LOW BACK PAIN WITH BILATERAL SCIATICA: ICD-10-CM

## 2024-02-29 DIAGNOSIS — F32.A DEPRESSION, UNSPECIFIED DEPRESSION TYPE: ICD-10-CM

## 2024-02-29 DIAGNOSIS — M54.42 CHRONIC MIDLINE LOW BACK PAIN WITH BILATERAL SCIATICA: ICD-10-CM

## 2024-03-01 DIAGNOSIS — J45.40 MODERATE PERSISTENT ASTHMA WITHOUT COMPLICATION: ICD-10-CM

## 2024-03-01 RX ORDER — FLUOXETINE HYDROCHLORIDE 40 MG/1
CAPSULE ORAL
Qty: 90 CAPSULE | Refills: 0 | Status: SHIPPED | OUTPATIENT
Start: 2024-03-01

## 2024-03-01 RX ORDER — IBUPROFEN 600 MG/1
600 TABLET ORAL 3 TIMES DAILY PRN
Qty: 90 TABLET | Refills: 0 | Status: SHIPPED | OUTPATIENT
Start: 2024-03-01

## 2024-03-01 RX ORDER — TIZANIDINE 4 MG/1
TABLET ORAL
Qty: 90 TABLET | Refills: 2 | Status: SHIPPED | OUTPATIENT
Start: 2024-03-01

## 2024-03-04 RX ORDER — ALBUTEROL SULFATE 90 UG/1
2 AEROSOL, METERED RESPIRATORY (INHALATION) EVERY 6 HOURS PRN
Qty: 3 EACH | Refills: 1 | Status: SHIPPED | OUTPATIENT
Start: 2024-03-04

## 2024-03-15 NOTE — PRE-PROCEDURE INSTRUCTIONS
PATIENT REACHED   YES____NO_X___        PREOP INSTRUCTIONS LEFT ON VM NUMBER: [phone]        Date: 3/19/2024      Arrival Time: 7:45 AM      Procedure Time: 8:45 AM      Location: [address] [place] [address]. [place] [address]      Nothing to eat or drink after MIDNIGHT the evening before your procedure.    You will need a responsible adulte 18 years or older to stay on site, and take you home after the procedure.    Please bring a complete list of medications and supplements you currently take, with dosing.    Wear loose comfortable clothes.    Please follow any and all instructions the office has given you regarding medications that need to be stopped prior to procedure.     Please verify with the office regarding blood thinners.    The goal of blood sugar is to be under >200, the day of the procedure. If it is elevated you may be cancelled.    ANY QUESTIONS CALL YOUR DOCTOR.ALSO,PLEASE READ THE INSTRUCTION PACKET FROM YOUR DR IF YOU RECEIVED ONE.    DR. CLEMENS > [phone]      Additional Information:      VISITOR POLICY(subject to change)    Current policy is 2 visitors per patient. No children under 18. Masks are optional.

## 2024-03-19 ENCOUNTER — PATIENT MESSAGE (OUTPATIENT)
Dept: INTERNAL MEDICINE CLINIC | Age: 39
End: 2024-03-19

## 2024-03-19 ENCOUNTER — APPOINTMENT (OUTPATIENT)
Dept: GENERAL RADIOLOGY | Age: 39
End: 2024-03-19
Attending: ANESTHESIOLOGY
Payer: MEDICARE

## 2024-03-19 ENCOUNTER — HOSPITAL ENCOUNTER (OUTPATIENT)
Age: 39
Setting detail: OUTPATIENT SURGERY
Discharge: HOME | End: 2024-03-19
Attending: ANESTHESIOLOGY | Admitting: ANESTHESIOLOGY
Payer: MEDICARE

## 2024-03-19 VITALS
BODY MASS INDEX: 47.37 KG/M2 | SYSTOLIC BLOOD PRESSURE: 117 MMHG | DIASTOLIC BLOOD PRESSURE: 79 MMHG | TEMPERATURE: 97 F | HEART RATE: 73 BPM | HEIGHT: 59 IN | WEIGHT: 235 LBS | OXYGEN SATURATION: 99 % | RESPIRATION RATE: 16 BRPM

## 2024-03-19 PROCEDURE — 2500000003 HC RX 250 WO HCPCS: Performed by: ANESTHESIOLOGY

## 2024-03-19 PROCEDURE — 6360000002 HC RX W HCPCS: Performed by: ANESTHESIOLOGY

## 2024-03-19 PROCEDURE — 2709999900 HC NON-CHARGEABLE SUPPLY: Performed by: ANESTHESIOLOGY

## 2024-03-19 PROCEDURE — 3610000056 HC PAIN LEVEL 4 BASE (NON-OR): Performed by: ANESTHESIOLOGY

## 2024-03-19 PROCEDURE — 77003 FLUOROGUIDE FOR SPINE INJECT: CPT

## 2024-03-19 PROCEDURE — 99152 MOD SED SAME PHYS/QHP 5/>YRS: CPT | Performed by: ANESTHESIOLOGY

## 2024-03-19 RX ORDER — LIDOCAINE HYDROCHLORIDE 10 MG/ML
INJECTION, SOLUTION EPIDURAL; INFILTRATION; INTRACAUDAL; PERINEURAL
Status: COMPLETED | OUTPATIENT
Start: 2024-03-19 | End: 2024-03-19

## 2024-03-19 RX ORDER — MIDAZOLAM HYDROCHLORIDE 1 MG/ML
INJECTION INTRAMUSCULAR; INTRAVENOUS
Status: COMPLETED | OUTPATIENT
Start: 2024-03-19 | End: 2024-03-19

## 2024-03-19 RX ORDER — DEXAMETHASONE SODIUM PHOSPHATE 10 MG/ML
INJECTION, SOLUTION INTRAMUSCULAR; INTRAVENOUS
Status: COMPLETED | OUTPATIENT
Start: 2024-03-19 | End: 2024-03-19

## 2024-03-19 RX ORDER — FENTANYL CITRATE 50 UG/ML
INJECTION, SOLUTION INTRAMUSCULAR; INTRAVENOUS
Status: COMPLETED | OUTPATIENT
Start: 2024-03-19 | End: 2024-03-19

## 2024-03-19 ASSESSMENT — PAIN - FUNCTIONAL ASSESSMENT
PAIN_FUNCTIONAL_ASSESSMENT: PREVENTS OR INTERFERES SOME ACTIVE ACTIVITIES AND ADLS
PAIN_FUNCTIONAL_ASSESSMENT: NONE - DENIES PAIN
PAIN_FUNCTIONAL_ASSESSMENT: NONE - DENIES PAIN
PAIN_FUNCTIONAL_ASSESSMENT: 0-10

## 2024-03-19 ASSESSMENT — PAIN DESCRIPTION - DESCRIPTORS: DESCRIPTORS: BURNING;TINGLING

## 2024-03-19 NOTE — BRIEF OP NOTE
Brief Postoperative Note      Patient: Brigette Betancourt  YOB: 1985  MRN: [id number]    Date of Procedure: 3/19/2024    Pre-Op Diagnosis Codes:     * Spinal stenosis, lumbar region, without neurogenic claudication [M48.061]    Post-Op Diagnosis: Same       Procedure(s):  BILATERAL L3 SELECTIVE NERVE ROOT BLOCK WITH FLUOROSCOPY    Surgeon(s):  Pola Shipman MD    Assistant:  * No surgical staff found *    Anesthesia: IV Sedation    Estimated Blood Loss (mL): Minimal    Complications: None    Specimens:   * No specimens in log *    Implants:  * No implants in log *      Drains: * No LDAs found *    Findings:       Electronically signed by POLA SHIPMAN MD on 3/19/2024 at 9:02 AM

## 2024-03-19 NOTE — PROGRESS NOTES
IV discontinued, catheter intact, and dressing applied.    Procedural dressing dry and intact.    Bilateral lower extremities equal in strength.    Discharge instructions reviewed with patient or responsible adult, signed and copy given.  All home medications have been reviewed.  All questions answered and patient or responsible adult verbalized understanding.  PAIN LEVEL AT DISCHARGE ___0__

## 2024-03-19 NOTE — DISCHARGE INSTRUCTIONS
1. Keep injection /surgical site dry until the band-aid/ dressing is removed. Please remove band-aids from the injection site 12-24 hours after the procedure.     2. If the injection site is sore, you may apply an ice pack to that area for twenty minutes every two hours for the initial twenty four hours. Do not use heat.    3. Occasionally you may notice slight increase in pain after the procedure. This should start to improve  within the next twenty four to forty eight hours.    4. Remember, it may take as long as forty eight hours before you notice a gradual improvement in your pain and other symptoms.    5. You may continue to take your pain medication as needed.    6. DO NOT stop any other previously prescribed medications. You may resume blood thinning medications the day after the procedure.     7. In general, you should avoid any prolonged standing, walking, or repeated bending or heavy use of your upper extremities for twenty four to forty eight hours but may resume light      Normal activity when you leave the facility     8. If you are currently going to physical therapy, continue to do so unless your doctor instructs you not to.    9. If you develop any other symptoms such fever, rash, unusual drainage from the site, severe headache or weakness please call [phone].    10. Other: If you had sedation ; Do not drink alcohol or sign any legal or financial contracts for 24 hours. Also do not drive or operate other types of         machinery for 24 hours. You should have a responsible adult available to assist you for the rest of the day. Side effects include lightheadedness, dizziness and sleepiness.    11. Side effects of steroids can include: (these may last a few days then will go away on their own)           - facial flushing           - hot flashes           - nervous energy and difficulty sleeping at night           - muscle spasm or twitching           - fluid retention            - elevated blood  sugar levels in diabetics      12. Please follow up with the provider whom referred you.

## 2024-03-19 NOTE — OP NOTE
Operative Note      Patient: Brigette Betancourt  YOB: 1985  MRN: [id number]    Date of Procedure: 3/19/2024    Pre-Op Diagnosis Codes:     * Spinal stenosis, lumbar region, without neurogenic claudication [M48.061]    Post-Op Diagnosis: Same       Procedure(s):  BILATERAL L3 SELECTIVE NERVE ROOT BLOCK WITH FLUOROSCOPY    Surgeon(s):  Kirby Shipman MD    Assistant:   * No surgical staff found *    Anesthesia: IV Sedation    Estimated Blood Loss (mL): Minimal    Complications: None    Specimens:   * No specimens in log *    Implants:  * No implants in log *      Drains: * No LDAs found *    Findings:         Detailed Description of Procedure:   Procedure in Detail:   The patient's chart was reviewed. After obtaining written informed consent, the patient had a 20 g IV placed, the patient was placed in the prone position with the leg slightly flexed. Versed and Fentanyl was given to the patient intravenous, monitors attached.  Pre-procedure blood pressure and pulse were stable and recorded in patients clinic chart.       MEDICAL NECESSITY:     This patient has chronic pain that has failed to respond to conservative measures as outlined in the original history and last physical exam.   The patient's symptoms include Pain and disability of a moderate to severe degree with intermittent refereed pain. The goals of treatment are to   1) Achieve optimal pain control, recognizing that a pain-free state may not be achievable;   2) Minimize adverse outcomes;   3) Enhance functional abilities, and physical and psychological well-being; and   4) Enhance the quality of life for patients with chronic pain.  The patient has documented pathology through radiographic imaging, the patient has the same or similar procedure in the past which resulted in significant improvement more than 50% reduction in the pain, as well improvement in their Activity of daily living and quality of life, and this would be the goal for the first

## 2024-03-19 NOTE — H&P
Update History & Physical    The patient's History and Physical was completed on a paper form on 3-. The surgical site was confirmed by the patient and me.       Plan: The risks, benefits, expected outcome, and alternative to the recommended procedure have been discussed with the patient. Patient understands and wants to proceed with the procedure.     Electronically signed by POLA CLMEENS MD on 3/19/2024 at 9:01 AM

## 2024-03-22 ENCOUNTER — OFFICE VISIT (OUTPATIENT)
Dept: INTERNAL MEDICINE CLINIC | Age: 39
End: 2024-03-22
Payer: MEDICARE

## 2024-03-22 VITALS
HEART RATE: 74 BPM | WEIGHT: 248 LBS | OXYGEN SATURATION: 95 % | BODY MASS INDEX: 50.09 KG/M2 | SYSTOLIC BLOOD PRESSURE: 124 MMHG | DIASTOLIC BLOOD PRESSURE: 78 MMHG

## 2024-03-22 DIAGNOSIS — J45.41 MODERATE PERSISTENT ASTHMA WITH EXACERBATION: ICD-10-CM

## 2024-03-22 DIAGNOSIS — E78.01 FAMILIAL HYPERCHOLESTEROLEMIA: ICD-10-CM

## 2024-03-22 DIAGNOSIS — G25.81 RESTLESS LEG: ICD-10-CM

## 2024-03-22 DIAGNOSIS — J45.901 EXACERBATION OF PERSISTENT ASTHMA, UNSPECIFIED ASTHMA SEVERITY: Primary | ICD-10-CM

## 2024-03-22 DIAGNOSIS — E78.5 HYPERLIPIDEMIA, UNSPECIFIED HYPERLIPIDEMIA TYPE: ICD-10-CM

## 2024-03-22 PROCEDURE — 99214 OFFICE O/P EST MOD 30 MIN: CPT | Performed by: INTERNAL MEDICINE

## 2024-03-22 RX ORDER — AZITHROMYCIN 250 MG/1
TABLET, FILM COATED ORAL
Qty: 6 TABLET | Refills: 0 | Status: SHIPPED | OUTPATIENT
Start: 2024-03-22 | End: 2024-04-01

## 2024-03-22 RX ORDER — FLUTICASONE PROPIONATE AND SALMETEROL 250; 50 UG/1; UG/1
1 POWDER RESPIRATORY (INHALATION) EVERY 12 HOURS
Qty: 60 EACH | Refills: 3 | Status: SHIPPED | OUTPATIENT
Start: 2024-03-22

## 2024-03-22 RX ORDER — PREDNISONE 20 MG/1
20 TABLET ORAL 2 TIMES DAILY
Qty: 14 TABLET | Refills: 0 | Status: SHIPPED | OUTPATIENT
Start: 2024-03-22 | End: 2024-03-29

## 2024-03-22 RX ORDER — FLUTICASONE PROPIONATE AND SALMETEROL 50; 250 UG/1; UG/1
POWDER RESPIRATORY (INHALATION)
Qty: 1 EACH | Refills: 5 | Status: SHIPPED | OUTPATIENT
Start: 2024-03-22 | End: 2024-03-22

## 2024-03-22 ASSESSMENT — ENCOUNTER SYMPTOMS
COUGH: 1
VOMITING: 0
BACK PAIN: 1
SHORTNESS OF BREATH: 0
WHEEZING: 1
PHOTOPHOBIA: 0
NAUSEA: 0

## 2024-03-22 NOTE — PROGRESS NOTES
Brigette Betancourt  1985  female  39 y.o.    SUBJECTIVE:       Chief Complaint   Patient presents with    Follow-up     Need advair changed not made anymore       HPI:  Follow-up visit for chronic problems.    Because of recent weather change and season changes patient is complaining of having increasing cough, shortness of breath and wheezing.  Patient reports she has been unable to refill her Advair.  She told her insurance told her drug companies do not make Advair anymore.  Status post epidural injection.  Patient report no significant relief of symptoms.    History of obstructive sleep apnea.  Unfortunately patient reports she has been having difficulty to keep appointment with sleep clinic and get her CPAP.  She continues to suffer from insomnia.    Past Medical History:   Diagnosis Date    Anxiety nemo    not sure    Asthma     Depression     HS (hereditary spherocytosis) (HCC)     HTN (hypertension)     Obesity nemo    as a kid    Restless legs syndrome nemo    before dr marie    Thyroid disease      Past Surgical History:   Procedure Laterality Date     SECTION      OTHER SURGICAL HISTORY Right 2018    EXCISION OF SEBACEOUS CYST OF BACK UPPER NECK    PAIN MANAGEMENT PROCEDURE Right 2021    RIGHT L3 TRANSFORAMINAL EPIDURAL STEROID INJECTION WITH FLUOROSCOPY performed by Cem Gore MD at [facility]    PAIN MANAGEMENT PROCEDURE Right 9/15/2022    RIGHT L3 TRANSFORAMINAL EPIDURAL STEROID INJECTION WITH FLUOROSCOPY performed by Cem Gore MD at [facility]    PAIN MANAGEMENT PROCEDURE Bilateral 3/16/2023    BILATERAL L3 TRANSFORAMINAL EPIDURAL STEROID INJECTION WITH FLUOROSCOPY performed by Cem Gore MD at [facility]    PAIN MANAGEMENT PROCEDURE Bilateral 3/19/2024    BILATERAL L3 SELECTIVE NERVE ROOT BLOCK WITH FLUOROSCOPY performed by Kirby Shipman MD at [facility]     Social History     Socioeconomic History    Marital status: Single    Number of children: 1   Occupational History

## 2024-03-25 ENCOUNTER — TELEPHONE (OUTPATIENT)
Dept: PULMONOLOGY | Age: 39
End: 2024-03-25

## 2024-03-25 DIAGNOSIS — M54.42 CHRONIC MIDLINE LOW BACK PAIN WITH BILATERAL SCIATICA: Primary | ICD-10-CM

## 2024-03-25 DIAGNOSIS — G89.29 CHRONIC MIDLINE LOW BACK PAIN WITH BILATERAL SCIATICA: Primary | ICD-10-CM

## 2024-03-25 DIAGNOSIS — M54.41 CHRONIC MIDLINE LOW BACK PAIN WITH BILATERAL SCIATICA: Primary | ICD-10-CM

## 2024-03-25 DIAGNOSIS — M54.10 RADICULAR LOW BACK PAIN: ICD-10-CM

## 2024-03-25 NOTE — TELEPHONE ENCOUNTER
Pt called in stating that she is having a hard time getting ahold of [facility]. Pt stated that at first in January, there was an issue with her insurance but has not heard anything since then. Pt was told that it would be better for her to be fitted but pt is tired of dealing with [employer]. Pt is looking for an update on her order. Pt requested OV notes to be sent to her house so she could resubmit disability claim. Verified pt address and will send OV notes.     Ph.[phone]

## 2024-03-26 ENCOUNTER — TELEPHONE (OUTPATIENT)
Dept: PULMONOLOGY | Age: 39
End: 2024-03-26

## 2024-03-26 NOTE — TELEPHONE ENCOUNTER
Per Dr. Liang, request for Lunesta 3mg is denied. No prior correspondence, communication, or discussions have been addressed to prescribe medication at this time. Patient's pap orders sent [facility] per patient's request. Patient next appt. 6/12/2024, if patient would like to discuss medication therapy sooner she would need to schedule an office visit with Dr. Liang.

## 2024-03-26 NOTE — TELEPHONE ENCOUNTER
Pt called wanting a refill on Rx Eszopiclone 3 mg sent to pharmacy on file. Please notify pt when Rx is sent.      PH: [phone], can call anytime after 4 pm

## 2024-03-26 NOTE — TELEPHONE ENCOUNTER
Spoke with pt.  Pt states she has issues with [employer] and prefers to use another DME.. Spoke with [facility] and per Ayse pt was a no show. Per pt she was at the [facility] office and was treated \"rudely\"  Order to be sent to [facility].

## 2024-03-29 ENCOUNTER — TELEPHONE (OUTPATIENT)
Dept: INTERNAL MEDICINE CLINIC | Age: 39
End: 2024-03-29

## 2024-03-29 DIAGNOSIS — G89.29 CHRONIC MIDLINE LOW BACK PAIN WITH BILATERAL SCIATICA: ICD-10-CM

## 2024-03-29 DIAGNOSIS — M54.42 CHRONIC MIDLINE LOW BACK PAIN WITH BILATERAL SCIATICA: ICD-10-CM

## 2024-03-29 DIAGNOSIS — M54.41 CHRONIC MIDLINE LOW BACK PAIN WITH BILATERAL SCIATICA: ICD-10-CM

## 2024-03-29 NOTE — TELEPHONE ENCOUNTER
3/28/24    Pt calling in on 3/28/24 asking if notes from her dermatologist can be faxed to this office and she will be able to pick them up.    She states she applied for disability for a dermatological condition which was denied and patient is going to appeal that decision. She won't get that info mailed for a few weeks and asked if that info could be faxed to this office.    Patient was told yes.

## 2024-04-01 ENCOUNTER — TELEPHONE (OUTPATIENT)
Dept: INTERNAL MEDICINE CLINIC | Age: 39
End: 2024-04-01

## 2024-04-01 RX ORDER — IBUPROFEN 600 MG/1
600 TABLET ORAL 3 TIMES DAILY PRN
Qty: 90 TABLET | Refills: 1 | Status: SHIPPED | OUTPATIENT
Start: 2024-04-01

## 2024-04-01 NOTE — TELEPHONE ENCOUNTER
Patient calling, stated that paperwork from [facility] dermatologist was faxed over las week for patient to . She stated that this paperwork is office notes stating her health condition from Dr. Flavio Ho. Patient stated she would call [facility] again to have them fax over paperwork again. Please advise

## 2024-04-01 NOTE — TELEPHONE ENCOUNTER
----- Message from Raheel Kamara sent at 3/29/2024  2:24 PM EDT -----  Regarding: ECC Message to Provider  ECC Message to Provider    Relationship to Patient: Self     Additional Information Patient is asking if you have received the FAX to send for her SSDI  --------------------------------------------------------------------------------------------------------------------------    Call Back Information: OK to leave message on voicemail  Preferred Call Back Number: Phone [phone]

## 2024-04-01 NOTE — TELEPHONE ENCOUNTER
LVM for the patient. I checked with Dr. Ramos's team and verified that we have not received the SSDI paperwork.

## 2024-04-01 NOTE — TELEPHONE ENCOUNTER
Medication:   Requested Prescriptions     Pending Prescriptions Disp Refills    ibuprofen (ADVIL;MOTRIN) 600 MG tablet [Pharmacy Med Name: IBUPROFEN 600 MG TABS 600 Tablet] 90 tablet 1     Sig: Take 1 tablet by mouth 3 times daily as needed for Pain        Last Filled:  3/1/24    Patient Phone Number: [phone] (home) [phone] (work)    Last appt: 3/22/2024   Next appt: 5/22/2024    Last OARRS:        No data to display

## 2024-04-02 ENCOUNTER — TELEPHONE (OUTPATIENT)
Dept: PULMONOLOGY | Age: 39
End: 2024-04-02

## 2024-04-02 NOTE — TELEPHONE ENCOUNTER
Pt called in stating that she spoke with [facility] regarding her new machine and they informed pt that she did not qualify because she was only at 3% and she needed to be at 4%. Pt as well as [facility] would like to know if it could be changed to 4% so pt would be compliant.     Ph.[phone]

## 2024-04-04 NOTE — TELEPHONE ENCOUNTER
Spoke with Bela at [facility], informed that patient's hypopnea score was at 3.4 percent and in order for patient to possibly qualify for services needs to be rescored.

## 2024-04-04 NOTE — TELEPHONE ENCOUNTER
Has been rescored using 4% (this was likely just a documentation error on the previous report).  Patient still meets criteria for mild obstructive sleep apnea.  Sleep study report has been updated and updated order for CPAP has been routed to [facility].    Luis Forte MD

## 2024-04-24 ENCOUNTER — TELEPHONE (OUTPATIENT)
Dept: PULMONOLOGY | Age: 39
End: 2024-04-24

## 2024-04-24 NOTE — TELEPHONE ENCOUNTER
Spoke with Raheel at [facility] stated they are still missing HST results. Faxed HST results to [phone] with a confirmation of receipt. Raheel stated order can now be processed and will call patient to schedule an appt for mask fitting. LVM for patient to inform her of update.

## 2024-04-24 NOTE — TELEPHONE ENCOUNTER
Pt called in stating that she received a message a couple weeks ago stating that [facility] is still waiting on an order. Informed pt that an order was sent on 4/4. Pt confirmed that she has still not heard anything from [facility] regarding her order.     Ph.[phone]

## 2024-04-30 RX ORDER — HYDROXYZINE HYDROCHLORIDE 25 MG/1
25 TABLET, FILM COATED ORAL EVERY 8 HOURS PRN
Qty: 90 TABLET | Refills: 1 | Status: SHIPPED | OUTPATIENT
Start: 2024-04-30

## 2024-05-03 ENCOUNTER — HOSPITAL ENCOUNTER (OUTPATIENT)
Dept: MRI IMAGING | Age: 39
Discharge: HOME | End: 2024-05-03
Attending: PHYSICAL MEDICINE & REHABILITATION
Payer: MEDICARE

## 2024-05-03 DIAGNOSIS — M54.16 LUMBAR RADICULOPATHY: ICD-10-CM

## 2024-05-03 DIAGNOSIS — M54.50 LOW BACK PAIN, UNSPECIFIED BACK PAIN LATERALITY, UNSPECIFIED CHRONICITY, UNSPECIFIED WHETHER SCIATICA PRESENT: ICD-10-CM

## 2024-05-03 PROCEDURE — 72148 MRI LUMBAR SPINE W/O DYE: CPT

## 2024-05-29 DIAGNOSIS — M54.42 CHRONIC MIDLINE LOW BACK PAIN WITH BILATERAL SCIATICA: ICD-10-CM

## 2024-05-29 DIAGNOSIS — F32.A DEPRESSION, UNSPECIFIED DEPRESSION TYPE: ICD-10-CM

## 2024-05-29 DIAGNOSIS — G89.29 CHRONIC MIDLINE LOW BACK PAIN WITH BILATERAL SCIATICA: ICD-10-CM

## 2024-05-29 DIAGNOSIS — M54.41 CHRONIC MIDLINE LOW BACK PAIN WITH BILATERAL SCIATICA: ICD-10-CM

## 2024-05-30 RX ORDER — IBUPROFEN 600 MG/1
600 TABLET ORAL 3 TIMES DAILY PRN
Qty: 90 TABLET | Refills: 2 | Status: SHIPPED | OUTPATIENT
Start: 2024-05-30

## 2024-05-30 RX ORDER — FLUOXETINE HYDROCHLORIDE 40 MG/1
CAPSULE ORAL
Qty: 90 CAPSULE | Refills: 1 | Status: SHIPPED | OUTPATIENT
Start: 2024-05-30

## 2024-05-30 RX ORDER — TIZANIDINE 4 MG/1
TABLET ORAL
Qty: 360 TABLET | Refills: 1 | Status: SHIPPED | OUTPATIENT
Start: 2024-05-30

## 2024-05-31 ENCOUNTER — OFFICE VISIT (OUTPATIENT)
Dept: INTERNAL MEDICINE CLINIC | Age: 39
End: 2024-05-31
Payer: MEDICARE

## 2024-05-31 VITALS
SYSTOLIC BLOOD PRESSURE: 126 MMHG | OXYGEN SATURATION: 96 % | BODY MASS INDEX: 49.28 KG/M2 | DIASTOLIC BLOOD PRESSURE: 76 MMHG | WEIGHT: 244 LBS | HEART RATE: 81 BPM

## 2024-05-31 DIAGNOSIS — F32.A DEPRESSION, UNSPECIFIED DEPRESSION TYPE: ICD-10-CM

## 2024-05-31 DIAGNOSIS — G25.81 RESTLESS LEG: ICD-10-CM

## 2024-05-31 DIAGNOSIS — F32.4 MAJOR DEPRESSIVE DISORDER IN PARTIAL REMISSION, UNSPECIFIED WHETHER RECURRENT (HCC): ICD-10-CM

## 2024-05-31 DIAGNOSIS — J30.1 SEASONAL ALLERGIC RHINITIS DUE TO POLLEN: ICD-10-CM

## 2024-05-31 DIAGNOSIS — J45.21 EXACERBATION OF INTERMITTENT ASTHMA, UNSPECIFIED ASTHMA SEVERITY: Primary | ICD-10-CM

## 2024-05-31 PROCEDURE — 99214 OFFICE O/P EST MOD 30 MIN: CPT | Performed by: INTERNAL MEDICINE

## 2024-05-31 RX ORDER — PREDNISONE 20 MG/1
20 TABLET ORAL 2 TIMES DAILY
Qty: 10 TABLET | Refills: 0 | Status: SHIPPED | OUTPATIENT
Start: 2024-05-31 | End: 2024-06-05

## 2024-05-31 RX ORDER — MELOXICAM 7.5 MG/1
7.5 TABLET ORAL DAILY
COMMUNITY
Start: 2024-05-13

## 2024-05-31 RX ORDER — GABAPENTIN 300 MG/1
300 CAPSULE ORAL NIGHTLY
Qty: 90 CAPSULE | Refills: 1 | Status: SHIPPED | OUTPATIENT
Start: 2024-05-31 | End: 2024-11-27

## 2024-05-31 RX ORDER — LORATADINE 10 MG/1
10 TABLET ORAL DAILY
Qty: 30 TABLET | Refills: 0 | Status: SHIPPED | OUTPATIENT
Start: 2024-05-31

## 2024-05-31 RX ORDER — FLUTICASONE PROPIONATE 50 MCG
1 SPRAY, SUSPENSION (ML) NASAL DAILY
Qty: 16 G | Refills: 0 | Status: SHIPPED | OUTPATIENT
Start: 2024-05-31

## 2024-05-31 RX ORDER — AZITHROMYCIN 250 MG/1
TABLET, FILM COATED ORAL
Qty: 6 TABLET | Refills: 0 | Status: SHIPPED | OUTPATIENT
Start: 2024-05-31 | End: 2024-06-10

## 2024-05-31 ASSESSMENT — PATIENT HEALTH QUESTIONNAIRE - PHQ9
6. FEELING BAD ABOUT YOURSELF - OR THAT YOU ARE A FAILURE OR HAVE LET YOURSELF OR YOUR FAMILY DOWN: MORE THAN HALF THE DAYS
4. FEELING TIRED OR HAVING LITTLE ENERGY: NEARLY EVERY DAY
10. IF YOU CHECKED OFF ANY PROBLEMS, HOW DIFFICULT HAVE THESE PROBLEMS MADE IT FOR YOU TO DO YOUR WORK, TAKE CARE OF THINGS AT HOME, OR GET ALONG WITH OTHER PEOPLE: SOMEWHAT DIFFICULT
9. THOUGHTS THAT YOU WOULD BE BETTER OFF DEAD, OR OF HURTING YOURSELF: NOT AT ALL
8. MOVING OR SPEAKING SO SLOWLY THAT OTHER PEOPLE COULD HAVE NOTICED. OR THE OPPOSITE, BEING SO FIGETY OR RESTLESS THAT YOU HAVE BEEN MOVING AROUND A LOT MORE THAN USUAL: MORE THAN HALF THE DAYS
SUM OF ALL RESPONSES TO PHQ QUESTIONS 1-9: 21
SUM OF ALL RESPONSES TO PHQ QUESTIONS 1-9: 21
5. POOR APPETITE OR OVEREATING: NEARLY EVERY DAY
SUM OF ALL RESPONSES TO PHQ9 QUESTIONS 1 & 2: 5
1. LITTLE INTEREST OR PLEASURE IN DOING THINGS: NEARLY EVERY DAY
SUM OF ALL RESPONSES TO PHQ QUESTIONS 1-9: 21
SUM OF ALL RESPONSES TO PHQ QUESTIONS 1-9: 21
2. FEELING DOWN, DEPRESSED OR HOPELESS: MORE THAN HALF THE DAYS
3. TROUBLE FALLING OR STAYING ASLEEP: NEARLY EVERY DAY
7. TROUBLE CONCENTRATING ON THINGS, SUCH AS READING THE NEWSPAPER OR WATCHING TELEVISION: NEARLY EVERY DAY

## 2024-05-31 ASSESSMENT — ENCOUNTER SYMPTOMS
RHINORRHEA: 1
VOMITING: 0
TROUBLE SWALLOWING: 0
ABDOMINAL PAIN: 0
VOICE CHANGE: 0
WHEEZING: 0
PHOTOPHOBIA: 0
NAUSEA: 0

## 2024-05-31 ASSESSMENT — COLUMBIA-SUICIDE SEVERITY RATING SCALE - C-SSRS
2. HAVE YOU ACTUALLY HAD ANY THOUGHTS OF KILLING YOURSELF?: NO
1. WITHIN THE PAST MONTH, HAVE YOU WISHED YOU WERE DEAD OR WISHED YOU COULD GO TO SLEEP AND NOT WAKE UP?: NO
6. HAVE YOU EVER DONE ANYTHING, STARTED TO DO ANYTHING, OR PREPARED TO DO ANYTHING TO END YOUR LIFE?: NO

## 2024-05-31 NOTE — PROGRESS NOTES
using voice recognition dragon software.  Every effort was made to ensure accuracy; however, inadvertent  Unintentional computerized transcription errors may be present.

## 2024-06-08 DIAGNOSIS — G25.81 RESTLESS LEG: ICD-10-CM

## 2024-06-10 RX ORDER — GABAPENTIN 300 MG/1
300 CAPSULE ORAL NIGHTLY
Qty: 90 CAPSULE | Refills: 1 | Status: SHIPPED | OUTPATIENT
Start: 2024-06-10 | End: 2024-12-07

## 2024-06-10 NOTE — TELEPHONE ENCOUNTER
Last OV: 5/31/2024  Next OV: 6/14/2024    Next appointment due:  Return in about 3 months (around 8/31/2024    Last fill:5/31/24  Refills:1#90

## 2024-06-11 ASSESSMENT — SLEEP AND FATIGUE QUESTIONNAIRES
HOW LIKELY ARE YOU TO NOD OFF OR FALL ASLEEP WHILE SITTING INACTIVE IN A PUBLIC PLACE: MODERATE CHANCE OF DOZING
HOW LIKELY ARE YOU TO NOD OFF OR FALL ASLEEP IN A CAR, WHILE STOPPED FOR A FEW MINUTES IN TRAFFIC: WOULD NEVER DOZE
HOW LIKELY ARE YOU TO NOD OFF OR FALL ASLEEP WHILE SITTING AND TALKING TO SOMEONE: SLIGHT CHANCE OF DOZING
HOW LIKELY ARE YOU TO NOD OFF OR FALL ASLEEP IN A CAR, WHILE STOPPED FOR A FEW MINUTES IN TRAFFIC: WOULD NEVER DOZE
HOW LIKELY ARE YOU TO NOD OFF OR FALL ASLEEP WHILE SITTING AND TALKING TO SOMEONE: SLIGHT CHANCE OF DOZING
HOW LIKELY ARE YOU TO NOD OFF OR FALL ASLEEP WHILE SITTING QUIETLY AFTER LUNCH WITHOUT ALCOHOL: MODERATE CHANCE OF DOZING
HOW LIKELY ARE YOU TO NOD OFF OR FALL ASLEEP WHILE SITTING AND READING: HIGH CHANCE OF DOZING
HOW LIKELY ARE YOU TO NOD OFF OR FALL ASLEEP WHILE LYING DOWN TO REST IN THE AFTERNOON WHEN CIRCUMSTANCES PERMIT: HIGH CHANCE OF DOZING
HOW LIKELY ARE YOU TO NOD OFF OR FALL ASLEEP WHILE WATCHING TV: HIGH CHANCE OF DOZING
HOW LIKELY ARE YOU TO NOD OFF OR FALL ASLEEP WHILE SITTING INACTIVE IN A PUBLIC PLACE: MODERATE CHANCE OF DOZING
HOW LIKELY ARE YOU TO NOD OFF OR FALL ASLEEP WHILE SITTING QUIETLY AFTER LUNCH WITHOUT ALCOHOL: MODERATE CHANCE OF DOZING
HOW LIKELY ARE YOU TO NOD OFF OR FALL ASLEEP WHEN YOU ARE A PASSENGER IN A CAR FOR AN HOUR WITHOUT A BREAK: WOULD NEVER DOZE
HOW LIKELY ARE YOU TO NOD OFF OR FALL ASLEEP WHILE WATCHING TV: HIGH CHANCE OF DOZING
HOW LIKELY ARE YOU TO NOD OFF OR FALL ASLEEP WHILE SITTING AND READING: HIGH CHANCE OF DOZING
ESS TOTAL SCORE: 14
HOW LIKELY ARE YOU TO NOD OFF OR FALL ASLEEP WHILE LYING DOWN TO REST IN THE AFTERNOON WHEN CIRCUMSTANCES PERMIT: HIGH CHANCE OF DOZING
HOW LIKELY ARE YOU TO NOD OFF OR FALL ASLEEP WHEN YOU ARE A PASSENGER IN A CAR FOR AN HOUR WITHOUT A BREAK: WOULD NEVER DOZE

## 2024-06-12 ENCOUNTER — TELEPHONE (OUTPATIENT)
Dept: PULMONOLOGY | Age: 39
End: 2024-06-12

## 2024-06-12 ENCOUNTER — OFFICE VISIT (OUTPATIENT)
Dept: PULMONOLOGY | Age: 39
End: 2024-06-12
Payer: MEDICAID

## 2024-06-12 VITALS
SYSTOLIC BLOOD PRESSURE: 126 MMHG | WEIGHT: 243 LBS | DIASTOLIC BLOOD PRESSURE: 89 MMHG | HEART RATE: 69 BPM | OXYGEN SATURATION: 97 % | HEIGHT: 59 IN | BODY MASS INDEX: 48.99 KG/M2

## 2024-06-12 DIAGNOSIS — G47.33 OSA (OBSTRUCTIVE SLEEP APNEA): ICD-10-CM

## 2024-06-12 DIAGNOSIS — G47.00 INSOMNIA, UNSPECIFIED TYPE: Primary | ICD-10-CM

## 2024-06-12 PROCEDURE — 99214 OFFICE O/P EST MOD 30 MIN: CPT | Performed by: STUDENT IN AN ORGANIZED HEALTH CARE EDUCATION/TRAINING PROGRAM

## 2024-06-12 RX ORDER — ESZOPICLONE 3 MG/1
3 TABLET, FILM COATED ORAL NIGHTLY
Qty: 30 TABLET | Refills: 1 | Status: SHIPPED | OUTPATIENT
Start: 2024-06-12 | End: 2024-08-11

## 2024-06-12 NOTE — TELEPHONE ENCOUNTER
Called pharmacy and they said that when they run patient's insurance it is coming back that she might have something other than Medicaid.    Spoke with patient and she said all she has is Medicaid and that for some reason the [employer] is still being attached to her.    Spoke with pharmacy and they said they would call the insurance and let them know she no longer has [employer] Medicare and they would try and get approved and let us know if it doesn't go through.    Called patient and let her know.

## 2024-06-12 NOTE — TELEPHONE ENCOUNTER
Voicemail was received from patient with just her name.  Called her back and left message to call office if needed.

## 2024-06-12 NOTE — TELEPHONE ENCOUNTER
Pt called back in stating that prescription that was sent to [facility] (pt unsure of medication) was not going through due to insurance and was told to call if that happened so provider could find another way to get pts medication.     Ph.[phone]

## 2024-06-12 NOTE — PROGRESS NOTES
[facility]  Sleep Medicine  [address] [place] [address], [address]  [place] [address]    Chief Complaint   Patient presents with    Sleep Apnea     And insomnia         Brigette Betancourt comes in today for sleep apnea and insomnia follow-up . She was diagnosed with mild obstructive sleep apnea but not on treatment.  At previous visit CPAP therapy was ordered, however patient is yet to receive machine.  Prescription for eszopiclone was also prescribed but patient only took it for a month.     She falls asleep in  a few hours.  She awakens several times per night and takes naps. She does not use sleep aid/s. She continues to have complaints of disrupted sleep, difficulty falling asleep, difficulty staying asleep, restless sleep, and non refreshed sleep .    DME: [employer]      DATA REVIEWED TODAY:    Diagnostic Review  HST on 6/7/2023 showed a respiratory event index of 5.4/hr with oxygen desaturation down to a yissel of 83%.    [place]           6/11/2024    11:42 AM 12/22/2023    11:54 AM 4/26/2023     3:10 PM   Sleep Medicine   Sitting and reading 3 3 2   Watching TV 3 3 3   Sitting, inactive in a public place (e.g. a theatre or a meeting) 2 1 0   As a passenger in a car for an hour without a break 0 2 3   Lying down to rest in the afternoon when circumstances permit 3 3 3   Sitting and talking to someone 1 1 0   Sitting quietly after a lunch without alcohol 2 2 0   In a car, while stopped for a few minutes in traffic 0 1 0   [place] Sleepiness Score 14 16 11   Neck circumference (Inches)   15         Medications   She has a current medication list which includes the following prescription(s): eszopiclone, gabapentin, meloxicam, fluticasone, loratadine, ibuprofen, tizanidine, fluoxetine, hydroxyzine hcl, fluticasone-salmeterol, albuterol sulfate hfa, atorvastatin, d3-50, nystatin, clindamycin, benzoyl peroxide, levonorgestrel, and adalimumab.    Review of Systems  A complete review of systems was

## 2024-06-12 NOTE — TELEPHONE ENCOUNTER
Pt called in stating that she has contacted [facility] and they will not be able to fill script. Pt is wondering if another script can be sent for a different medication or if anything else can be done.     Ph.[phone]

## 2024-08-19 ENCOUNTER — TELEPHONE (OUTPATIENT)
Dept: INTERNAL MEDICINE CLINIC | Age: 39
End: 2024-08-19

## 2024-08-19 NOTE — TELEPHONE ENCOUNTER
[facility] calling [facility]--all to do with her disability papers---she is going to call them todd today but did want you to know she hasn't been able to get her meds for the last few month---Thanks.

## 2024-08-20 ASSESSMENT — SLEEP AND FATIGUE QUESTIONNAIRES
HOW LIKELY ARE YOU TO NOD OFF OR FALL ASLEEP WHILE SITTING AND READING: HIGH CHANCE OF DOZING
HOW LIKELY ARE YOU TO NOD OFF OR FALL ASLEEP WHILE SITTING AND READING: HIGH CHANCE OF DOZING
HOW LIKELY ARE YOU TO NOD OFF OR FALL ASLEEP WHEN YOU ARE A PASSENGER IN A CAR FOR AN HOUR WITHOUT A BREAK: MODERATE CHANCE OF DOZING
HOW LIKELY ARE YOU TO NOD OFF OR FALL ASLEEP IN A CAR, WHILE STOPPED FOR A FEW MINUTES IN TRAFFIC: WOULD NEVER DOZE
HOW LIKELY ARE YOU TO NOD OFF OR FALL ASLEEP WHILE SITTING INACTIVE IN A PUBLIC PLACE: SLIGHT CHANCE OF DOZING
HOW LIKELY ARE YOU TO NOD OFF OR FALL ASLEEP WHILE SITTING QUIETLY AFTER LUNCH WITHOUT ALCOHOL: WOULD NEVER DOZE
HOW LIKELY ARE YOU TO NOD OFF OR FALL ASLEEP WHILE SITTING AND TALKING TO SOMEONE: SLIGHT CHANCE OF DOZING
HOW LIKELY ARE YOU TO NOD OFF OR FALL ASLEEP WHILE SITTING INACTIVE IN A PUBLIC PLACE: SLIGHT CHANCE OF DOZING
HOW LIKELY ARE YOU TO NOD OFF OR FALL ASLEEP WHILE SITTING AND TALKING TO SOMEONE: SLIGHT CHANCE OF DOZING
HOW LIKELY ARE YOU TO NOD OFF OR FALL ASLEEP WHILE WATCHING TV: HIGH CHANCE OF DOZING
ESS TOTAL SCORE: 13
HOW LIKELY ARE YOU TO NOD OFF OR FALL ASLEEP WHILE WATCHING TV: HIGH CHANCE OF DOZING
HOW LIKELY ARE YOU TO NOD OFF OR FALL ASLEEP WHILE LYING DOWN TO REST IN THE AFTERNOON WHEN CIRCUMSTANCES PERMIT: HIGH CHANCE OF DOZING
HOW LIKELY ARE YOU TO NOD OFF OR FALL ASLEEP WHEN YOU ARE A PASSENGER IN A CAR FOR AN HOUR WITHOUT A BREAK: MODERATE CHANCE OF DOZING
HOW LIKELY ARE YOU TO NOD OFF OR FALL ASLEEP WHILE SITTING QUIETLY AFTER LUNCH WITHOUT ALCOHOL: WOULD NEVER DOZE
HOW LIKELY ARE YOU TO NOD OFF OR FALL ASLEEP IN A CAR, WHILE STOPPED FOR A FEW MINUTES IN TRAFFIC: WOULD NEVER DOZE

## 2024-08-21 ENCOUNTER — OFFICE VISIT (OUTPATIENT)
Dept: PULMONOLOGY | Age: 39
End: 2024-08-21

## 2024-08-21 VITALS
SYSTOLIC BLOOD PRESSURE: 128 MMHG | OXYGEN SATURATION: 94 % | BODY MASS INDEX: 51.2 KG/M2 | HEIGHT: 59 IN | WEIGHT: 254 LBS | DIASTOLIC BLOOD PRESSURE: 88 MMHG

## 2024-08-21 DIAGNOSIS — G47.33 OSA (OBSTRUCTIVE SLEEP APNEA): Primary | ICD-10-CM

## 2024-08-21 DIAGNOSIS — E66.01 OBESITY, MORBID, BMI 50 OR HIGHER (HCC): ICD-10-CM

## 2024-08-21 DIAGNOSIS — G47.00 INSOMNIA, UNSPECIFIED TYPE: ICD-10-CM

## 2024-08-21 NOTE — PROGRESS NOTES
[facility]  Sleep Medicine  [facility], [address]  [place] [address]    Chief Complaint   Patient presents with    Sleep Apnea         Brigette Betancourt comes in today for sleep apnea and insomnia follow-up . She was diagnosed with mild obstructive sleep apnea but struggling with PAP therapy.  At previous visit she was started on a trial of eszopiclone 3 mg nightly.  But because of insurance problems, we had no other option but to prescribe doxepin.  Today she reports all her medications just got approved yesterday. She has used a couple of different masks but still struggling. She is waiting to be allowed to try a nasal mask (full face mask causes problems with her hidradenitis suppurativa).     She falls asleep in    hours.  She awakens several times per night and takes naps. The average total amount of sleep is variable hours per night. She does use sleep aid/s: doxepin 10 mg nightly (but she just started taking it yesterday). She continues to have complaints of disrupted sleep, difficulty staying asleep, and restless sleep.  She is not snoring with the machine on. She does complain of mask fit / discomfort issues.   DME: [employer]  Mask: full face mask  Machine: ResMed Airsense 11 Autoset      DATA REVIEWED TODAY:    Diagnostic Review  HST on 6/7/2023 showed respiratory event index of 5.4/h with oxygen desaturation down to a yissel of 83%.     [place]           8/20/2024    12:57 PM 6/11/2024    11:42 AM 12/22/2023    11:54 AM 4/26/2023     3:10 PM   Sleep Medicine   Sitting and reading 3 3 3 2   Watching TV 3 3 3 3   Sitting, inactive in a public place (e.g. a theatre or a meeting) 1 2 1 0   As a passenger in a car for an hour without a break 2 0 2 3   Lying down to rest in the afternoon when circumstances permit 3 3 3 3   Sitting and talking to someone 1 1 1 0   Sitting quietly after a lunch without alcohol 0 2 2 0   In a car, while stopped for a few minutes in traffic 0 0 1 0

## 2024-08-30 RX ORDER — HYDROXYZINE HYDROCHLORIDE 25 MG/1
25 TABLET, FILM COATED ORAL EVERY 8 HOURS PRN
Qty: 90 TABLET | Refills: 10 | OUTPATIENT
Start: 2024-08-30

## 2024-09-03 ENCOUNTER — OFFICE VISIT (OUTPATIENT)
Dept: INTERNAL MEDICINE CLINIC | Age: 39
End: 2024-09-03
Payer: COMMERCIAL

## 2024-09-03 VITALS
OXYGEN SATURATION: 99 % | DIASTOLIC BLOOD PRESSURE: 78 MMHG | WEIGHT: 246.6 LBS | SYSTOLIC BLOOD PRESSURE: 118 MMHG | HEIGHT: 59 IN | HEART RATE: 80 BPM | BODY MASS INDEX: 49.72 KG/M2

## 2024-09-03 DIAGNOSIS — G25.81 RESTLESS LEG: Primary | ICD-10-CM

## 2024-09-03 DIAGNOSIS — J45.40 MODERATE PERSISTENT ASTHMA WITHOUT COMPLICATION: ICD-10-CM

## 2024-09-03 DIAGNOSIS — G89.29 CHRONIC MIDLINE LOW BACK PAIN WITH BILATERAL SCIATICA: ICD-10-CM

## 2024-09-03 DIAGNOSIS — M54.42 CHRONIC MIDLINE LOW BACK PAIN WITH BILATERAL SCIATICA: ICD-10-CM

## 2024-09-03 DIAGNOSIS — F32.4 MAJOR DEPRESSIVE DISORDER IN PARTIAL REMISSION, UNSPECIFIED WHETHER RECURRENT (HCC): ICD-10-CM

## 2024-09-03 DIAGNOSIS — M54.41 CHRONIC MIDLINE LOW BACK PAIN WITH BILATERAL SCIATICA: ICD-10-CM

## 2024-09-03 DIAGNOSIS — E55.9 VITAMIN D DEFICIENCY: ICD-10-CM

## 2024-09-03 DIAGNOSIS — E78.01 FAMILIAL HYPERCHOLESTEROLEMIA: ICD-10-CM

## 2024-09-03 DIAGNOSIS — M25.50 ARTHRALGIA OF MULTIPLE JOINTS: ICD-10-CM

## 2024-09-03 PROCEDURE — 4004F PT TOBACCO SCREEN RCVD TLK: CPT | Performed by: INTERNAL MEDICINE

## 2024-09-03 PROCEDURE — G8427 DOCREV CUR MEDS BY ELIG CLIN: HCPCS | Performed by: INTERNAL MEDICINE

## 2024-09-03 PROCEDURE — G2211 COMPLEX E/M VISIT ADD ON: HCPCS | Performed by: INTERNAL MEDICINE

## 2024-09-03 PROCEDURE — G8417 CALC BMI ABV UP PARAM F/U: HCPCS | Performed by: INTERNAL MEDICINE

## 2024-09-03 PROCEDURE — 99214 OFFICE O/P EST MOD 30 MIN: CPT | Performed by: INTERNAL MEDICINE

## 2024-09-03 RX ORDER — SECUKINUMAB 150 MG/ML
300 INJECTION SUBCUTANEOUS ONCE
COMMUNITY

## 2024-09-03 RX ORDER — IBUPROFEN 600 MG/1
600 TABLET, FILM COATED ORAL 3 TIMES DAILY PRN
Qty: 90 TABLET | Refills: 2 | Status: SHIPPED | OUTPATIENT
Start: 2024-09-03

## 2024-09-03 RX ORDER — METHOCARBAMOL 750 MG/1
50000 TABLET ORAL WEEKLY
Qty: 12 CAPSULE | Refills: 1 | Status: SHIPPED | OUTPATIENT
Start: 2024-09-03

## 2024-09-03 RX ORDER — GABAPENTIN 300 MG/1
300 CAPSULE ORAL NIGHTLY
Qty: 90 CAPSULE | Refills: 0 | Status: SHIPPED | OUTPATIENT
Start: 2024-09-03 | End: 2025-03-02

## 2024-09-03 ASSESSMENT — ENCOUNTER SYMPTOMS
WHEEZING: 0
NAUSEA: 0
VOMITING: 0
SHORTNESS OF BREATH: 0
BACK PAIN: 1
ABDOMINAL PAIN: 0
VOICE CHANGE: 0
TROUBLE SWALLOWING: 0
COUGH: 1
PHOTOPHOBIA: 0

## 2024-09-03 NOTE — PROGRESS NOTES
Outpatient Medications on File Prior to Visit   Medication Sig Dispense Refill    secukinumab (COSENTYX SENSOREADY PEN) 150 MG/ML SOAJ Inject 2 mLs into the skin once Inject every 28 days      fluticasone (FLONASE) 50 MCG/ACT nasal spray 1 spray by Each Nostril route daily 16 g 0    FLUoxetine (PROZAC) 40 MG capsule TAKE 1 CAPSULE BY MOUTH ONCE DAILY 90 capsule 1    hydrOXYzine HCl (ATARAX) 25 MG tablet TAKE 1 TABLET BY MOUTH EVERY 8 HOURS AS NEEDED FOR ITCHING 90 tablet 1    fluticasone-salmeterol (WIXELA INHUB) 250-50 MCG/ACT AEPB diskus inhaler Inhale 1 puff into the lungs in the morning and 1 puff in the evening. 60 each 3    albuterol sulfate HFA (PROVENTIL;VENTOLIN;PROAIR) 108 (90 Base) MCG/ACT inhaler INHALE 2 PUFFS BY MOUTH EVERY 6 HOURS AS NEEDED FOR WHEEZING 3 each 1    atorvastatin (LIPITOR) 20 MG tablet Take 1 tablet by mouth daily 90 tablet 1    nystatin (MYCOSTATIN) 771381 UNIT/GM powder Apply topically 4 times daily Apply topically 4 times daily. 3 each 0    clindamycin (CLINDAGEL) 1 % gel       BENZOYL PEROXIDE 5 % external wash Apply 142 g topically daily      Levonorgestrel (MIRENA IU) by IntraUTERine route      doxepin (SINEQUAN) 10 MG capsule Take 1 capsule by mouth nightly (Patient not taking: Reported on 9/3/2024) 30 capsule 0    loratadine (CLARITIN) 10 MG tablet Take 1 tablet by mouth daily (Patient not taking: Reported on 9/3/2024) 30 tablet 0     No current facility-administered medications on file prior to visit.         No orders of the defined types were placed in this encounter.          Return in about 3 months (around 12/3/2024).  An After Visit Summary was printed and given to the patient.  Documentation was done using voice recognition dragon software.  Every effort was made to ensure accuracy; however, inadvertent  Unintentional computerized transcription errors may be present.

## 2024-09-15 DIAGNOSIS — G47.00 INSOMNIA, UNSPECIFIED TYPE: ICD-10-CM

## 2024-09-16 RX ORDER — DOXEPIN HYDROCHLORIDE 10 MG/1
10 CAPSULE ORAL NIGHTLY
Qty: 30 CAPSULE | Refills: 1 | Status: SHIPPED | OUTPATIENT
Start: 2024-09-16

## 2024-09-21 DIAGNOSIS — E78.5 HYPERLIPIDEMIA, UNSPECIFIED HYPERLIPIDEMIA TYPE: ICD-10-CM

## 2024-09-23 RX ORDER — ATORVASTATIN CALCIUM 20 MG/1
20 TABLET, FILM COATED ORAL DAILY
Qty: 30 TABLET | Refills: 10 | Status: SHIPPED | OUTPATIENT
Start: 2024-09-23

## 2024-10-11 ENCOUNTER — OFFICE VISIT (OUTPATIENT)
Dept: PSYCHIATRY | Age: 39
End: 2024-10-11
Payer: COMMERCIAL

## 2024-10-11 DIAGNOSIS — F33.2 SEVERE EPISODE OF RECURRENT MAJOR DEPRESSIVE DISORDER, WITHOUT PSYCHOTIC FEATURES (HCC): Primary | ICD-10-CM

## 2024-10-11 DIAGNOSIS — F10.21 ALCOHOL USE DISORDER, SEVERE, IN SUSTAINED REMISSION (HCC): ICD-10-CM

## 2024-10-11 DIAGNOSIS — F43.9 TRAUMA AND STRESSOR-RELATED DISORDER: ICD-10-CM

## 2024-10-11 PROCEDURE — G8484 FLU IMMUNIZE NO ADMIN: HCPCS | Performed by: PSYCHIATRY & NEUROLOGY

## 2024-10-11 PROCEDURE — 99205 OFFICE O/P NEW HI 60 MIN: CPT | Performed by: PSYCHIATRY & NEUROLOGY

## 2024-10-11 PROCEDURE — 4004F PT TOBACCO SCREEN RCVD TLK: CPT | Performed by: PSYCHIATRY & NEUROLOGY

## 2024-10-11 PROCEDURE — G8417 CALC BMI ABV UP PARAM F/U: HCPCS | Performed by: PSYCHIATRY & NEUROLOGY

## 2024-10-11 PROCEDURE — G8428 CUR MEDS NOT DOCUMENT: HCPCS | Performed by: PSYCHIATRY & NEUROLOGY

## 2024-10-11 ASSESSMENT — ANXIETY QUESTIONNAIRES
7. FEELING AFRAID AS IF SOMETHING AWFUL MIGHT HAPPEN: MORE THAN HALF THE DAYS
GAD7 TOTAL SCORE: 16
6. BECOMING EASILY ANNOYED OR IRRITABLE: MORE THAN HALF THE DAYS
IF YOU CHECKED OFF ANY PROBLEMS ON THIS QUESTIONNAIRE, HOW DIFFICULT HAVE THESE PROBLEMS MADE IT FOR YOU TO DO YOUR WORK, TAKE CARE OF THINGS AT HOME, OR GET ALONG WITH OTHER PEOPLE: VERY DIFFICULT
1. FEELING NERVOUS, ANXIOUS, OR ON EDGE: MORE THAN HALF THE DAYS
5. BEING SO RESTLESS THAT IT IS HARD TO SIT STILL: NEARLY EVERY DAY
3. WORRYING TOO MUCH ABOUT DIFFERENT THINGS: MORE THAN HALF THE DAYS
4. TROUBLE RELAXING: NEARLY EVERY DAY
2. NOT BEING ABLE TO STOP OR CONTROL WORRYING: MORE THAN HALF THE DAYS

## 2024-10-11 ASSESSMENT — PATIENT HEALTH QUESTIONNAIRE - PHQ9
9. THOUGHTS THAT YOU WOULD BE BETTER OFF DEAD, OR OF HURTING YOURSELF: MORE THAN HALF THE DAYS
1. LITTLE INTEREST OR PLEASURE IN DOING THINGS: NEARLY EVERY DAY
4. FEELING TIRED OR HAVING LITTLE ENERGY: NEARLY EVERY DAY
SUM OF ALL RESPONSES TO PHQ QUESTIONS 1-9: 23
7. TROUBLE CONCENTRATING ON THINGS, SUCH AS READING THE NEWSPAPER OR WATCHING TELEVISION: NEARLY EVERY DAY
3. TROUBLE FALLING OR STAYING ASLEEP: NEARLY EVERY DAY
SUM OF ALL RESPONSES TO PHQ9 QUESTIONS 1 & 2: 6
8. MOVING OR SPEAKING SO SLOWLY THAT OTHER PEOPLE COULD HAVE NOTICED. OR THE OPPOSITE, BEING SO FIGETY OR RESTLESS THAT YOU HAVE BEEN MOVING AROUND A LOT MORE THAN USUAL: MORE THAN HALF THE DAYS
10. IF YOU CHECKED OFF ANY PROBLEMS, HOW DIFFICULT HAVE THESE PROBLEMS MADE IT FOR YOU TO DO YOUR WORK, TAKE CARE OF THINGS AT HOME, OR GET ALONG WITH OTHER PEOPLE: EXTREMELY DIFFICULT
SUM OF ALL RESPONSES TO PHQ QUESTIONS 1-9: 25
SUM OF ALL RESPONSES TO PHQ QUESTIONS 1-9: 25
2. FEELING DOWN, DEPRESSED OR HOPELESS: NEARLY EVERY DAY
6. FEELING BAD ABOUT YOURSELF - OR THAT YOU ARE A FAILURE OR HAVE LET YOURSELF OR YOUR FAMILY DOWN: NEARLY EVERY DAY
SUM OF ALL RESPONSES TO PHQ QUESTIONS 1-9: 25

## 2024-10-11 ASSESSMENT — COLUMBIA-SUICIDE SEVERITY RATING SCALE - C-SSRS
2. HAVE YOU ACTUALLY HAD ANY THOUGHTS OF KILLING YOURSELF?: NO
6. HAVE YOU EVER DONE ANYTHING, STARTED TO DO ANYTHING, OR PREPARED TO DO ANYTHING TO END YOUR LIFE?: NO
1. WITHIN THE PAST MONTH, HAVE YOU WISHED YOU WERE DEAD OR WISHED YOU COULD GO TO SLEEP AND NOT WAKE UP?: YES
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
## 2024-10-11 NOTE — PATIENT INSTRUCTIONS
[facility] Counseling:   Address: [address], [place] [address]  Phone: [phone]

## 2024-10-15 DIAGNOSIS — J45.40 MODERATE PERSISTENT ASTHMA WITHOUT COMPLICATION: ICD-10-CM

## 2024-10-16 DIAGNOSIS — J45.40 MODERATE PERSISTENT ASTHMA WITHOUT COMPLICATION: ICD-10-CM

## 2024-10-16 RX ORDER — ALBUTEROL SULFATE 90 UG/1
2 INHALANT RESPIRATORY (INHALATION) EVERY 6 HOURS PRN
Qty: 8.5 G | Refills: 5 | Status: SHIPPED | OUTPATIENT
Start: 2024-10-16

## 2024-10-16 RX ORDER — ALBUTEROL SULFATE 90 UG/1
2 INHALANT RESPIRATORY (INHALATION) EVERY 6 HOURS PRN
Qty: 8.5 G | Refills: 10 | OUTPATIENT
Start: 2024-10-16

## 2024-10-17 DIAGNOSIS — G25.81 RESTLESS LEG: ICD-10-CM

## 2024-10-18 RX ORDER — GABAPENTIN 300 MG/1
300 CAPSULE ORAL NIGHTLY
Qty: 30 CAPSULE | Refills: 10 | OUTPATIENT
Start: 2024-10-18

## 2024-10-19 DIAGNOSIS — M54.42 CHRONIC MIDLINE LOW BACK PAIN WITH BILATERAL SCIATICA: ICD-10-CM

## 2024-10-19 DIAGNOSIS — M54.41 CHRONIC MIDLINE LOW BACK PAIN WITH BILATERAL SCIATICA: ICD-10-CM

## 2024-10-19 DIAGNOSIS — G89.29 CHRONIC MIDLINE LOW BACK PAIN WITH BILATERAL SCIATICA: ICD-10-CM

## 2024-10-21 RX ORDER — IBUPROFEN 600 MG/1
600 TABLET, FILM COATED ORAL 3 TIMES DAILY PRN
Qty: 90 TABLET | Refills: 10 | Status: SHIPPED | OUTPATIENT
Start: 2024-10-21

## 2024-10-24 ENCOUNTER — OFFICE VISIT (OUTPATIENT)
Dept: PULMONOLOGY | Age: 39
End: 2024-10-24
Payer: COMMERCIAL

## 2024-10-24 VITALS
WEIGHT: 247.2 LBS | BODY MASS INDEX: 49.84 KG/M2 | HEIGHT: 59 IN | HEART RATE: 73 BPM | OXYGEN SATURATION: 96 % | SYSTOLIC BLOOD PRESSURE: 120 MMHG | DIASTOLIC BLOOD PRESSURE: 74 MMHG

## 2024-10-24 DIAGNOSIS — E66.01 MORBID OBESITY WITH BMI OF 45.0-49.9, ADULT: ICD-10-CM

## 2024-10-24 DIAGNOSIS — G47.00 INSOMNIA, UNSPECIFIED TYPE: ICD-10-CM

## 2024-10-24 DIAGNOSIS — G47.33 OSA (OBSTRUCTIVE SLEEP APNEA): Primary | ICD-10-CM

## 2024-10-24 PROCEDURE — 4004F PT TOBACCO SCREEN RCVD TLK: CPT | Performed by: STUDENT IN AN ORGANIZED HEALTH CARE EDUCATION/TRAINING PROGRAM

## 2024-10-24 PROCEDURE — G8417 CALC BMI ABV UP PARAM F/U: HCPCS | Performed by: STUDENT IN AN ORGANIZED HEALTH CARE EDUCATION/TRAINING PROGRAM

## 2024-10-24 PROCEDURE — G8427 DOCREV CUR MEDS BY ELIG CLIN: HCPCS | Performed by: STUDENT IN AN ORGANIZED HEALTH CARE EDUCATION/TRAINING PROGRAM

## 2024-10-24 PROCEDURE — 99214 OFFICE O/P EST MOD 30 MIN: CPT | Performed by: STUDENT IN AN ORGANIZED HEALTH CARE EDUCATION/TRAINING PROGRAM

## 2024-10-24 PROCEDURE — G8484 FLU IMMUNIZE NO ADMIN: HCPCS | Performed by: STUDENT IN AN ORGANIZED HEALTH CARE EDUCATION/TRAINING PROGRAM

## 2024-10-24 RX ORDER — DOXEPIN HYDROCHLORIDE 10 MG/ML
10 SOLUTION ORAL NIGHTLY
Qty: 120 ML | Refills: 0 | Status: SHIPPED | OUTPATIENT
Start: 2024-10-24

## 2024-10-24 ASSESSMENT — SLEEP AND FATIGUE QUESTIONNAIRES
HOW LIKELY ARE YOU TO NOD OFF OR FALL ASLEEP WHILE LYING DOWN TO REST IN THE AFTERNOON WHEN CIRCUMSTANCES PERMIT: HIGH CHANCE OF DOZING
HOW LIKELY ARE YOU TO NOD OFF OR FALL ASLEEP WHEN YOU ARE A PASSENGER IN A CAR FOR AN HOUR WITHOUT A BREAK: MODERATE CHANCE OF DOZING
HOW LIKELY ARE YOU TO NOD OFF OR FALL ASLEEP WHILE SITTING AND READING: HIGH CHANCE OF DOZING
HOW LIKELY ARE YOU TO NOD OFF OR FALL ASLEEP WHILE LYING DOWN TO REST IN THE AFTERNOON WHEN CIRCUMSTANCES PERMIT: HIGH CHANCE OF DOZING
HOW LIKELY ARE YOU TO NOD OFF OR FALL ASLEEP WHILE WATCHING TV: HIGH CHANCE OF DOZING
HOW LIKELY ARE YOU TO NOD OFF OR FALL ASLEEP WHILE SITTING INACTIVE IN A PUBLIC PLACE: SLIGHT CHANCE OF DOZING
HOW LIKELY ARE YOU TO NOD OFF OR FALL ASLEEP WHILE WATCHING TV: HIGH CHANCE OF DOZING
HOW LIKELY ARE YOU TO NOD OFF OR FALL ASLEEP IN A CAR, WHILE STOPPED FOR A FEW MINUTES IN TRAFFIC: WOULD NEVER DOZE
HOW LIKELY ARE YOU TO NOD OFF OR FALL ASLEEP WHILE SITTING QUIETLY AFTER LUNCH WITHOUT ALCOHOL: SLIGHT CHANCE OF DOZING
HOW LIKELY ARE YOU TO NOD OFF OR FALL ASLEEP IN A CAR, WHILE STOPPED FOR A FEW MINUTES IN TRAFFIC: WOULD NEVER DOZE
HOW LIKELY ARE YOU TO NOD OFF OR FALL ASLEEP WHILE SITTING AND TALKING TO SOMEONE: WOULD NEVER DOZE
HOW LIKELY ARE YOU TO NOD OFF OR FALL ASLEEP WHILE SITTING AND TALKING TO SOMEONE: WOULD NEVER DOZE
ESS TOTAL SCORE: 13
HOW LIKELY ARE YOU TO NOD OFF OR FALL ASLEEP WHILE SITTING INACTIVE IN A PUBLIC PLACE: SLIGHT CHANCE OF DOZING
HOW LIKELY ARE YOU TO NOD OFF OR FALL ASLEEP WHEN YOU ARE A PASSENGER IN A CAR FOR AN HOUR WITHOUT A BREAK: MODERATE CHANCE OF DOZING
HOW LIKELY ARE YOU TO NOD OFF OR FALL ASLEEP WHILE SITTING QUIETLY AFTER LUNCH WITHOUT ALCOHOL: SLIGHT CHANCE OF DOZING
HOW LIKELY ARE YOU TO NOD OFF OR FALL ASLEEP WHILE SITTING AND READING: HIGH CHANCE OF DOZING

## 2024-10-24 NOTE — PROGRESS NOTES
[facility]  Sleep Medicine  [address] [place] [address], [address]  [place] [address]    Chief Complaint   Patient presents with    Sleep Apnea         Brigette Betancourt comes in today for sleep apnea follow-up . She was diagnosed with mild obstructive sleep apnea and is being treated with PAP therapy.   She has been on PAP therapy for a few months.  She states she wears the PAP device some nights.     She falls asleep in  a couple of hours.  She awakens variable times per night. The average total amount of sleep is variable hours per night and takes naps. She does use sleep aid/s: doxepin 10 mg nightly. Symptoms of sleep apnea have not improved since using PAP therapy.  She continues to have complaints of disrupted sleep, difficulty falling asleep, and difficulty staying asleep (though improved a bit with medication).  She is not snoring with the machine on.  She does complain of mask fit / discomfort issues. The full face mask is uncomfortable.  She is working with [facility] to see if she can obtain a nasal cushion mask.  DME: [employer]  Mask: full face mask  Machine: ResMed Airsense 11 Autoset        DATA REVIEWED TODAY:     Diagnostic Review  HST on 6/7/2023 showed respiratory event index of 5.4/h with oxygen desaturation down to a yissel of 83%.     [place]           10/24/2024     8:59 AM 8/20/2024    12:57 PM 6/11/2024    11:42 AM 12/22/2023    11:54 AM 4/26/2023     3:10 PM   Sleep Medicine   Sitting and reading 3 3 3 3 2   Watching TV 3 3 3 3 3   Sitting, inactive in a public place (e.g. a theatre or a meeting) 1 1 2 1 0   As a passenger in a car for an hour without a break 2 2 0 2 3   Lying down to rest in the afternoon when circumstances permit 3 3 3 3 3   Sitting and talking to someone 0 1 1 1 0   Sitting quietly after a lunch without alcohol 1 0 2 2 0   In a car, while stopped for a few minutes in traffic 0 0 0 1 0   [place] Sleepiness Score 13 13 14 16 11   Neck (Inches)     15       PAP

## 2024-10-24 NOTE — PATIENT INSTRUCTIONS
Drink coffee, walk around or have a brief nap in your car if you are sleepy.  Have a 10-15 minute break after every 2 hours of driving.    8. Drive with a .  Share the driving.  Relax in the back seat until it is your time to share the driving again.       Resmed magnetic mask recall updated contraindications and warning:    Updated Contraindications1  Masks with magnetic components are contraindicated for use by patients where they, or anyone in close physical contact while using the mask, have the following:  Active medical implants that interact with magnets (i.e., pacemakers, implantable cardioverter defibrillators (ICD), neurostimulators, cerebrospinal fluid (CSF) shunts, insulin/infusion pumps)  Metallic implants/objects containing ferromagnetic material (i.e., aneurysm clips/flow disruption devices, embolic coils, stents, valves, electrodes, implants to restore hearing or balance with implanted magnets, ocular implants, metallic splinters in the eye).    Updated Warning2  Keep the mask magnets at a safe distance of at least 6 inches (150 mm) away from implants or medical devices that may be adversely affected by magnetic interference. This warning applies to you or anyone in close physical contact with your mask. The magnets are in the frame and lower headgear clips, with a magnetic field strength of up to 400mT. When worn, they connect to secure the mask but may inadvertently detach while asleep.  Implants/medical devices, including those listed within contraindications, may be adversely affected if they change function under external magnetic fields or contain ferromagnetic materials that attract/repel to magnetic fields (some metallic implants, e.g., contact lenses with metal, dental implants, metallic cranial plates, screws, karon hole covers, and bone substitute devices). Consult your physician and  of your implant / other medical device for information on the potential adverse

## 2024-12-23 ENCOUNTER — OFFICE VISIT (OUTPATIENT)
Dept: INTERNAL MEDICINE CLINIC | Age: 39
End: 2024-12-23
Payer: MEDICAID

## 2024-12-23 VITALS
BODY MASS INDEX: 50.08 KG/M2 | OXYGEN SATURATION: 95 % | HEART RATE: 78 BPM | WEIGHT: 248.4 LBS | HEIGHT: 59 IN | SYSTOLIC BLOOD PRESSURE: 104 MMHG | DIASTOLIC BLOOD PRESSURE: 76 MMHG

## 2024-12-23 DIAGNOSIS — M54.42 CHRONIC MIDLINE LOW BACK PAIN WITH BILATERAL SCIATICA: ICD-10-CM

## 2024-12-23 DIAGNOSIS — M54.41 CHRONIC MIDLINE LOW BACK PAIN WITH BILATERAL SCIATICA: ICD-10-CM

## 2024-12-23 DIAGNOSIS — G25.81 RESTLESS LEG: ICD-10-CM

## 2024-12-23 DIAGNOSIS — F32.A DEPRESSION, UNSPECIFIED DEPRESSION TYPE: ICD-10-CM

## 2024-12-23 DIAGNOSIS — E78.01 FAMILIAL HYPERCHOLESTEROLEMIA: ICD-10-CM

## 2024-12-23 DIAGNOSIS — J45.40 MODERATE PERSISTENT ASTHMA WITHOUT COMPLICATION: ICD-10-CM

## 2024-12-23 DIAGNOSIS — Z00.00 ENCOUNTER FOR WELL ADULT EXAM WITHOUT ABNORMAL FINDINGS: Primary | ICD-10-CM

## 2024-12-23 DIAGNOSIS — L29.9 ITCHING: ICD-10-CM

## 2024-12-23 DIAGNOSIS — G89.29 CHRONIC MIDLINE LOW BACK PAIN WITH BILATERAL SCIATICA: ICD-10-CM

## 2024-12-23 PROCEDURE — 99213 OFFICE O/P EST LOW 20 MIN: CPT | Performed by: INTERNAL MEDICINE

## 2024-12-23 PROCEDURE — 99395 PREV VISIT EST AGE 18-39: CPT | Performed by: INTERNAL MEDICINE

## 2024-12-23 RX ORDER — GABAPENTIN 300 MG/1
300 CAPSULE ORAL NIGHTLY
Qty: 90 CAPSULE | Refills: 0 | Status: SHIPPED | OUTPATIENT
Start: 2024-12-23 | End: 2025-06-21

## 2024-12-23 RX ORDER — FLUOXETINE 40 MG/1
CAPSULE ORAL
Qty: 90 CAPSULE | Refills: 1 | Status: SHIPPED | OUTPATIENT
Start: 2024-12-23 | End: 2024-12-27

## 2024-12-23 RX ORDER — DOXYCYCLINE 100 MG/1
100 CAPSULE ORAL 2 TIMES DAILY
COMMUNITY

## 2024-12-23 RX ORDER — ALBUTEROL SULFATE 90 UG/1
2 INHALANT RESPIRATORY (INHALATION) EVERY 6 HOURS PRN
Qty: 8.5 G | Refills: 5 | Status: SHIPPED | OUTPATIENT
Start: 2024-12-23

## 2024-12-23 RX ORDER — FLUTICASONE PROPIONATE AND SALMETEROL 250; 50 UG/1; UG/1
1 POWDER RESPIRATORY (INHALATION) EVERY 12 HOURS
Qty: 60 EACH | Refills: 3 | Status: SHIPPED | OUTPATIENT
Start: 2024-12-23

## 2024-12-23 RX ORDER — HYDROXYZINE HYDROCHLORIDE 25 MG/1
25 TABLET, FILM COATED ORAL EVERY 8 HOURS PRN
Qty: 90 TABLET | Refills: 1 | Status: SHIPPED | OUTPATIENT
Start: 2024-12-23

## 2024-12-23 RX ORDER — SPIRONOLACTONE 50 MG/1
50 TABLET, FILM COATED ORAL 2 TIMES DAILY
COMMUNITY

## 2024-12-23 SDOH — ECONOMIC STABILITY: FOOD INSECURITY: WITHIN THE PAST 12 MONTHS, THE FOOD YOU BOUGHT JUST DIDN'T LAST AND YOU DIDN'T HAVE MONEY TO GET MORE.: NEVER TRUE

## 2024-12-23 SDOH — ECONOMIC STABILITY: FOOD INSECURITY: WITHIN THE PAST 12 MONTHS, YOU WORRIED THAT YOUR FOOD WOULD RUN OUT BEFORE YOU GOT MONEY TO BUY MORE.: NEVER TRUE

## 2024-12-23 SDOH — ECONOMIC STABILITY: INCOME INSECURITY: HOW HARD IS IT FOR YOU TO PAY FOR THE VERY BASICS LIKE FOOD, HOUSING, MEDICAL CARE, AND HEATING?: NOT HARD AT ALL

## 2024-12-23 ASSESSMENT — ENCOUNTER SYMPTOMS
PHOTOPHOBIA: 0
WHEEZING: 0
VOMITING: 0
COUGH: 1
SHORTNESS OF BREATH: 0
NAUSEA: 0

## 2024-12-23 NOTE — PROGRESS NOTES
Well Adult Note  Name: Brigette Betancourt Today’s Date: 2024   MRN: [id number] Sex: Female   Age: 39 y.o. Ethnicity: Non- / Non    : 1985 Race: White (non-)      Brigette Betancourt is here for a well adult exam.  Chief Complaint   Patient presents with    Annual Exam     Get medications           Assessment & Plan   Familial hypercholesterolemia  -     Lipid, Fasting; Future  -     Hepatic Function Panel; Future  Restless leg  Stable continue-     gabapentin (NEURONTIN) 300 MG capsule; Take 1 capsule by mouth nightly for 180 days., Disp-90 capsule, R-0Normal  Chronic midline low back pain with bilateral sciatica  Patient continue to follow-up with Dr. Gore.  Depression, unspecified depression type  Currently taking total 60 mg of Prozac and also following up with psychiatrist.  Denies suicidal homicidal ideation or manic symptoms  -     FLUoxetine (PROZAC) 40 MG capsule; TAKE 1 CAPSULE BY MOUTH ONCE DAILY, Disp-90 capsule, R-1Total dose 60 mg/dayNormal  -     CBC with Auto Differential; Future  Moderate persistent asthma without complication  Moderate to severe asthma.  I will send prescription again for long-acting beta agonist and inhaled steroid  -     albuterol sulfate HFA (PROVENTIL;VENTOLIN;PROAIR) 108 (90 Base) MCG/ACT inhaler; Inhale 2 puffs into the lungs every 6 hours as needed for Wheezing, Disp-8.5 g, R-5Normal  -     fluticasone-salmeterol (WIXELA INHUB) 250-50 MCG/ACT AEPB diskus inhaler; Inhale 1 puff into the lungs in the morning and 1 puff in the evening., Disp-60 each, R-3Can dispense similar or generic medicationNormal  -     CBC with Auto Differential; Future  Itching  As needed-     hydrOXYzine HCl (ATARAX) 25 MG tablet; Take 1 tablet by mouth every 8 hours as needed for Itching, Disp-90 tablet, R-1Normal  Advised patient since she is already taking doxepin she may not need hydroxyzine anymore.  I advised her to stay off hydroxyzine for few days and see how she

## 2024-12-27 ENCOUNTER — OFFICE VISIT (OUTPATIENT)
Dept: PSYCHIATRY | Age: 39
End: 2024-12-27
Payer: MEDICAID

## 2024-12-27 DIAGNOSIS — F33.2 SEVERE EPISODE OF RECURRENT MAJOR DEPRESSIVE DISORDER, WITHOUT PSYCHOTIC FEATURES (HCC): Primary | ICD-10-CM

## 2024-12-27 DIAGNOSIS — F43.9 TRAUMA AND STRESSOR-RELATED DISORDER: ICD-10-CM

## 2024-12-27 DIAGNOSIS — F10.21 ALCOHOL USE DISORDER, SEVERE, IN SUSTAINED REMISSION (HCC): ICD-10-CM

## 2024-12-27 PROCEDURE — 99214 OFFICE O/P EST MOD 30 MIN: CPT | Performed by: PSYCHIATRY & NEUROLOGY

## 2024-12-27 RX ORDER — FLUOXETINE 40 MG/1
80 CAPSULE ORAL DAILY
Qty: 180 CAPSULE | Refills: 1 | Status: SHIPPED | OUTPATIENT
Start: 2024-12-27

## 2024-12-27 NOTE — PROGRESS NOTES
PSYCHIATRY PROGRESS NOTE    Brigette Betancourt : 24  PCP: SANCHO Ramos MD    Chief Complaint   Patient presents with    Depression    Anxiety     S:   Pt has noticed some improvement with the higher dose of fluoxetine. She has noticed some more interest in doing things in the home like reading.   She continues to avoid going out and spends her time to herself. Her HS makes it hard for her to socialize and impairs her ability to return to work. She is dealing with a significant skin infection in her left leg and torso that is making mobility difficult.  Pt can struggle with a sense of hopelessness but finds her daughter as a source of support for her to continue to live her life.   She has not scheduled with a therapist yet but intends to do so.      Sees Dermatology at     ROS: +pain in left upper leg d/t infection    Current Psychiatric Medications:  Fluoxetine 60mg daily  Doxepin 10mg qhs (oral liquid)  Hydroxyzine 25mg TID prn itching / anxiety    O:  There were no vitals filed for this visit.     Mental Status Exam:   Appearance   casually dressed, appropriately groomed  Motor: No abnormal movements, tics or mannerisms.  Speech    normal rate, rhythm, and vol  Mood/Affect    Depressed / tearful, fair motility and range  Thought Content no delusions, no suicidal ideation  Thought Process linear  Associations    logical connections  Attention/Concentration    intact  Memory    recent and remote memory intact  Insight/Judgement    Good / Intact    Labs:   No new labs      A:  A little improvement with the fluoxetine dose change. Really needs psychotherapy to address past trauma and develop more coping strategies and avoid further isolation. Her HS makes coping with her life extremely challenging given the limitations it imposes on her and the constant struggle it is to manage.     Major depressive disorder, recurrent, severe  Trauma and stress related disorder r/o PTSD  Alcohol use disorder,

## 2024-12-27 NOTE — PATIENT INSTRUCTIONS
[facility] Counseling:   Address: [address], [place] [address]  Phone: [phone]

## 2024-12-30 ENCOUNTER — TELEPHONE (OUTPATIENT)
Dept: PSYCHIATRY | Age: 39
End: 2024-12-30

## 2024-12-30 NOTE — TELEPHONE ENCOUNTER
Received a fax from [employer] wanting to clarify the dose of pt Fluoxetine. States that directions and comments don't match. Please advise

## 2024-12-31 RX ORDER — FLUOXETINE 40 MG/1
80 CAPSULE ORAL DAILY
Qty: 180 CAPSULE | Refills: 1 | Status: SHIPPED | OUTPATIENT
Start: 2024-12-31

## 2024-12-31 NOTE — TELEPHONE ENCOUNTER
Called and left message at [facility] advising that a new rx was e scribed with the correct dosage

## 2025-01-13 ENCOUNTER — TELEPHONE (OUTPATIENT)
Dept: INTERNAL MEDICINE CLINIC | Age: 40
End: 2025-01-13

## 2025-01-13 DIAGNOSIS — M54.42 CHRONIC MIDLINE LOW BACK PAIN WITH BILATERAL SCIATICA: ICD-10-CM

## 2025-01-13 DIAGNOSIS — G89.29 CHRONIC MIDLINE LOW BACK PAIN WITH BILATERAL SCIATICA: ICD-10-CM

## 2025-01-13 DIAGNOSIS — M54.41 CHRONIC MIDLINE LOW BACK PAIN WITH BILATERAL SCIATICA: ICD-10-CM

## 2025-01-13 NOTE — TELEPHONE ENCOUNTER
Pt calling, [facility] pharmacy did not receive refill for FLUoxetine (PROZAC) 40 MG capsule sent on 12/31/24. Please advise and resend to [facility] on [address]

## 2025-01-13 NOTE — TELEPHONE ENCOUNTER
Pt calling requesting refill of tiZANidine (ZANAFLEX) 4 MG tablet     Last written 9/3/24  Last OV 12/23/24  Next OV 3/25/25    Please send to [facility] on [address]

## 2025-01-14 RX ORDER — FLUOXETINE 40 MG/1
80 CAPSULE ORAL DAILY
Qty: 180 CAPSULE | Refills: 1 | Status: SHIPPED | OUTPATIENT
Start: 2025-01-14

## 2025-01-21 ENCOUNTER — TELEPHONE (OUTPATIENT)
Dept: INTERNAL MEDICINE CLINIC | Age: 40
End: 2025-01-21

## 2025-01-21 DIAGNOSIS — M54.41 CHRONIC MIDLINE LOW BACK PAIN WITH BILATERAL SCIATICA: ICD-10-CM

## 2025-01-21 DIAGNOSIS — M54.42 CHRONIC MIDLINE LOW BACK PAIN WITH BILATERAL SCIATICA: ICD-10-CM

## 2025-01-21 DIAGNOSIS — G89.29 CHRONIC MIDLINE LOW BACK PAIN WITH BILATERAL SCIATICA: ICD-10-CM

## 2025-01-21 NOTE — TELEPHONE ENCOUNTER
[facility] calling about the Tizanidine sent 1/13---this medication is on back order can not get at this time--asking for an alternative medicine to give pt---please call them at [phone].  Thanks.

## 2025-01-21 NOTE — TELEPHONE ENCOUNTER
Spoke with patient and confirmed that patient wants to stay on the same mediation and request that script be sent to local pharmacy. New script sent

## 2025-02-11 DIAGNOSIS — L29.9 ITCHING: ICD-10-CM

## 2025-02-11 DIAGNOSIS — G25.81 RESTLESS LEG: ICD-10-CM

## 2025-02-12 RX ORDER — GABAPENTIN 300 MG/1
300 CAPSULE ORAL NIGHTLY
Qty: 90 CAPSULE | Refills: 0 | Status: SHIPPED | OUTPATIENT
Start: 2025-02-12 | End: 2025-05-13

## 2025-02-12 RX ORDER — HYDROXYZINE HYDROCHLORIDE 25 MG/1
25 TABLET, FILM COATED ORAL EVERY 8 HOURS PRN
Qty: 90 TABLET | Refills: 0 | Status: SHIPPED | OUTPATIENT
Start: 2025-02-12

## 2025-02-27 ENCOUNTER — OFFICE VISIT (OUTPATIENT)
Dept: PSYCHIATRY | Age: 40
End: 2025-02-27
Payer: MEDICARE

## 2025-02-27 VITALS
HEIGHT: 59 IN | SYSTOLIC BLOOD PRESSURE: 104 MMHG | DIASTOLIC BLOOD PRESSURE: 80 MMHG | HEART RATE: 79 BPM | BODY MASS INDEX: 50.28 KG/M2 | WEIGHT: 249.4 LBS

## 2025-02-27 DIAGNOSIS — F10.21 ALCOHOL USE DISORDER, SEVERE, IN SUSTAINED REMISSION (HCC): ICD-10-CM

## 2025-02-27 DIAGNOSIS — F43.9 TRAUMA AND STRESSOR-RELATED DISORDER: ICD-10-CM

## 2025-02-27 DIAGNOSIS — F33.1 MODERATE EPISODE OF RECURRENT MAJOR DEPRESSIVE DISORDER (HCC): Primary | ICD-10-CM

## 2025-02-27 PROCEDURE — G8417 CALC BMI ABV UP PARAM F/U: HCPCS | Performed by: PSYCHIATRY & NEUROLOGY

## 2025-02-27 PROCEDURE — 99214 OFFICE O/P EST MOD 30 MIN: CPT | Performed by: PSYCHIATRY & NEUROLOGY

## 2025-02-27 PROCEDURE — G8427 DOCREV CUR MEDS BY ELIG CLIN: HCPCS | Performed by: PSYCHIATRY & NEUROLOGY

## 2025-02-27 PROCEDURE — 4004F PT TOBACCO SCREEN RCVD TLK: CPT | Performed by: PSYCHIATRY & NEUROLOGY

## 2025-02-27 RX ORDER — BUSPIRONE HYDROCHLORIDE 7.5 MG/1
7.5 TABLET ORAL 2 TIMES DAILY
Qty: 180 TABLET | Refills: 0 | Status: SHIPPED | OUTPATIENT
Start: 2025-02-27

## 2025-02-27 ASSESSMENT — PATIENT HEALTH QUESTIONNAIRE - PHQ9
SUM OF ALL RESPONSES TO PHQ QUESTIONS 1-9: 15
1. LITTLE INTEREST OR PLEASURE IN DOING THINGS: NEARLY EVERY DAY
7. TROUBLE CONCENTRATING ON THINGS, SUCH AS READING THE NEWSPAPER OR WATCHING TELEVISION: SEVERAL DAYS
SUM OF ALL RESPONSES TO PHQ QUESTIONS 1-9: 15
9. THOUGHTS THAT YOU WOULD BE BETTER OFF DEAD, OR OF HURTING YOURSELF: NOT AT ALL
2. FEELING DOWN, DEPRESSED OR HOPELESS: MORE THAN HALF THE DAYS
8. MOVING OR SPEAKING SO SLOWLY THAT OTHER PEOPLE COULD HAVE NOTICED. OR THE OPPOSITE, BEING SO FIGETY OR RESTLESS THAT YOU HAVE BEEN MOVING AROUND A LOT MORE THAN USUAL: NOT AT ALL
3. TROUBLE FALLING OR STAYING ASLEEP: NEARLY EVERY DAY
6. FEELING BAD ABOUT YOURSELF - OR THAT YOU ARE A FAILURE OR HAVE LET YOURSELF OR YOUR FAMILY DOWN: MORE THAN HALF THE DAYS
5. POOR APPETITE OR OVEREATING: MORE THAN HALF THE DAYS
4. FEELING TIRED OR HAVING LITTLE ENERGY: MORE THAN HALF THE DAYS
10. IF YOU CHECKED OFF ANY PROBLEMS, HOW DIFFICULT HAVE THESE PROBLEMS MADE IT FOR YOU TO DO YOUR WORK, TAKE CARE OF THINGS AT HOME, OR GET ALONG WITH OTHER PEOPLE: SOMEWHAT DIFFICULT
SUM OF ALL RESPONSES TO PHQ9 QUESTIONS 1 & 2: 5

## 2025-02-27 ASSESSMENT — ANXIETY QUESTIONNAIRES
IF YOU CHECKED OFF ANY PROBLEMS ON THIS QUESTIONNAIRE, HOW DIFFICULT HAVE THESE PROBLEMS MADE IT FOR YOU TO DO YOUR WORK, TAKE CARE OF THINGS AT HOME, OR GET ALONG WITH OTHER PEOPLE: SOMEWHAT DIFFICULT
1. FEELING NERVOUS, ANXIOUS, OR ON EDGE: SEVERAL DAYS
7. FEELING AFRAID AS IF SOMETHING AWFUL MIGHT HAPPEN: SEVERAL DAYS
2. NOT BEING ABLE TO STOP OR CONTROL WORRYING: NEARLY EVERY DAY
6. BECOMING EASILY ANNOYED OR IRRITABLE: SEVERAL DAYS
GAD7 TOTAL SCORE: 13
5. BEING SO RESTLESS THAT IT IS HARD TO SIT STILL: MORE THAN HALF THE DAYS
4. TROUBLE RELAXING: NEARLY EVERY DAY
3. WORRYING TOO MUCH ABOUT DIFFERENT THINGS: MORE THAN HALF THE DAYS

## 2025-02-27 NOTE — PROGRESS NOTES
PSYCHIATRY PROGRESS NOTE    Brigette Betancourt : 25  PCP: SANCHO Ramos MD    Chief Complaint   Patient presents with    Follow-up     S:   Feels the fluoxetine is helpful. Feels when she gets more anxious or depressed thought she can get out of them easier. Is getting anxiety about her future and feels passing her 40th birthday has triggered more thoughts about opportunities lost due to her health and difficulties and uncertainties about her future.   Doesn't use MJ often as it is expensive, difficult to afford. Finds that it is a means to cope with her HS pain when she has it available.   Doesn't always use the doxepin at night, wont use it on night when she is able to sleep ok on her own.   Found that some recent days when the weather was good her mood was significantly better.   Spending more time reading now to help occupy her time which she enjoys.     ROS: +wounds d/t HS, chronic. +back pain    Current Psychiatric Medications:  Fluoxetine 80mg daily  Doxepin 10mg qhs (oral liquid)  Hydroxyzine 25mg TID prn itching / anxiety    O:  Vitals:    25 0957   BP: 104/80   Pulse: 79      Wt Readings from Last 3 Encounters:   25 113.1 kg (249 lb 6.4 oz)   24 112.7 kg (248 lb 6.4 oz)   10/24/24 112.1 kg (247 lb 3.2 oz)         2025    10:02 AM 10/11/2024    12:45 PM 2024    11:46 AM 2024    11:24 AM 2024    10:56 AM 2023     1:29 PM 2022    10:05 AM   PHQ Scores   PHQ2 Score 5 6 5 5 3 6 5   PHQ9 Score 15 25 17 21 4 22 15         2025    10:00 AM 10/11/2024    12:00 PM   MARCY 7 SCORE   MARCY-7 Total Score 13 16     Interpretation of MARCY-7 score: 5-9 = mild anxiety, 10-14 = moderate anxiety, 15+ = severe anxiety. Recommend referral to behavioral health for scores 10 or greater.     Mental Status Exam:   Appearance   casually dressed, appropriately groomed  Motor: No abnormal movements, tics or mannerisms.  Speech    normal rate, rhythm, and vol  Mood/Affect

## 2025-02-27 NOTE — PATIENT INSTRUCTIONS
[phone]    Can use 1/2 tab of buspar for 1 week before going to 1 tab twice daily if you are having side effects.

## 2025-03-14 DIAGNOSIS — L29.9 ITCHING: ICD-10-CM

## 2025-03-17 RX ORDER — HYDROXYZINE HYDROCHLORIDE 25 MG/1
25 TABLET, FILM COATED ORAL EVERY 8 HOURS PRN
Qty: 90 TABLET | Refills: 10 | Status: SHIPPED | OUTPATIENT
Start: 2025-03-17

## 2025-04-10 ENCOUNTER — OFFICE VISIT (OUTPATIENT)
Dept: PSYCHIATRY | Age: 40
End: 2025-04-10
Payer: MEDICARE

## 2025-04-10 DIAGNOSIS — F33.1 MODERATE EPISODE OF RECURRENT MAJOR DEPRESSIVE DISORDER (HCC): Primary | ICD-10-CM

## 2025-04-10 DIAGNOSIS — F43.9 TRAUMA AND STRESSOR-RELATED DISORDER: ICD-10-CM

## 2025-04-10 DIAGNOSIS — F10.21 ALCOHOL USE DISORDER, SEVERE, IN SUSTAINED REMISSION: ICD-10-CM

## 2025-04-10 PROCEDURE — 4004F PT TOBACCO SCREEN RCVD TLK: CPT | Performed by: PSYCHIATRY & NEUROLOGY

## 2025-04-10 PROCEDURE — 99214 OFFICE O/P EST MOD 30 MIN: CPT | Performed by: PSYCHIATRY & NEUROLOGY

## 2025-04-10 PROCEDURE — G8428 CUR MEDS NOT DOCUMENT: HCPCS | Performed by: PSYCHIATRY & NEUROLOGY

## 2025-04-10 PROCEDURE — G8417 CALC BMI ABV UP PARAM F/U: HCPCS | Performed by: PSYCHIATRY & NEUROLOGY

## 2025-04-10 RX ORDER — BUSPIRONE HYDROCHLORIDE 15 MG/1
15 TABLET ORAL 2 TIMES DAILY
Qty: 180 TABLET | Refills: 0 | Status: SHIPPED | OUTPATIENT
Start: 2025-04-10

## 2025-04-10 ASSESSMENT — PATIENT HEALTH QUESTIONNAIRE - PHQ9
SUM OF ALL RESPONSES TO PHQ QUESTIONS 1-9: 15
8. MOVING OR SPEAKING SO SLOWLY THAT OTHER PEOPLE COULD HAVE NOTICED. OR THE OPPOSITE, BEING SO FIGETY OR RESTLESS THAT YOU HAVE BEEN MOVING AROUND A LOT MORE THAN USUAL: NOT AT ALL
4. FEELING TIRED OR HAVING LITTLE ENERGY: NEARLY EVERY DAY
2. FEELING DOWN, DEPRESSED OR HOPELESS: MORE THAN HALF THE DAYS
9. THOUGHTS THAT YOU WOULD BE BETTER OFF DEAD, OR OF HURTING YOURSELF: NOT AT ALL
3. TROUBLE FALLING OR STAYING ASLEEP: NEARLY EVERY DAY
SUM OF ALL RESPONSES TO PHQ QUESTIONS 1-9: 15
6. FEELING BAD ABOUT YOURSELF - OR THAT YOU ARE A FAILURE OR HAVE LET YOURSELF OR YOUR FAMILY DOWN: MORE THAN HALF THE DAYS
10. IF YOU CHECKED OFF ANY PROBLEMS, HOW DIFFICULT HAVE THESE PROBLEMS MADE IT FOR YOU TO DO YOUR WORK, TAKE CARE OF THINGS AT HOME, OR GET ALONG WITH OTHER PEOPLE: VERY DIFFICULT
SUM OF ALL RESPONSES TO PHQ QUESTIONS 1-9: 15
SUM OF ALL RESPONSES TO PHQ QUESTIONS 1-9: 15
7. TROUBLE CONCENTRATING ON THINGS, SUCH AS READING THE NEWSPAPER OR WATCHING TELEVISION: SEVERAL DAYS
1. LITTLE INTEREST OR PLEASURE IN DOING THINGS: MORE THAN HALF THE DAYS

## 2025-04-10 ASSESSMENT — ANXIETY QUESTIONNAIRES
GAD7 TOTAL SCORE: 15
3. WORRYING TOO MUCH ABOUT DIFFERENT THINGS: MORE THAN HALF THE DAYS
4. TROUBLE RELAXING: NEARLY EVERY DAY
1. FEELING NERVOUS, ANXIOUS, OR ON EDGE: NEARLY EVERY DAY
6. BECOMING EASILY ANNOYED OR IRRITABLE: SEVERAL DAYS
IF YOU CHECKED OFF ANY PROBLEMS ON THIS QUESTIONNAIRE, HOW DIFFICULT HAVE THESE PROBLEMS MADE IT FOR YOU TO DO YOUR WORK, TAKE CARE OF THINGS AT HOME, OR GET ALONG WITH OTHER PEOPLE: EXTREMELY DIFFICULT
5. BEING SO RESTLESS THAT IT IS HARD TO SIT STILL: NEARLY EVERY DAY
2. NOT BEING ABLE TO STOP OR CONTROL WORRYING: MORE THAN HALF THE DAYS
7. FEELING AFRAID AS IF SOMETHING AWFUL MIGHT HAPPEN: SEVERAL DAYS
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
## 2025-04-29 ENCOUNTER — OFFICE VISIT (OUTPATIENT)
Dept: INTERNAL MEDICINE CLINIC | Age: 40
End: 2025-04-29
Payer: MEDICARE

## 2025-04-29 VITALS
HEIGHT: 59 IN | HEART RATE: 66 BPM | DIASTOLIC BLOOD PRESSURE: 86 MMHG | SYSTOLIC BLOOD PRESSURE: 122 MMHG | BODY MASS INDEX: 48.71 KG/M2 | WEIGHT: 241.6 LBS

## 2025-04-29 DIAGNOSIS — E78.5 HYPERLIPIDEMIA, UNSPECIFIED HYPERLIPIDEMIA TYPE: ICD-10-CM

## 2025-04-29 DIAGNOSIS — Z12.31 ENCOUNTER FOR SCREENING MAMMOGRAM FOR BREAST CANCER: ICD-10-CM

## 2025-04-29 DIAGNOSIS — R06.2 WHEEZING: ICD-10-CM

## 2025-04-29 DIAGNOSIS — F32.4 MAJOR DEPRESSIVE DISORDER IN PARTIAL REMISSION, UNSPECIFIED WHETHER RECURRENT: ICD-10-CM

## 2025-04-29 DIAGNOSIS — G25.81 RESTLESS LEG: ICD-10-CM

## 2025-04-29 DIAGNOSIS — J45.40 MODERATE PERSISTENT ASTHMA WITHOUT COMPLICATION: Primary | ICD-10-CM

## 2025-04-29 DIAGNOSIS — Z77.120 SUSPECTED EXPOSURE TO MOLD: ICD-10-CM

## 2025-04-29 PROCEDURE — G8427 DOCREV CUR MEDS BY ELIG CLIN: HCPCS | Performed by: INTERNAL MEDICINE

## 2025-04-29 PROCEDURE — G8417 CALC BMI ABV UP PARAM F/U: HCPCS | Performed by: INTERNAL MEDICINE

## 2025-04-29 PROCEDURE — 4004F PT TOBACCO SCREEN RCVD TLK: CPT | Performed by: INTERNAL MEDICINE

## 2025-04-29 PROCEDURE — 99214 OFFICE O/P EST MOD 30 MIN: CPT | Performed by: INTERNAL MEDICINE

## 2025-04-29 PROCEDURE — G2211 COMPLEX E/M VISIT ADD ON: HCPCS | Performed by: INTERNAL MEDICINE

## 2025-04-29 SDOH — ECONOMIC STABILITY: FOOD INSECURITY: WITHIN THE PAST 12 MONTHS, YOU WORRIED THAT YOUR FOOD WOULD RUN OUT BEFORE YOU GOT MONEY TO BUY MORE.: NEVER TRUE

## 2025-04-29 SDOH — ECONOMIC STABILITY: FOOD INSECURITY: WITHIN THE PAST 12 MONTHS, THE FOOD YOU BOUGHT JUST DIDN'T LAST AND YOU DIDN'T HAVE MONEY TO GET MORE.: NEVER TRUE

## 2025-04-29 ASSESSMENT — ENCOUNTER SYMPTOMS
VOICE CHANGE: 0
WHEEZING: 0
SHORTNESS OF BREATH: 0
COUGH: 1
VOMITING: 0
PHOTOPHOBIA: 0
NAUSEA: 0
TROUBLE SWALLOWING: 0

## 2025-04-29 NOTE — PROGRESS NOTES
Brigette Betancourt  1985  female  40 y.o.    SUBJECTIVE:    Chief Complaint   Patient presents with    Discuss Medications     Patient is here just to discuss medications. Patient has been dealing with black mold in her house. Patient states she did just get that removed but has been having some issues breathing.       History of Present Illness  The patient is a 40-year-old female presenting for follow-up.    She recently returned to her mold-infested residence. Respiratory symptoms (cough, wheezing) improved post-relocation, but allergy symptoms persist. Mold cleaning triggered allergies. Uses Wixela inhaler 2 times daily and another inhaler twice daily. Takes allergy medication when affordable due to insurance limitations.    Currently on BuSpar for sleep issues, with an upcoming appointment with her sleep doctor next month. Plans to consult a sleep specialist if problems persist. Unable to use CPAP due to facial sores.    Reports severe joint pain and leg discomfort from climbing stairs for a month. Under Dr. Gore care, continues gabapentin, initially effective.    Mammogram scheduled for 2025, plans to discuss left breast with radiologist. Reports numerous lymph nodes.    Suspected fiber deficiency, unable to afford supplements.         Past Medical History:   Diagnosis Date    Anxiety nemo    not sure    Asthma     Depression     Hidradenitis suppurativa     HTN (hypertension)     Obesity nemo    as a kid    Restless legs syndrome nemo    before dr marie    Thyroid disease      Past Surgical History:   Procedure Laterality Date     SECTION      OTHER SURGICAL HISTORY Right 2018    EXCISION OF SEBACEOUS CYST OF BACK UPPER NECK    PAIN MANAGEMENT PROCEDURE Right 2021    RIGHT L3 TRANSFORAMINAL EPIDURAL STEROID INJECTION WITH FLUOROSCOPY performed by Cem Gore MD at [facility]    PAIN MANAGEMENT PROCEDURE Right 9/15/2022    RIGHT L3 TRANSFORAMINAL EPIDURAL STEROID INJECTION WITH

## 2025-05-09 DIAGNOSIS — G25.81 RESTLESS LEG: ICD-10-CM

## 2025-05-12 RX ORDER — GABAPENTIN 300 MG/1
300 CAPSULE ORAL NIGHTLY
Qty: 90 CAPSULE | Refills: 11 | OUTPATIENT
Start: 2025-05-12

## 2025-05-22 ENCOUNTER — OFFICE VISIT (OUTPATIENT)
Dept: PSYCHIATRY | Age: 40
End: 2025-05-22
Payer: MEDICARE

## 2025-05-22 VITALS
BODY MASS INDEX: 47.98 KG/M2 | HEIGHT: 59 IN | DIASTOLIC BLOOD PRESSURE: 62 MMHG | SYSTOLIC BLOOD PRESSURE: 98 MMHG | HEART RATE: 89 BPM | WEIGHT: 238 LBS

## 2025-05-22 DIAGNOSIS — F10.21 ALCOHOL USE DISORDER, SEVERE, IN SUSTAINED REMISSION (HCC): ICD-10-CM

## 2025-05-22 DIAGNOSIS — R06.2 WHEEZING: ICD-10-CM

## 2025-05-22 DIAGNOSIS — F43.9 TRAUMA AND STRESSOR-RELATED DISORDER: ICD-10-CM

## 2025-05-22 DIAGNOSIS — G25.81 RESTLESS LEG: ICD-10-CM

## 2025-05-22 DIAGNOSIS — L65.9 HAIR LOSS: ICD-10-CM

## 2025-05-22 DIAGNOSIS — E78.5 HYPERLIPIDEMIA, UNSPECIFIED HYPERLIPIDEMIA TYPE: ICD-10-CM

## 2025-05-22 DIAGNOSIS — E78.01 FAMILIAL HYPERCHOLESTEROLEMIA: ICD-10-CM

## 2025-05-22 DIAGNOSIS — J45.40 MODERATE PERSISTENT ASTHMA WITHOUT COMPLICATION: ICD-10-CM

## 2025-05-22 DIAGNOSIS — F32.A DEPRESSION, UNSPECIFIED DEPRESSION TYPE: ICD-10-CM

## 2025-05-22 DIAGNOSIS — F32.4 MAJOR DEPRESSIVE DISORDER IN PARTIAL REMISSION, UNSPECIFIED WHETHER RECURRENT: ICD-10-CM

## 2025-05-22 DIAGNOSIS — F33.1 MODERATE EPISODE OF RECURRENT MAJOR DEPRESSIVE DISORDER (HCC): Primary | ICD-10-CM

## 2025-05-22 PROCEDURE — 4004F PT TOBACCO SCREEN RCVD TLK: CPT | Performed by: PSYCHIATRY & NEUROLOGY

## 2025-05-22 PROCEDURE — G8427 DOCREV CUR MEDS BY ELIG CLIN: HCPCS | Performed by: PSYCHIATRY & NEUROLOGY

## 2025-05-22 PROCEDURE — 99214 OFFICE O/P EST MOD 30 MIN: CPT | Performed by: PSYCHIATRY & NEUROLOGY

## 2025-05-22 PROCEDURE — G8417 CALC BMI ABV UP PARAM F/U: HCPCS | Performed by: PSYCHIATRY & NEUROLOGY

## 2025-05-22 RX ORDER — BUSPIRONE HYDROCHLORIDE 30 MG/1
30 TABLET ORAL 2 TIMES DAILY
Qty: 180 TABLET | Refills: 0 | Status: SHIPPED | OUTPATIENT
Start: 2025-05-22

## 2025-05-22 RX ORDER — FLUOXETINE HYDROCHLORIDE 40 MG/1
80 CAPSULE ORAL DAILY
Qty: 180 CAPSULE | Refills: 1 | Status: SHIPPED | OUTPATIENT
Start: 2025-05-22

## 2025-05-22 ASSESSMENT — PATIENT HEALTH QUESTIONNAIRE - PHQ9
7. TROUBLE CONCENTRATING ON THINGS, SUCH AS READING THE NEWSPAPER OR WATCHING TELEVISION: MORE THAN HALF THE DAYS
7. TROUBLE CONCENTRATING ON THINGS, SUCH AS READING THE NEWSPAPER OR WATCHING TELEVISION: MORE THAN HALF THE DAYS
9. THOUGHTS THAT YOU WOULD BE BETTER OFF DEAD, OR OF HURTING YOURSELF: NOT AT ALL
SUM OF ALL RESPONSES TO PHQ QUESTIONS 1-9: 17
SUM OF ALL RESPONSES TO PHQ QUESTIONS 1-9: 17
8. MOVING OR SPEAKING SO SLOWLY THAT OTHER PEOPLE COULD HAVE NOTICED. OR THE OPPOSITE, BEING SO FIGETY OR RESTLESS THAT YOU HAVE BEEN MOVING AROUND A LOT MORE THAN USUAL: NOT AT ALL
9. THOUGHTS THAT YOU WOULD BE BETTER OFF DEAD, OR OF HURTING YOURSELF: NOT AT ALL
4. FEELING TIRED OR HAVING LITTLE ENERGY: NEARLY EVERY DAY
4. FEELING TIRED OR HAVING LITTLE ENERGY: NEARLY EVERY DAY
5. POOR APPETITE OR OVEREATING: NEARLY EVERY DAY
2. FEELING DOWN, DEPRESSED OR HOPELESS: MORE THAN HALF THE DAYS
3. TROUBLE FALLING OR STAYING ASLEEP: MORE THAN HALF THE DAYS
2. FEELING DOWN, DEPRESSED OR HOPELESS: MORE THAN HALF THE DAYS
1. LITTLE INTEREST OR PLEASURE IN DOING THINGS: MORE THAN HALF THE DAYS
10. IF YOU CHECKED OFF ANY PROBLEMS, HOW DIFFICULT HAVE THESE PROBLEMS MADE IT FOR YOU TO DO YOUR WORK, TAKE CARE OF THINGS AT HOME, OR GET ALONG WITH OTHER PEOPLE: VERY DIFFICULT
10. IF YOU CHECKED OFF ANY PROBLEMS, HOW DIFFICULT HAVE THESE PROBLEMS MADE IT FOR YOU TO DO YOUR WORK, TAKE CARE OF THINGS AT HOME, OR GET ALONG WITH OTHER PEOPLE: VERY DIFFICULT
SUM OF ALL RESPONSES TO PHQ QUESTIONS 1-9: 17
3. TROUBLE FALLING OR STAYING ASLEEP: MORE THAN HALF THE DAYS
8. MOVING OR SPEAKING SO SLOWLY THAT OTHER PEOPLE COULD HAVE NOTICED. OR THE OPPOSITE - BEING SO FIDGETY OR RESTLESS THAT YOU HAVE BEEN MOVING AROUND A LOT MORE THAN USUAL: NOT AT ALL
6. FEELING BAD ABOUT YOURSELF - OR THAT YOU ARE A FAILURE OR HAVE LET YOURSELF OR YOUR FAMILY DOWN: NEARLY EVERY DAY
SUM OF ALL RESPONSES TO PHQ QUESTIONS 1-9: 17
SUM OF ALL RESPONSES TO PHQ QUESTIONS 1-9: 17
5. POOR APPETITE OR OVEREATING: NEARLY EVERY DAY
1. LITTLE INTEREST OR PLEASURE IN DOING THINGS: MORE THAN HALF THE DAYS
6. FEELING BAD ABOUT YOURSELF - OR THAT YOU ARE A FAILURE OR HAVE LET YOURSELF OR YOUR FAMILY DOWN: NEARLY EVERY DAY

## 2025-05-22 ASSESSMENT — ANXIETY QUESTIONNAIRES
1. FEELING NERVOUS, ANXIOUS, OR ON EDGE: NEARLY EVERY DAY
6. BECOMING EASILY ANNOYED OR IRRITABLE: MORE THAN HALF THE DAYS
GAD7 TOTAL SCORE: 18
3. WORRYING TOO MUCH ABOUT DIFFERENT THINGS: NEARLY EVERY DAY
IF YOU CHECKED OFF ANY PROBLEMS ON THIS QUESTIONNAIRE, HOW DIFFICULT HAVE THESE PROBLEMS MADE IT FOR YOU TO DO YOUR WORK, TAKE CARE OF THINGS AT HOME, OR GET ALONG WITH OTHER PEOPLE: VERY DIFFICULT
5. BEING SO RESTLESS THAT IT IS HARD TO SIT STILL: MORE THAN HALF THE DAYS
2. NOT BEING ABLE TO STOP OR CONTROL WORRYING: MORE THAN HALF THE DAYS
1. FEELING NERVOUS, ANXIOUS, OR ON EDGE: NEARLY EVERY DAY
4. TROUBLE RELAXING: NEARLY EVERY DAY
4. TROUBLE RELAXING: NEARLY EVERY DAY
5. BEING SO RESTLESS THAT IT IS HARD TO SIT STILL: MORE THAN HALF THE DAYS
7. FEELING AFRAID AS IF SOMETHING AWFUL MIGHT HAPPEN: NEARLY EVERY DAY
7. FEELING AFRAID AS IF SOMETHING AWFUL MIGHT HAPPEN: NEARLY EVERY DAY
6. BECOMING EASILY ANNOYED OR IRRITABLE: MORE THAN HALF THE DAYS
3. WORRYING TOO MUCH ABOUT DIFFERENT THINGS: NEARLY EVERY DAY
2. NOT BEING ABLE TO STOP OR CONTROL WORRYING: MORE THAN HALF THE DAYS
IF YOU CHECKED OFF ANY PROBLEMS ON THIS QUESTIONNAIRE, HOW DIFFICULT HAVE THESE PROBLEMS MADE IT FOR YOU TO DO YOUR WORK, TAKE CARE OF THINGS AT HOME, OR GET ALONG WITH OTHER PEOPLE: VERY DIFFICULT
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
## 2025-05-23 ENCOUNTER — RESULTS FOLLOW-UP (OUTPATIENT)
Dept: PSYCHIATRY | Age: 40
End: 2025-05-23

## 2025-05-23 ENCOUNTER — RESULTS FOLLOW-UP (OUTPATIENT)
Dept: INTERNAL MEDICINE CLINIC | Age: 40
End: 2025-05-23

## 2025-05-23 LAB
ALBUMIN SERPL-MCNC: 4.5 G/DL (ref 3.4–5)
ALP SERPL-CCNC: 93 U/L (ref 40–129)
ALT SERPL-CCNC: 31 U/L (ref 10–40)
ANION GAP SERPL CALCULATED.3IONS-SCNC: 10 MMOL/L (ref 3–16)
AST SERPL-CCNC: 23 U/L (ref 15–37)
BASOPHILS # BLD: 0.1 K/UL (ref 0–0.2)
BASOPHILS NFR BLD: 1 %
BILIRUB DIRECT SERPL-MCNC: <0.1 MG/DL (ref 0–0.3)
BILIRUB INDIRECT SERPL-MCNC: 0.2 MG/DL (ref 0–1)
BILIRUB SERPL-MCNC: 0.3 MG/DL (ref 0–1)
BUN SERPL-MCNC: 8 MG/DL (ref 7–20)
CALCIUM SERPL-MCNC: 9.6 MG/DL (ref 8.3–10.6)
CHLORIDE SERPL-SCNC: 102 MMOL/L (ref 99–110)
CHOLEST SERPL-MCNC: 170 MG/DL (ref 0–199)
CO2 SERPL-SCNC: 22 MMOL/L (ref 21–32)
CREAT SERPL-MCNC: 0.8 MG/DL (ref 0.6–1.1)
DEPRECATED RDW RBC AUTO: 14 % (ref 12.4–15.4)
EOSINOPHIL # BLD: 0.4 K/UL (ref 0–0.6)
EOSINOPHIL NFR BLD: 3.5 %
GFR SERPLBLD CREATININE-BSD FMLA CKD-EPI: >90 ML/MIN/{1.73_M2}
GLUCOSE SERPL-MCNC: 92 MG/DL (ref 70–99)
HCT VFR BLD AUTO: 46.8 % (ref 36–48)
HDLC SERPL-MCNC: 56 MG/DL (ref 40–60)
HGB BLD-MCNC: 16 G/DL (ref 12–16)
LDL CHOLESTEROL: 93 MG/DL
LYMPHOCYTES # BLD: 3 K/UL (ref 1–5.1)
LYMPHOCYTES NFR BLD: 29 %
MCH RBC QN AUTO: 32.4 PG (ref 26–34)
MCHC RBC AUTO-ENTMCNC: 34.2 G/DL (ref 31–36)
MCV RBC AUTO: 94.7 FL (ref 80–100)
MONOCYTES # BLD: 0.9 K/UL (ref 0–1.3)
MONOCYTES NFR BLD: 8.4 %
NEUTROPHILS # BLD: 6 K/UL (ref 1.7–7.7)
NEUTROPHILS NFR BLD: 58.1 %
PLATELET # BLD AUTO: 229 K/UL (ref 135–450)
PMV BLD AUTO: 12.2 FL (ref 5–10.5)
POTASSIUM SERPL-SCNC: 4.8 MMOL/L (ref 3.5–5.1)
PROT SERPL-MCNC: 7.1 G/DL (ref 6.4–8.2)
RBC # BLD AUTO: 4.94 M/UL (ref 4–5.2)
SODIUM SERPL-SCNC: 134 MMOL/L (ref 136–145)
TRIGL SERPL-MCNC: 107 MG/DL (ref 0–150)
TSH SERPL DL<=0.005 MIU/L-ACNC: 3.08 UIU/ML (ref 0.27–4.2)
VLDLC SERPL CALC-MCNC: 21 MG/DL
WBC # BLD AUTO: 10.3 K/UL (ref 4–11)

## 2025-06-16 ENCOUNTER — HOSPITAL ENCOUNTER (OUTPATIENT)
Age: 40
Discharge: HOME | End: 2025-06-16
Payer: MEDICARE

## 2025-06-16 ENCOUNTER — HOSPITAL ENCOUNTER (OUTPATIENT)
Dept: GENERAL RADIOLOGY | Age: 40
Discharge: HOME | End: 2025-06-16
Payer: MEDICARE

## 2025-06-16 ENCOUNTER — OFFICE VISIT (OUTPATIENT)
Dept: PULMONOLOGY | Age: 40
End: 2025-06-16
Payer: MEDICARE

## 2025-06-16 VITALS
BODY MASS INDEX: 46.57 KG/M2 | DIASTOLIC BLOOD PRESSURE: 74 MMHG | HEART RATE: 72 BPM | OXYGEN SATURATION: 91 % | SYSTOLIC BLOOD PRESSURE: 126 MMHG | HEIGHT: 59 IN | WEIGHT: 231 LBS

## 2025-06-16 DIAGNOSIS — F17.210 CIGARETTE NICOTINE DEPENDENCE WITHOUT COMPLICATION: ICD-10-CM

## 2025-06-16 DIAGNOSIS — E66.01 MORBID OBESITY WITH BMI OF 45.0-49.9, ADULT (HCC): ICD-10-CM

## 2025-06-16 DIAGNOSIS — J45.30 MILD PERSISTENT ASTHMA WITHOUT COMPLICATION: Primary | ICD-10-CM

## 2025-06-16 DIAGNOSIS — G47.33 OSA (OBSTRUCTIVE SLEEP APNEA): ICD-10-CM

## 2025-06-16 DIAGNOSIS — J45.30 MILD PERSISTENT ASTHMA WITHOUT COMPLICATION: ICD-10-CM

## 2025-06-16 PROCEDURE — 82785 ASSAY OF IGE: CPT

## 2025-06-16 PROCEDURE — 99407 BEHAV CHNG SMOKING > 10 MIN: CPT | Performed by: INTERNAL MEDICINE

## 2025-06-16 PROCEDURE — 71046 X-RAY EXAM CHEST 2 VIEWS: CPT

## 2025-06-16 PROCEDURE — 99204 OFFICE O/P NEW MOD 45 MIN: CPT | Performed by: INTERNAL MEDICINE

## 2025-06-16 PROCEDURE — 4004F PT TOBACCO SCREEN RCVD TLK: CPT | Performed by: INTERNAL MEDICINE

## 2025-06-16 PROCEDURE — 36415 COLL VENOUS BLD VENIPUNCTURE: CPT

## 2025-06-16 PROCEDURE — G8427 DOCREV CUR MEDS BY ELIG CLIN: HCPCS | Performed by: INTERNAL MEDICINE

## 2025-06-16 PROCEDURE — G8417 CALC BMI ABV UP PARAM F/U: HCPCS | Performed by: INTERNAL MEDICINE

## 2025-06-16 PROCEDURE — 86003 ALLG SPEC IGE CRUDE XTRC EA: CPT

## 2025-06-16 RX ORDER — BUPROPION HYDROCHLORIDE 150 MG/1
150 TABLET, EXTENDED RELEASE ORAL 2 TIMES DAILY
Qty: 60 TABLET | Refills: 0 | Status: SHIPPED | OUTPATIENT
Start: 2025-06-16

## 2025-06-16 NOTE — PROGRESS NOTES
PULMONARY OFFICE NEW PATIENT VISIT    CONSULTING PHYSICIAN:  SANCHO Ramos MD     REASON FOR VISIT:   Chief Complaint   Patient presents with    New Patient     Ref by Dr Ramos    Shortness of Breath       DATE OF VISIT: 6/16/2025    HISTORY OF PRESENT ILLNESS: 40 y.o. year old female comes into the pulmonary office for the first time.  Patient reports that since her childhood she has been suffering with asthma.  Has been on Advair or Wixela inhaler for last several years.  Also uses albuterol as needed.  Has been needing it more often recently.  Also uses nebulized albuterol.  Has woken up in the nighttime to use the albuterol inhaler.  Does have shortness of breath throughout the day, associated with wheezing and cough.  Cough is productive of whitish expectoration.  Reports that she has been living in the same house for last 17 years.  Recently black mold was discovered in the house and this was cleaned up.  Also has multiple cats at home.  Continues to smoke about quarter pack of cigarette daily.  Has been smoking for a long time.  Has tried Chantix in the past but it caused suicidal thoughts.  Also suffering with obstructive sleep apnea and follows with Dr. Liang.  Not using her CPAP therapy regularly.  Also follow-up with the psychiatrist for major depressive disorder.      REVIEW OF SYSTEMS:   CONSTITUTIONAL SYMPTOMS: The patient denies fever, fatigue, night sweats, weight loss or weight gain.   HEENT: No vision changes. No tinnitus, Denies sinus pain. No hoarseness, or dysphagia.   NECK: Patient denies swelling in the neck.   CARDIOVASCULAR: Denies chest pain, palpitation, syncope.  RESPIRATORY: As per HPI.  GASTROINTESTINAL: Denies nausea, abdominal pain or change in bowel function.  GENITOURINARY: Denies obstructive symptoms. No history of incontinence.  BREASTS: No masses or lumps in the breasts.   SKIN: No rashes or itching.   MUSCULOSKELETAL: Denies weakness or bone pain.   NEUROLOGICAL: No

## 2025-06-18 ENCOUNTER — RESULTS FOLLOW-UP (OUTPATIENT)
Dept: PULMONOLOGY | Age: 40
End: 2025-06-18

## 2025-06-18 LAB — IGE SERPL-ACNC: 192 KU/L

## 2025-06-19 LAB
A ALTERNATA IGE QN: <0.1 KU/L (ref 0–0.34)
A FUMIGATUS IGE QN: <0.1 KU/L (ref 0–0.34)
AMER SYCAMORE IGE QN: <0.1 KU/L (ref 0–0.34)
BERMUDA GRASS IGE QN: <0.1 KU/L (ref 0–0.34)
BOXELDER IGE QN: <0.1 KU/L (ref 0–0.34)
C SPHAEROSPERMUM IGE QN: <0.1 KUL/L (ref 0–0.34)
CALIF WALNUT IGE QN: <0.1 KU/L (ref 0–0.34)
CAT DANDER IGE QN: 2.35 KU/L (ref 0–0.34)
CMN PIGWEED IGE QN: <0.1 KU/L (ref 0–0.34)
COMMON RAGWEED IGE QN: <0.1 KU/L (ref 0–0.34)
COTTONWOOD IGE QN: <0.1 KU/L (ref 0–0.34)
D FARINAE IGE QN: <0.1 KU/L (ref 0–0.34)
D PTERONYSS IGE QN: 0.13 KU/L (ref 0–0.34)
DOG DANDER IGE QN: <0.1 KU/L (ref 0–0.34)
IGE SERPL-ACNC: 234 IU/ML (ref 0–100)
M RACEMOSUS IGE QN: <0.1 KU/L (ref 0–0.34)
MOUSE EPITH IGE QN: <0.1 KU/L (ref 0–0.34)
P NOTATUM IGE QN: <0.1 KU/L (ref 0–0.34)
PECAN/HICK TREE IGE QN: <0.1 KU/L (ref 0–0.34)
RED CEDAR IGE QN: <0.1 KU/L (ref 0–0.34)
ROACH IGE QN: 0.11 KU/L (ref 0–0.34)
SALTWORT IGE QN: <0.1 KU/L (ref 0–0.34)
SHEEP SORREL IGE QN: <0.1 KU/L (ref 0–0.34)
SILVER BIRCH IGE QN: <0.1 KU/L (ref 0–0.34)
TIMOTHY IGE QN: 0.26 KU/L (ref 0–0.34)
WHITE ASH IGE QN: <0.1 KU/L (ref 0–0.34)
WHITE ELM IGE QN: <0.1 KU/L (ref 0–0.34)
WHITE MULBERRY IGE QN: <0.1 KU/L (ref 0–0.34)
WHITE OAK IGE QN: <0.1 KU/L (ref 0–0.34)

## 2025-07-02 ENCOUNTER — TELEPHONE (OUTPATIENT)
Dept: INTERNAL MEDICINE CLINIC | Age: 40
End: 2025-07-02

## 2025-07-02 ENCOUNTER — PATIENT MESSAGE (OUTPATIENT)
Dept: INTERNAL MEDICINE CLINIC | Age: 40
End: 2025-07-02

## 2025-07-02 DIAGNOSIS — G25.81 RESTLESS LEG: ICD-10-CM

## 2025-07-02 DIAGNOSIS — Z12.31 ENCOUNTER FOR SCREENING MAMMOGRAM FOR MALIGNANT NEOPLASM OF BREAST: ICD-10-CM

## 2025-07-02 DIAGNOSIS — N64.4 PAIN OF BOTH BREASTS: Primary | ICD-10-CM

## 2025-07-02 RX ORDER — GABAPENTIN 300 MG/1
300 CAPSULE ORAL NIGHTLY
Qty: 90 CAPSULE | Refills: 0 | Status: SHIPPED | OUTPATIENT
Start: 2025-07-02 | End: 2025-09-30

## 2025-07-02 NOTE — TELEPHONE ENCOUNTER
OKAY FOR FILL    Medication:   Requested Prescriptions     Pending Prescriptions Disp Refills    gabapentin (NEURONTIN) 300 MG capsule 90 capsule 0     Sig: Take 1 capsule by mouth nightly for 90 days.        Last Ordered: 2/12/25  Last Filled:      Patient Phone Number: [phone] (home)     Last appt: 4/29/2025   Next appt: 7/29/2025    Last OARRS:        No data to display

## 2025-07-02 NOTE — TELEPHONE ENCOUNTER
Pt calling in about mammogram order in chart.    When she called to schedule that she could not because she reported nipple pain. She says he has left nipple pain which is not new and says  knows about that. She said she has a skin condition where she sometimes gets boils to the nipple area so her pain may be associated with that but she was not able to schedule the mammogram screen with her reported symptoms. Is  willing to order a diagnostic mammogram?

## 2025-07-03 ENCOUNTER — TELEPHONE (OUTPATIENT)
Dept: PSYCHIATRY | Age: 40
End: 2025-07-03

## 2025-07-03 RX ORDER — BUPROPION HYDROCHLORIDE 150 MG/1
150 TABLET, EXTENDED RELEASE ORAL 2 TIMES DAILY
Qty: 60 TABLET | Refills: 11 | Status: SHIPPED | OUTPATIENT
Start: 2025-07-03

## 2025-07-25 ENCOUNTER — APPOINTMENT (OUTPATIENT)
Dept: CT IMAGING | Age: 40
End: 2025-07-25
Payer: MEDICARE

## 2025-07-25 ENCOUNTER — HOSPITAL ENCOUNTER (EMERGENCY)
Age: 40
Discharge: HOME | End: 2025-07-25
Payer: MEDICARE

## 2025-07-25 ENCOUNTER — APPOINTMENT (OUTPATIENT)
Dept: GENERAL RADIOLOGY | Age: 40
End: 2025-07-25
Payer: MEDICARE

## 2025-07-25 VITALS
HEIGHT: 59 IN | DIASTOLIC BLOOD PRESSURE: 89 MMHG | RESPIRATION RATE: 16 BRPM | OXYGEN SATURATION: 95 % | BODY MASS INDEX: 43.55 KG/M2 | SYSTOLIC BLOOD PRESSURE: 113 MMHG | TEMPERATURE: 97.8 F | HEART RATE: 65 BPM | WEIGHT: 216 LBS

## 2025-07-25 DIAGNOSIS — N10 ACUTE PYELONEPHRITIS: Primary | ICD-10-CM

## 2025-07-25 DIAGNOSIS — B37.0 ORAL THRUSH: ICD-10-CM

## 2025-07-25 DIAGNOSIS — T83.32XA MALPOSITIONED INTRAUTERINE DEVICE (IUD), INITIAL ENCOUNTER: ICD-10-CM

## 2025-07-25 DIAGNOSIS — R55 NEAR SYNCOPE: ICD-10-CM

## 2025-07-25 LAB
ALBUMIN SERPL-MCNC: 4.2 G/DL (ref 3.4–5)
ALBUMIN/GLOB SERPL: 1.4 {RATIO} (ref 1.1–2.2)
ALP SERPL-CCNC: 92 U/L (ref 40–129)
ALT SERPL-CCNC: 21 U/L (ref 10–40)
ANION GAP SERPL CALCULATED.3IONS-SCNC: 15 MMOL/L (ref 3–16)
AST SERPL-CCNC: 20 U/L (ref 15–37)
BACTERIA URNS QL MICRO: ABNORMAL /HPF
BASOPHILS # BLD: 0.1 K/UL (ref 0–0.2)
BASOPHILS NFR BLD: 0.7 %
BILIRUB SERPL-MCNC: 0.5 MG/DL (ref 0–1)
BILIRUB UR QL STRIP.AUTO: ABNORMAL
BUN SERPL-MCNC: 6 MG/DL (ref 7–20)
CALCIUM SERPL-MCNC: 10 MG/DL (ref 8.3–10.6)
CHLORIDE SERPL-SCNC: 104 MMOL/L (ref 99–110)
CLARITY UR: ABNORMAL
CO2 SERPL-SCNC: 20 MMOL/L (ref 21–32)
COLOR UR: YELLOW
CREAT SERPL-MCNC: 0.8 MG/DL (ref 0.6–1.1)
DEPRECATED RDW RBC AUTO: 14.5 % (ref 12.4–15.4)
EOSINOPHIL # BLD: 0.2 K/UL (ref 0–0.6)
EOSINOPHIL NFR BLD: 1.7 %
EPI CELLS #/AREA URNS AUTO: 69 /HPF (ref 0–5)
GFR SERPLBLD CREATININE-BSD FMLA CKD-EPI: >90 ML/MIN/{1.73_M2}
GLUCOSE SERPL-MCNC: 100 MG/DL (ref 70–99)
GLUCOSE UR STRIP.AUTO-MCNC: NEGATIVE MG/DL
HCG SERPL QL: NEGATIVE
HCT VFR BLD AUTO: 43 % (ref 36–48)
HGB BLD-MCNC: 15 G/DL (ref 12–16)
HGB UR QL STRIP.AUTO: ABNORMAL
HYALINE CASTS #/AREA URNS AUTO: 2 /LPF (ref 0–8)
KETONES UR STRIP.AUTO-MCNC: ABNORMAL MG/DL
LACTATE BLDV-SCNC: 1.2 MMOL/L (ref 0.4–1.9)
LEUKOCYTE ESTERASE UR QL STRIP.AUTO: ABNORMAL
LIPASE SERPL-CCNC: 19 U/L (ref 13–60)
LYMPHOCYTES # BLD: 3.3 K/UL (ref 1–5.1)
LYMPHOCYTES NFR BLD: 21.9 %
MAGNESIUM SERPL-MCNC: 1.91 MG/DL (ref 1.8–2.4)
MCH RBC QN AUTO: 32.7 PG (ref 26–34)
MCHC RBC AUTO-ENTMCNC: 34.9 G/DL (ref 31–36)
MCV RBC AUTO: 93.7 FL (ref 80–100)
MONOCYTES # BLD: 1.2 K/UL (ref 0–1.3)
MONOCYTES NFR BLD: 7.9 %
NEUTROPHILS # BLD: 10.1 K/UL (ref 1.7–7.7)
NEUTROPHILS NFR BLD: 67.8 %
NITRITE UR QL STRIP.AUTO: POSITIVE
PH UR STRIP.AUTO: 5.5 [PH] (ref 5–8)
PLATELET # BLD AUTO: 198 K/UL (ref 135–450)
PMV BLD AUTO: 10.3 FL (ref 5–10.5)
POTASSIUM SERPL-SCNC: 3.5 MMOL/L (ref 3.5–5.1)
PROT SERPL-MCNC: 7.3 G/DL (ref 6.4–8.2)
PROT UR STRIP.AUTO-MCNC: 100 MG/DL
RBC # BLD AUTO: 4.59 M/UL (ref 4–5.2)
RBC CLUMPS #/AREA URNS AUTO: 3140 /HPF (ref 0–4)
SODIUM SERPL-SCNC: 139 MMOL/L (ref 136–145)
SP GR UR STRIP.AUTO: >=1.03 (ref 1–1.03)
TROPONIN, HIGH SENSITIVITY: <6 NG/L (ref 0–14)
UA COMPLETE W REFLEX CULTURE PNL UR: YES
UA DIPSTICK W REFLEX MICRO PNL UR: YES
URN SPEC COLLECT METH UR: ABNORMAL
UROBILINOGEN UR STRIP-ACNC: 1 E.U./DL
WBC # BLD AUTO: 14.9 K/UL (ref 4–11)
WBC #/AREA URNS AUTO: 34 /HPF (ref 0–5)

## 2025-07-25 PROCEDURE — 81001 URINALYSIS AUTO W/SCOPE: CPT

## 2025-07-25 PROCEDURE — 2500000003 HC RX 250 WO HCPCS: Performed by: PHYSICIAN ASSISTANT

## 2025-07-25 PROCEDURE — 93005 ELECTROCARDIOGRAM TRACING: CPT | Performed by: PHYSICIAN ASSISTANT

## 2025-07-25 PROCEDURE — 6360000002 HC RX W HCPCS: Performed by: PHYSICIAN ASSISTANT

## 2025-07-25 PROCEDURE — 84484 ASSAY OF TROPONIN QUANT: CPT

## 2025-07-25 PROCEDURE — 74176 CT ABD & PELVIS W/O CONTRAST: CPT

## 2025-07-25 PROCEDURE — 2580000003 HC RX 258: Performed by: PHYSICIAN ASSISTANT

## 2025-07-25 PROCEDURE — 83605 ASSAY OF LACTIC ACID: CPT

## 2025-07-25 PROCEDURE — 85025 COMPLETE CBC W/AUTO DIFF WBC: CPT

## 2025-07-25 PROCEDURE — 80053 COMPREHEN METABOLIC PANEL: CPT

## 2025-07-25 PROCEDURE — 96361 HYDRATE IV INFUSION ADD-ON: CPT

## 2025-07-25 PROCEDURE — 99285 EMERGENCY DEPT VISIT HI MDM: CPT

## 2025-07-25 PROCEDURE — 96374 THER/PROPH/DIAG INJ IV PUSH: CPT

## 2025-07-25 PROCEDURE — 87086 URINE CULTURE/COLONY COUNT: CPT

## 2025-07-25 PROCEDURE — 71045 X-RAY EXAM CHEST 1 VIEW: CPT

## 2025-07-25 PROCEDURE — 83690 ASSAY OF LIPASE: CPT

## 2025-07-25 PROCEDURE — 84703 CHORIONIC GONADOTROPIN ASSAY: CPT

## 2025-07-25 PROCEDURE — 83735 ASSAY OF MAGNESIUM: CPT

## 2025-07-25 RX ORDER — CEPHALEXIN 500 MG/1
500 CAPSULE ORAL 4 TIMES DAILY
Qty: 40 CAPSULE | Refills: 0 | Status: SHIPPED | OUTPATIENT
Start: 2025-07-25 | End: 2025-08-04

## 2025-07-25 RX ORDER — NYSTATIN 100000 [USP'U]/ML
500000 SUSPENSION ORAL 4 TIMES DAILY
Qty: 280 ML | Refills: 0 | Status: SHIPPED | OUTPATIENT
Start: 2025-07-25 | End: 2025-08-08

## 2025-07-25 RX ORDER — ONDANSETRON 4 MG/1
4-8 TABLET, ORALLY DISINTEGRATING ORAL EVERY 12 HOURS PRN
Qty: 20 TABLET | Refills: 0 | Status: SHIPPED | OUTPATIENT
Start: 2025-07-25

## 2025-07-25 RX ORDER — 0.9 % SODIUM CHLORIDE 0.9 %
1000 INTRAVENOUS SOLUTION INTRAVENOUS ONCE
Status: COMPLETED | OUTPATIENT
Start: 2025-07-25 | End: 2025-07-25

## 2025-07-25 RX ADMIN — SODIUM CHLORIDE 1000 ML: 9 INJECTION, SOLUTION INTRAVENOUS at 21:11

## 2025-07-25 RX ADMIN — WATER 1000 MG: 1 INJECTION INTRAMUSCULAR; INTRAVENOUS; SUBCUTANEOUS at 21:09

## 2025-07-25 ASSESSMENT — PAIN DESCRIPTION - LOCATION: LOCATION: GENERALIZED

## 2025-07-25 ASSESSMENT — PAIN DESCRIPTION - DESCRIPTORS: DESCRIPTORS: DISCOMFORT

## 2025-07-25 ASSESSMENT — PAIN - FUNCTIONAL ASSESSMENT: PAIN_FUNCTIONAL_ASSESSMENT: 0-10

## 2025-07-25 ASSESSMENT — PAIN SCALES - GENERAL: PAINLEVEL_OUTOF10: 8

## 2025-07-25 NOTE — ED PROVIDER NOTES
I was available for consultation during patient's ED stay.  Patient was cared for by ALEA.  I did not evaluate or participate in patient care.    EKG Interpretation    Interpreted by emergency department physician    Rhythm: normal sinus   Rate: normal  Axis: normal  Ectopy: none  Conduction: normal  ST Segments: Nonspecific changes  T Waves: Nonspecific changes  Q Waves: none    Clinical Impression: Normal sinus rhythm, no significant T wave or ST changes.  Normal ME interval, normal QRS duration, normal QT QTC.  Normal axis.  New biphasic T waves in nonspecific T wave changes in leads V3 through V6 compared to previous EKG dated November 5, 2017.  Interpreted by myself.          MD Magno Henderson Stephen W, MD  07/25/25 2183    
of symptoms or problems at home.    Disposition Considerations (tests considered but not done, Admit vs D/C, Shared Decision Making, Pt Expectation of Test or Tx.):        I am the Primary Clinician of Record.  FINAL IMPRESSION      1. Acute pyelonephritis    2. Oral thrush    3. Near syncope    4. Malpositioned intrauterine device (IUD), initial encounter          DISPOSITION/PLAN     DISPOSITION Decision To Discharge 07/25/2025 10:34:27 PM   DISPOSITION CONDITION Stable           PATIENT REFERRED TO:  SANCHO Ramos MD  [address] [facility] [address]  [phone]    Call   As needed, go to your appointment scheduled on 7/29    Zamzam Ortiz MD  [address]  [facility] [address] [phone]    Schedule an appointment as soon as possible for a visit   For re-check for malpositioned IUD    [facility] Emergency Department  [address] [id number]  [phone]    If symptoms worsen      DISCHARGE MEDICATIONS:  Discharge Medication List as of 7/25/2025 11:06 PM        START taking these medications    Details   cephALEXin (KEFLEX) 500 MG capsule Take 1 capsule by mouth 4 times daily for 10 days, Disp-40 capsule, R-0Normal      ondansetron (ZOFRAN-ODT) 4 MG disintegrating tablet Take 1-2 tablets by mouth every 12 hours as needed for Nausea or Vomiting, Disp-20 tablet, R-0Normal      nystatin (MYCOSTATIN) 689121 UNIT/ML suspension Take 5 mLs by mouth 4 times daily for 14 days Swish for several minutes, then swallow, Oral, 4 TIMES DAILY Starting Fri 7/25/2025, Until Fri 8/8/2025, For 14 days, Disp-280 mL, R-0, Normal             DISCONTINUED MEDICATIONS:  Discharge Medication List as of 7/25/2025 11:06 PM                 (Please note that portions of this note were completed with a voice recognition program.  Efforts were made to edit the dictations but occasionally words are mis-transcribed.)    Jerome Kapoor PA-C (electronically signed)        Jerome Kapoor

## 2025-07-26 LAB
BACTERIA UR CULT: NORMAL
EKG ATRIAL RATE: 72 BPM
EKG DIAGNOSIS: NORMAL
EKG P AXIS: 48 DEGREES
EKG P-R INTERVAL: 140 MS
EKG Q-T INTERVAL: 414 MS
EKG QRS DURATION: 80 MS
EKG QTC CALCULATION (BAZETT): 453 MS
EKG R AXIS: 44 DEGREES
EKG T AXIS: -23 DEGREES
EKG VENTRICULAR RATE: 72 BPM

## 2025-07-26 PROCEDURE — 93010 ELECTROCARDIOGRAM REPORT: CPT | Performed by: INTERNAL MEDICINE

## 2025-07-29 ENCOUNTER — OFFICE VISIT (OUTPATIENT)
Dept: INTERNAL MEDICINE CLINIC | Age: 40
End: 2025-07-29
Payer: MEDICARE

## 2025-07-29 ENCOUNTER — CARE COORDINATION (OUTPATIENT)
Dept: CARE COORDINATION | Age: 40
End: 2025-07-29

## 2025-07-29 VITALS
HEART RATE: 77 BPM | DIASTOLIC BLOOD PRESSURE: 76 MMHG | OXYGEN SATURATION: 94 % | SYSTOLIC BLOOD PRESSURE: 116 MMHG | WEIGHT: 221 LBS | WEIGHT: 221 LBS | BODY MASS INDEX: 44.55 KG/M2 | OXYGEN SATURATION: 94 % | SYSTOLIC BLOOD PRESSURE: 116 MMHG | HEIGHT: 59 IN | HEART RATE: 77 BPM | BODY MASS INDEX: 44.64 KG/M2 | DIASTOLIC BLOOD PRESSURE: 76 MMHG

## 2025-07-29 DIAGNOSIS — K02.9 DENTAL CARIES: ICD-10-CM

## 2025-07-29 DIAGNOSIS — Z00.00 INITIAL MEDICARE ANNUAL WELLNESS VISIT: Primary | ICD-10-CM

## 2025-07-29 DIAGNOSIS — D72.829 LEUKOCYTOSIS, UNSPECIFIED TYPE: ICD-10-CM

## 2025-07-29 DIAGNOSIS — E78.5 HYPERLIPIDEMIA, UNSPECIFIED HYPERLIPIDEMIA TYPE: ICD-10-CM

## 2025-07-29 DIAGNOSIS — N39.0 URINARY TRACT INFECTION WITH HEMATURIA, SITE UNSPECIFIED: Primary | ICD-10-CM

## 2025-07-29 DIAGNOSIS — K13.0 ANGULAR STOMATITIS: ICD-10-CM

## 2025-07-29 DIAGNOSIS — R31.9 URINARY TRACT INFECTION WITH HEMATURIA, SITE UNSPECIFIED: Primary | ICD-10-CM

## 2025-07-29 DIAGNOSIS — R31.9 URINARY TRACT INFECTION WITH HEMATURIA, SITE UNSPECIFIED: ICD-10-CM

## 2025-07-29 DIAGNOSIS — J45.40 MODERATE PERSISTENT ASTHMA WITHOUT COMPLICATION: ICD-10-CM

## 2025-07-29 DIAGNOSIS — N39.0 URINARY TRACT INFECTION WITH HEMATURIA, SITE UNSPECIFIED: ICD-10-CM

## 2025-07-29 PROCEDURE — 99214 OFFICE O/P EST MOD 30 MIN: CPT | Performed by: INTERNAL MEDICINE

## 2025-07-29 PROCEDURE — G2211 COMPLEX E/M VISIT ADD ON: HCPCS | Performed by: INTERNAL MEDICINE

## 2025-07-29 PROCEDURE — G0438 PPPS, INITIAL VISIT: HCPCS | Performed by: INTERNAL MEDICINE

## 2025-07-29 PROCEDURE — G8417 CALC BMI ABV UP PARAM F/U: HCPCS | Performed by: INTERNAL MEDICINE

## 2025-07-29 PROCEDURE — 4004F PT TOBACCO SCREEN RCVD TLK: CPT | Performed by: INTERNAL MEDICINE

## 2025-07-29 PROCEDURE — G8427 DOCREV CUR MEDS BY ELIG CLIN: HCPCS | Performed by: INTERNAL MEDICINE

## 2025-07-29 RX ORDER — VITAMIN B COMPLEX
1 TABLET ORAL DAILY
Qty: 90 TABLET | Refills: 1 | Status: SHIPPED | OUTPATIENT
Start: 2025-07-29

## 2025-07-29 ASSESSMENT — PATIENT HEALTH QUESTIONNAIRE - PHQ9
5. POOR APPETITE OR OVEREATING: NEARLY EVERY DAY
9. THOUGHTS THAT YOU WOULD BE BETTER OFF DEAD, OR OF HURTING YOURSELF: NOT AT ALL
2. FEELING DOWN, DEPRESSED OR HOPELESS: NEARLY EVERY DAY
7. TROUBLE CONCENTRATING ON THINGS, SUCH AS READING THE NEWSPAPER OR WATCHING TELEVISION: SEVERAL DAYS
10. IF YOU CHECKED OFF ANY PROBLEMS, HOW DIFFICULT HAVE THESE PROBLEMS MADE IT FOR YOU TO DO YOUR WORK, TAKE CARE OF THINGS AT HOME, OR GET ALONG WITH OTHER PEOPLE: SOMEWHAT DIFFICULT
7. TROUBLE CONCENTRATING ON THINGS, SUCH AS READING THE NEWSPAPER OR WATCHING TELEVISION: SEVERAL DAYS
SUM OF ALL RESPONSES TO PHQ QUESTIONS 1-9: 19
SUM OF ALL RESPONSES TO PHQ QUESTIONS 1-9: 17
9. THOUGHTS THAT YOU WOULD BE BETTER OFF DEAD, OR OF HURTING YOURSELF: NOT AT ALL
3. TROUBLE FALLING OR STAYING ASLEEP: MORE THAN HALF THE DAYS
4. FEELING TIRED OR HAVING LITTLE ENERGY: NEARLY EVERY DAY
8. MOVING OR SPEAKING SO SLOWLY THAT OTHER PEOPLE COULD HAVE NOTICED. OR THE OPPOSITE, BEING SO FIGETY OR RESTLESS THAT YOU HAVE BEEN MOVING AROUND A LOT MORE THAN USUAL: NOT AT ALL
2. FEELING DOWN, DEPRESSED OR HOPELESS: NEARLY EVERY DAY
10. IF YOU CHECKED OFF ANY PROBLEMS, HOW DIFFICULT HAVE THESE PROBLEMS MADE IT FOR YOU TO DO YOUR WORK, TAKE CARE OF THINGS AT HOME, OR GET ALONG WITH OTHER PEOPLE: SOMEWHAT DIFFICULT
1. LITTLE INTEREST OR PLEASURE IN DOING THINGS: NEARLY EVERY DAY
6. FEELING BAD ABOUT YOURSELF - OR THAT YOU ARE A FAILURE OR HAVE LET YOURSELF OR YOUR FAMILY DOWN: MORE THAN HALF THE DAYS
1. LITTLE INTEREST OR PLEASURE IN DOING THINGS: MORE THAN HALF THE DAYS
SUM OF ALL RESPONSES TO PHQ QUESTIONS 1-9: 17
1. LITTLE INTEREST OR PLEASURE IN DOING THINGS: NEARLY EVERY DAY
3. TROUBLE FALLING OR STAYING ASLEEP: NEARLY EVERY DAY
SUM OF ALL RESPONSES TO PHQ QUESTIONS 1-9: 17
6. FEELING BAD ABOUT YOURSELF - OR THAT YOU ARE A FAILURE OR HAVE LET YOURSELF OR YOUR FAMILY DOWN: MORE THAN HALF THE DAYS
SUM OF ALL RESPONSES TO PHQ QUESTIONS 1-9: 17
SUM OF ALL RESPONSES TO PHQ QUESTIONS 1-9: 19
5. POOR APPETITE OR OVEREATING: NEARLY EVERY DAY
9. THOUGHTS THAT YOU WOULD BE BETTER OFF DEAD, OR OF HURTING YOURSELF: NOT AT ALL
SUM OF ALL RESPONSES TO PHQ QUESTIONS 1-9: 19
7. TROUBLE CONCENTRATING ON THINGS, SUCH AS READING THE NEWSPAPER OR WATCHING TELEVISION: SEVERAL DAYS
2. FEELING DOWN, DEPRESSED OR HOPELESS: NEARLY EVERY DAY
10. IF YOU CHECKED OFF ANY PROBLEMS, HOW DIFFICULT HAVE THESE PROBLEMS MADE IT FOR YOU TO DO YOUR WORK, TAKE CARE OF THINGS AT HOME, OR GET ALONG WITH OTHER PEOPLE: SOMEWHAT DIFFICULT
SUM OF ALL RESPONSES TO PHQ QUESTIONS 1-9: 17
8. MOVING OR SPEAKING SO SLOWLY THAT OTHER PEOPLE COULD HAVE NOTICED. OR THE OPPOSITE - BEING SO FIDGETY OR RESTLESS THAT YOU HAVE BEEN MOVING AROUND A LOT MORE THAN USUAL: NOT AT ALL
8. MOVING OR SPEAKING SO SLOWLY THAT OTHER PEOPLE COULD HAVE NOTICED. OR THE OPPOSITE, BEING SO FIGETY OR RESTLESS THAT YOU HAVE BEEN MOVING AROUND A LOT MORE THAN USUAL: MORE THAN HALF THE DAYS
3. TROUBLE FALLING OR STAYING ASLEEP: MORE THAN HALF THE DAYS
4. FEELING TIRED OR HAVING LITTLE ENERGY: NEARLY EVERY DAY
6. FEELING BAD ABOUT YOURSELF - OR THAT YOU ARE A FAILURE OR HAVE LET YOURSELF OR YOUR FAMILY DOWN: NEARLY EVERY DAY
5. POOR APPETITE OR OVEREATING: MORE THAN HALF THE DAYS
4. FEELING TIRED OR HAVING LITTLE ENERGY: NEARLY EVERY DAY
SUM OF ALL RESPONSES TO PHQ QUESTIONS 1-9: 19

## 2025-07-29 ASSESSMENT — ANXIETY QUESTIONNAIRES
7. FEELING AFRAID AS IF SOMETHING AWFUL MIGHT HAPPEN: NEARLY EVERY DAY
3. WORRYING TOO MUCH ABOUT DIFFERENT THINGS: MORE THAN HALF THE DAYS
2. NOT BEING ABLE TO STOP OR CONTROL WORRYING: NEARLY EVERY DAY
IF YOU CHECKED OFF ANY PROBLEMS ON THIS QUESTIONNAIRE, HOW DIFFICULT HAVE THESE PROBLEMS MADE IT FOR YOU TO DO YOUR WORK, TAKE CARE OF THINGS AT HOME, OR GET ALONG WITH OTHER PEOPLE: SOMEWHAT DIFFICULT
GAD7 TOTAL SCORE: 15
7. FEELING AFRAID AS IF SOMETHING AWFUL MIGHT HAPPEN: NEARLY EVERY DAY
5. BEING SO RESTLESS THAT IT IS HARD TO SIT STILL: SEVERAL DAYS
4. TROUBLE RELAXING: MORE THAN HALF THE DAYS
IF YOU CHECKED OFF ANY PROBLEMS ON THIS QUESTIONNAIRE, HOW DIFFICULT HAVE THESE PROBLEMS MADE IT FOR YOU TO DO YOUR WORK, TAKE CARE OF THINGS AT HOME, OR GET ALONG WITH OTHER PEOPLE: SOMEWHAT DIFFICULT
3. WORRYING TOO MUCH ABOUT DIFFERENT THINGS: MORE THAN HALF THE DAYS
2. NOT BEING ABLE TO STOP OR CONTROL WORRYING: NEARLY EVERY DAY
6. BECOMING EASILY ANNOYED OR IRRITABLE: SEVERAL DAYS
1. FEELING NERVOUS, ANXIOUS, OR ON EDGE: NEARLY EVERY DAY
6. BECOMING EASILY ANNOYED OR IRRITABLE: SEVERAL DAYS
4. TROUBLE RELAXING: MORE THAN HALF THE DAYS
1. FEELING NERVOUS, ANXIOUS, OR ON EDGE: NEARLY EVERY DAY
5. BEING SO RESTLESS THAT IT IS HARD TO SIT STILL: SEVERAL DAYS

## 2025-07-29 ASSESSMENT — ENCOUNTER SYMPTOMS
WHEEZING: 0
SHORTNESS OF BREATH: 0
VOICE CHANGE: 0
PHOTOPHOBIA: 0
TROUBLE SWALLOWING: 0
NAUSEA: 0
VOMITING: 0

## 2025-07-29 ASSESSMENT — LIFESTYLE VARIABLES
HOW MANY STANDARD DRINKS CONTAINING ALCOHOL DO YOU HAVE ON A TYPICAL DAY: PATIENT DOES NOT DRINK
HOW OFTEN DO YOU HAVE A DRINK CONTAINING ALCOHOL: NEVER

## 2025-07-29 NOTE — PROGRESS NOTES
Brigette Betancourt  1985  female  40 y.o.    SUBJECTIVE:    Chief Complaint   Patient presents with    3 Month Follow-Up     ED follow up seen on 25 at [facility]   Pain in stomach and back had a kidney issues   Wants a referral to Dentist or oral surgeon       History of Present Illness  The patient presents for evaluation of a urinary tract infection, misplaced IUD, asthma, dry mouth, and dental caries.    She recently visited the hospital due to an infection with fever, chills, and confusion. This was the first time she experienced confusion. Symptoms have subsided, but she continues to experience kidney pain. No current fever or chills. On antibiotics for 14 days and has abstained from food for potential blood work.    She finds her IUD uncomfortable and painful, experiencing vaginal bleeding attributed to the IUD, but no hematuria.  Patient already has established gynecologist.    Using Wixela and albuterol for asthma, which is well-controlled indoors. Experienced additional wheezing recently.    Reports a large cavity and seeks a referral to a dentist. Has dental insurance through Medicare, but it does not cover treatment.  Patient reports she tried at least 35 dentists without any luck.  Previous dentist closed practice two years ago.      Due for a mammogram and has scheduled an appointment.         Past Medical History:   Diagnosis Date    Anxiety nemo    not sure    Asthma     Depression     Hidradenitis suppurativa     HTN (hypertension)     Obesity nemo    as a kid    Restless legs syndrome nemo    before dr marie    Thyroid disease      Past Surgical History:   Procedure Laterality Date     SECTION      OTHER SURGICAL HISTORY Right 2018    EXCISION OF SEBACEOUS CYST OF BACK UPPER NECK    PAIN MANAGEMENT PROCEDURE Right 2021    RIGHT L3 TRANSFORAMINAL EPIDURAL STEROID INJECTION WITH FLUOROSCOPY performed by Cem Gore MD at [facility]    PAIN MANAGEMENT PROCEDURE Right 9/15/2022

## 2025-07-29 NOTE — CARE COORDINATION
Ambulatory Care Coordination Note     2025 2:36 PM     Patient Current Location:  Home: [facility] [address]  [place] [address]     This patient was received as a referral from Provider.    ACM contacted the patient by telephone. Verified name and  with patient as identifiers. Provided introduction to self, and explanation of the ACM role.   Patient accepted care management services at this time.          ACM: Heather White RN     Challenges to be reviewed by the provider   Additional needs identified to be addressed with provider No                 Method of communication with provider: none.    Utilization: Patient has not had any utilization since our last call.     Care Summary Note: pcp referral to care coordination for dental resources. Patient is need of a dental filling and is looking for an affordable dental clinic. Patient states she has contacted numerous clinics but they do not accept her insurance.   Contact information for Geraldine Mercado and [facility] sent via [employer]. Patient states she called  Dental Clinic but they only accept referrals from [facility]. Patient aware ACM will refer to [facility] for additional resources. No other immediate concerns voiced.     Offered patient enrollment in the Remote Patient Monitoring (RPM) program for in-home monitoring: Patient is not eligible for RPM program because: patient does not have qualifying diagnosis.     Assessments Completed:   General Assessment    Do you have any symptoms that are causing concern?: No          Medications Reviewed:   Not completed during this call:      Advance Care Planning:   Not reviewed during this call     Care Planning:    Goals Addressed                      This Visit's Progress      Patient Stated (pt-stated)         Dental Resources    Barriers: financial  Plan for overcoming my barriers: refer to [facility]   Confidence: 5/10  Anticipated Goal Completion Date: 25 information given for Geraldine Mercado and

## 2025-07-29 NOTE — PROGRESS NOTES
Consulting Physician (Physical Medicine and Rehab)     Recommendations for Preventive Services Due: see orders and patient instructions/AVS.  Recommended screening schedule for the next 5-10 years is provided to the patient in written form: see Patient Instructions/AVS.     Reviewed and updated this visit:  Allergies  Meds  Med Lauren Villanueva LPN, 7/29/2025, performed the documented evaluation under the direct supervision of the attending physician.   This encounter was performed under SANCHO caldwell MD’s, direct supervision, 7/29/2025.

## 2025-07-30 ENCOUNTER — OFFICE VISIT (OUTPATIENT)
Dept: PSYCHIATRY | Age: 40
End: 2025-07-30
Payer: MEDICARE

## 2025-07-30 VITALS
SYSTOLIC BLOOD PRESSURE: 102 MMHG | HEART RATE: 73 BPM | WEIGHT: 220.9 LBS | HEIGHT: 59 IN | BODY MASS INDEX: 44.53 KG/M2 | DIASTOLIC BLOOD PRESSURE: 70 MMHG

## 2025-07-30 DIAGNOSIS — F10.21 ALCOHOL USE DISORDER, SEVERE, IN SUSTAINED REMISSION (HCC): ICD-10-CM

## 2025-07-30 DIAGNOSIS — F33.1 MODERATE EPISODE OF RECURRENT MAJOR DEPRESSIVE DISORDER (HCC): Primary | ICD-10-CM

## 2025-07-30 DIAGNOSIS — F43.9 TRAUMA AND STRESSOR-RELATED DISORDER: ICD-10-CM

## 2025-07-30 LAB
BASOPHILS # BLD: 0.1 K/UL (ref 0–0.2)
BASOPHILS NFR BLD: 1.3 %
DEPRECATED RDW RBC AUTO: 14.7 % (ref 12.4–15.4)
EOSINOPHIL # BLD: 0.4 K/UL (ref 0–0.6)
EOSINOPHIL NFR BLD: 4 %
HCT VFR BLD AUTO: 42.9 % (ref 36–48)
HGB BLD-MCNC: 14.9 G/DL (ref 12–16)
LYMPHOCYTES # BLD: 2.8 K/UL (ref 1–5.1)
LYMPHOCYTES NFR BLD: 31.3 %
MCH RBC QN AUTO: 32.6 PG (ref 26–34)
MCHC RBC AUTO-ENTMCNC: 34.6 G/DL (ref 31–36)
MCV RBC AUTO: 94.1 FL (ref 80–100)
MONOCYTES # BLD: 0.8 K/UL (ref 0–1.3)
MONOCYTES NFR BLD: 8.9 %
NEUTROPHILS # BLD: 4.8 K/UL (ref 1.7–7.7)
NEUTROPHILS NFR BLD: 54.5 %
PLATELET # BLD AUTO: 189 K/UL (ref 135–450)
PMV BLD AUTO: 13.9 FL (ref 5–10.5)
RBC # BLD AUTO: 4.56 M/UL (ref 4–5.2)
WBC # BLD AUTO: 8.8 K/UL (ref 4–11)

## 2025-07-30 PROCEDURE — G8427 DOCREV CUR MEDS BY ELIG CLIN: HCPCS | Performed by: PSYCHIATRY & NEUROLOGY

## 2025-07-30 PROCEDURE — 4004F PT TOBACCO SCREEN RCVD TLK: CPT | Performed by: PSYCHIATRY & NEUROLOGY

## 2025-07-30 PROCEDURE — 99214 OFFICE O/P EST MOD 30 MIN: CPT | Performed by: PSYCHIATRY & NEUROLOGY

## 2025-07-30 PROCEDURE — G8417 CALC BMI ABV UP PARAM F/U: HCPCS | Performed by: PSYCHIATRY & NEUROLOGY

## 2025-07-30 RX ORDER — BUSPIRONE HYDROCHLORIDE 30 MG/1
30 TABLET ORAL 2 TIMES DAILY
Qty: 180 TABLET | Refills: 2 | Status: SHIPPED | OUTPATIENT
Start: 2025-07-30

## 2025-07-30 NOTE — PROGRESS NOTES
PSYCHIATRY PROGRESS NOTE    Brigette Betancourt : 25  PCP: SANCHO Ramos MD    Chief Complaint   Patient presents with    Depression     S:   Pt has been dealing with several medical issues causing increased depression and stress. She was recently hospitalized for acute pyelonephritis. She last lost 20-30 lbs over the last months which she attributes to a new injection medication for her HS she is taking that has suppressed her appetite. SHe is unsure of the name of the medication.   She was started on wellbutrin earlier this month for smoking cessation and this has helped a lot, she has reduced to one pack per week.  She last used MJ 2 weeks ago, has reduced use considerably.   She is still following with her therapist and has been discussing past trauma.   She is not taking the doxepin for sleep.  She continues to not have good control of her depression on current treatment, but also recognizes other recent stressors related to her health have been contributing to some worsening of her mood.     ROS: +wounds d/t HS, chronic.    Current Psychiatric Medications:  Fluoxetine 80mg daily  Doxepin 10mg qhs (oral liquid) - not taking  Hydroxyzine 25mg TID prn itching / anxiety  Buspar 30mg BID    O:  Vitals:    25 0913   BP: 102/70   Pulse: 73        Wt Readings from Last 3 Encounters:   25 100.2 kg (220 lb 14.4 oz)   25 100.2 kg (221 lb)   25 100.2 kg (221 lb)         2025    11:07 AM 2025    10:05 AM 2025    11:45 PM 2025     8:20 AM 4/10/2025    12:13 PM 2025    10:02 AM 10/11/2024    12:45 PM   PHQ Scores   PHQ2 Score 6  5 5  4  4 5 6   PHQ9 Score 17  19 18  17  15 15 25       Patient-reported         2025    11:06 AM 2025    11:44 PM 2025     8:19 AM 4/10/2025    12:00 PM 2025    10:00 AM 10/11/2024    12:00 PM   MARCY 7 SCORE   MARCY-7 Total Score 15  15  18  15 13 16       Patient-reported     Mental Status Exam:   Appearance   casually

## 2025-07-31 ENCOUNTER — CARE COORDINATION (OUTPATIENT)
Dept: CARE COORDINATION | Age: 40
End: 2025-07-31

## 2025-07-31 DIAGNOSIS — R30.0 DYSURIA: Primary | ICD-10-CM

## 2025-07-31 RX ORDER — PHENAZOPYRIDINE HYDROCHLORIDE 200 MG/1
200 TABLET, FILM COATED ORAL 3 TIMES DAILY PRN
Qty: 6 TABLET | Refills: 0 | Status: SHIPPED | OUTPATIENT
Start: 2025-07-31 | End: 2025-08-02

## 2025-07-31 NOTE — CARE COORDINATION
Call to pt to initiate SW referral for Medicaid dental providers. Pt unsure if she has traditional Medicaid or [employer]. [facility] has [facility] coverage and if so accessing their find a provider site. SW provided site information for Department of Medicaid find a provider webpage as well.

## 2025-08-04 ENCOUNTER — OFFICE VISIT (OUTPATIENT)
Dept: PULMONOLOGY | Age: 40
End: 2025-08-04
Payer: MEDICARE

## 2025-08-04 VITALS
WEIGHT: 223 LBS | BODY MASS INDEX: 44.96 KG/M2 | DIASTOLIC BLOOD PRESSURE: 70 MMHG | SYSTOLIC BLOOD PRESSURE: 110 MMHG | HEART RATE: 70 BPM | OXYGEN SATURATION: 98 % | HEIGHT: 59 IN

## 2025-08-04 DIAGNOSIS — E66.01 MORBID OBESITY WITH BMI OF 45.0-49.9, ADULT (HCC): ICD-10-CM

## 2025-08-04 DIAGNOSIS — F17.210 CIGARETTE NICOTINE DEPENDENCE WITHOUT COMPLICATION: ICD-10-CM

## 2025-08-04 DIAGNOSIS — G47.33 OSA (OBSTRUCTIVE SLEEP APNEA): ICD-10-CM

## 2025-08-04 DIAGNOSIS — J45.30 MILD PERSISTENT ASTHMA WITHOUT COMPLICATION: Primary | ICD-10-CM

## 2025-08-04 PROCEDURE — 99214 OFFICE O/P EST MOD 30 MIN: CPT | Performed by: INTERNAL MEDICINE

## 2025-08-04 PROCEDURE — G2211 COMPLEX E/M VISIT ADD ON: HCPCS | Performed by: INTERNAL MEDICINE

## 2025-08-04 PROCEDURE — 4004F PT TOBACCO SCREEN RCVD TLK: CPT | Performed by: INTERNAL MEDICINE

## 2025-08-04 PROCEDURE — G8417 CALC BMI ABV UP PARAM F/U: HCPCS | Performed by: INTERNAL MEDICINE

## 2025-08-04 PROCEDURE — G8427 DOCREV CUR MEDS BY ELIG CLIN: HCPCS | Performed by: INTERNAL MEDICINE

## 2025-08-04 RX ORDER — FLUTICASONE FUROATE, UMECLIDINIUM BROMIDE AND VILANTEROL TRIFENATATE 200; 62.5; 25 UG/1; UG/1; UG/1
1 POWDER RESPIRATORY (INHALATION) DAILY
Qty: 60 EACH | Refills: 3 | Status: SHIPPED | OUTPATIENT
Start: 2025-08-04

## 2025-08-04 RX ORDER — LORATADINE 10 MG/1
10 TABLET ORAL DAILY
Qty: 30 TABLET | Refills: 3 | Status: SHIPPED | OUTPATIENT
Start: 2025-08-04

## 2025-08-06 ENCOUNTER — TELEPHONE (OUTPATIENT)
Dept: PSYCHIATRY | Age: 40
End: 2025-08-06

## 2025-08-06 ENCOUNTER — HOSPITAL ENCOUNTER (OUTPATIENT)
Dept: PULMONOLOGY | Age: 40
Discharge: HOME | End: 2025-08-06
Attending: INTERNAL MEDICINE
Payer: MEDICARE

## 2025-08-06 DIAGNOSIS — J45.30 MILD PERSISTENT ASTHMA WITHOUT COMPLICATION: ICD-10-CM

## 2025-08-06 LAB
DLCO %PRED: 110 %
DLCO PRED: NORMAL
DLCO/VA %PRED: NORMAL
DLCO/VA PRED: NORMAL
DLCO/VA: NORMAL
DLCO: NORMAL
EXPIRATORY TIME-POST: NORMAL
EXPIRATORY TIME: NORMAL
FEF 25-75 %CHNG: NORMAL
FEF 25-75 POST %PRED: NORMAL
FEF 25-75% %PRED-PRE: NORMAL
FEF 25-75% PRED: NORMAL
FEF 25-75-POST: NORMAL
FEF 25-75-PRE: NORMAL
FEV1 %PRED-POST: NORMAL
FEV1 %PRED-PRE: 112 %
FEV1 PRED: NORMAL
FEV1-POST: NORMAL
FEV1-PRE: NORMAL
FEV1/FVC %PRED-POST: NORMAL
FEV1/FVC %PRED-PRE: NORMAL
FEV1/FVC PRED: NORMAL
FEV1/FVC-POST: NORMAL
FEV1/FVC-PRE: 69 %
FVC %PRED-POST: NORMAL
FVC %PRED-PRE: NORMAL
FVC PRED: NORMAL
FVC-POST: NORMAL
FVC-PRE: NORMAL
GAW %PRED: NORMAL
GAW PRED: NORMAL
GAW: NORMAL
IC PRE %PRED: NORMAL
IC PRED: NORMAL
IC: NORMAL
MEP: NORMAL
MIP: NORMAL
MVV %PRED-PRE: NORMAL
MVV PRED: NORMAL
MVV-PRE: NORMAL
PEF %PRED-POST: NORMAL
PEF %PRED-PRE: NORMAL
PEF PRED: NORMAL
PEF%CHNG: NORMAL
PEF-POST: NORMAL
PEF-PRE: NORMAL
RAW %PRED: NORMAL
RAW PRED: NORMAL
RAW: NORMAL
RV PRE %PRED: NORMAL
RV PRED: NORMAL
RV: NORMAL
SVC %PRED: NORMAL
SVC PRED: NORMAL
SVC: NORMAL
TLC PRE %PRED: 134 %
TLC PRED: NORMAL
TLC: NORMAL
VA %PRED: NORMAL
VA PRED: NORMAL
VA: NORMAL
VTG %PRED: NORMAL
VTG PRED: NORMAL
VTG: NORMAL

## 2025-08-06 PROCEDURE — 94760 N-INVAS EAR/PLS OXIMETRY 1: CPT

## 2025-08-06 PROCEDURE — 94726 PLETHYSMOGRAPHY LUNG VOLUMES: CPT

## 2025-08-06 PROCEDURE — 94010 BREATHING CAPACITY TEST: CPT

## 2025-08-06 PROCEDURE — 94729 DIFFUSING CAPACITY: CPT

## 2025-08-06 ASSESSMENT — PULMONARY FUNCTION TESTS
FEV1/FVC_PRE: 69
FEV1_PERCENT_PREDICTED_PRE: 112

## 2025-08-07 PROBLEM — J45.30 MILD PERSISTENT ASTHMA WITHOUT COMPLICATION: Status: ACTIVE | Noted: 2025-08-07

## 2025-08-08 DIAGNOSIS — E78.5 HYPERLIPIDEMIA, UNSPECIFIED HYPERLIPIDEMIA TYPE: ICD-10-CM

## 2025-08-11 ENCOUNTER — TELEPHONE (OUTPATIENT)
Dept: INTERNAL MEDICINE CLINIC | Age: 40
End: 2025-08-11

## 2025-08-11 RX ORDER — ATORVASTATIN CALCIUM 20 MG/1
20 TABLET, FILM COATED ORAL DAILY
Qty: 30 TABLET | Refills: 11 | Status: SHIPPED | OUTPATIENT
Start: 2025-08-11

## 2025-08-13 ENCOUNTER — OFFICE VISIT (OUTPATIENT)
Dept: INTERNAL MEDICINE CLINIC | Age: 40
End: 2025-08-13
Payer: MEDICARE

## 2025-08-13 VITALS
HEART RATE: 73 BPM | BODY MASS INDEX: 43.71 KG/M2 | WEIGHT: 216.8 LBS | SYSTOLIC BLOOD PRESSURE: 112 MMHG | DIASTOLIC BLOOD PRESSURE: 76 MMHG | HEIGHT: 59 IN | OXYGEN SATURATION: 98 %

## 2025-08-13 DIAGNOSIS — J04.0 LARYNGITIS: Primary | ICD-10-CM

## 2025-08-13 DIAGNOSIS — B37.0 ORAL YEAST INFECTION: ICD-10-CM

## 2025-08-13 PROCEDURE — 99213 OFFICE O/P EST LOW 20 MIN: CPT | Performed by: INTERNAL MEDICINE

## 2025-08-13 PROCEDURE — G8427 DOCREV CUR MEDS BY ELIG CLIN: HCPCS | Performed by: INTERNAL MEDICINE

## 2025-08-13 PROCEDURE — G8417 CALC BMI ABV UP PARAM F/U: HCPCS | Performed by: INTERNAL MEDICINE

## 2025-08-13 PROCEDURE — 4004F PT TOBACCO SCREEN RCVD TLK: CPT | Performed by: INTERNAL MEDICINE

## 2025-08-13 RX ORDER — PREDNISONE 20 MG/1
20 TABLET ORAL 2 TIMES DAILY
Qty: 14 TABLET | Refills: 0 | Status: SHIPPED | OUTPATIENT
Start: 2025-08-13 | End: 2025-08-13

## 2025-08-13 RX ORDER — AZITHROMYCIN 250 MG/1
TABLET, FILM COATED ORAL
Qty: 6 TABLET | Refills: 0 | Status: SHIPPED | OUTPATIENT
Start: 2025-08-13 | End: 2025-08-13

## 2025-08-13 RX ORDER — FLUCONAZOLE 100 MG/1
100 TABLET ORAL DAILY
Qty: 7 TABLET | Refills: 0 | Status: SHIPPED | OUTPATIENT
Start: 2025-08-13 | End: 2025-08-20

## 2025-08-13 RX ORDER — FLUCONAZOLE 100 MG/1
100 TABLET ORAL DAILY
Qty: 7 TABLET | Refills: 0 | Status: SHIPPED | OUTPATIENT
Start: 2025-08-13 | End: 2025-08-13

## 2025-08-13 RX ORDER — PREDNISONE 20 MG/1
20 TABLET ORAL 2 TIMES DAILY
Qty: 14 TABLET | Refills: 0 | Status: SHIPPED | OUTPATIENT
Start: 2025-08-13 | End: 2025-08-20

## 2025-08-13 RX ORDER — AZITHROMYCIN 250 MG/1
TABLET, FILM COATED ORAL
Qty: 6 TABLET | Refills: 0 | Status: SHIPPED | OUTPATIENT
Start: 2025-08-13 | End: 2025-08-23

## 2025-08-13 ASSESSMENT — ENCOUNTER SYMPTOMS
VOICE CHANGE: 1
SHORTNESS OF BREATH: 0
TROUBLE SWALLOWING: 0
NAUSEA: 0
ABDOMINAL PAIN: 0
WHEEZING: 0
VOMITING: 0

## 2025-08-14 ENCOUNTER — CARE COORDINATION (OUTPATIENT)
Dept: CARE COORDINATION | Age: 40
End: 2025-08-14

## 2025-08-15 ENCOUNTER — CARE COORDINATION (OUTPATIENT)
Dept: CARE COORDINATION | Age: 40
End: 2025-08-15

## 2025-08-18 ENCOUNTER — CARE COORDINATION (OUTPATIENT)
Dept: CARE COORDINATION | Age: 40
End: 2025-08-18

## 2025-08-27 ENCOUNTER — CARE COORDINATION (OUTPATIENT)
Dept: CARE COORDINATION | Age: 40
End: 2025-08-27

## (undated) DEVICE — 3 ML SYRINGE LUER-LOCK TIP: Brand: MONOJECT

## (undated) DEVICE — STERILE POLYISOPRENE POWDER-FREE SURGICAL GLOVES: Brand: PROTEXIS

## (undated) DEVICE — SYRINGE MED 3ML CLR PLAS LUERLOCK CONN VI ACCS INTLNK 15GA

## (undated) DEVICE — NEEDLE SPNL 22GA L5IN BLK HUB S STL W/ QNCKE PNT W/OUT

## (undated) DEVICE — AVANOS* EXTENSION SETS: Brand: EXTENSION SET, MINI BORE, 12" LENGTH, 0.50ML VOLUME 25

## (undated) DEVICE — NEEDLE SPNL 22GA L3.5IN BLK HUB S STL REG WALL FIT STYL W/

## (undated) DEVICE — GLOVE ORANGE PI 7 1/2   MSG9075

## (undated) DEVICE — TRAY ANES EPI 5CC GLS LL 18GA CUST

## (undated) DEVICE — GLOVE ORANGE PI 8   MSG9080

## (undated) DEVICE — NEEDLE SPNL 22GA L3.5IN BLK HUB S STL REG WALL FIT STYL

## (undated) DEVICE — STANDARD HYPODERMIC NEEDLE,POLYPROPYLENE HUB: Brand: MONOJECT

## (undated) DEVICE — PORT VLV 2 W NDL FREE SMRTSITE

## (undated) DEVICE — SYRINGE MED 10ML LUERLOCK TIP W/O SFTY DISP

## (undated) DEVICE — 6 ML SYRINGE LUER-LOCK TIP: Brand: MONOJECT

## (undated) DEVICE — MEDIA CONTRAST RX ISOVUE-300 61% 30ML VIALS

## (undated) DEVICE — 7496-8Z MINI-PLUS KIT: Brand: DEVON

## (undated) DEVICE — Device: Brand: JELCO

## (undated) DEVICE — TRAY ANES SUPP NO DRUG CUST

## (undated) DEVICE — CHLORAPREP 26ML ORANGE

## (undated) DEVICE — PEN: MARKING STD 100/CS: Brand: MEDICAL ACTION INDUSTRIES